# Patient Record
Sex: MALE | Race: WHITE | NOT HISPANIC OR LATINO | Employment: PART TIME | URBAN - NONMETROPOLITAN AREA
[De-identification: names, ages, dates, MRNs, and addresses within clinical notes are randomized per-mention and may not be internally consistent; named-entity substitution may affect disease eponyms.]

---

## 2023-05-15 DIAGNOSIS — I10 ESSENTIAL (PRIMARY) HYPERTENSION: ICD-10-CM

## 2023-05-15 DIAGNOSIS — E11.9 TYPE 2 DIABETES MELLITUS WITHOUT COMPLICATIONS (MULTI): ICD-10-CM

## 2023-05-15 RX ORDER — SPIRONOLACTONE 25 MG/1
TABLET ORAL
Qty: 30 TABLET | Refills: 0 | Status: SHIPPED | OUTPATIENT
Start: 2023-05-15 | End: 2023-06-13

## 2023-05-15 RX ORDER — METFORMIN HYDROCHLORIDE 750 MG/1
TABLET, EXTENDED RELEASE ORAL
Qty: 60 TABLET | Refills: 0 | Status: SHIPPED | OUTPATIENT
Start: 2023-05-15 | End: 2023-06-13

## 2023-05-15 RX ORDER — ATORVASTATIN CALCIUM 80 MG/1
TABLET, FILM COATED ORAL
Qty: 30 TABLET | Refills: 0 | Status: SHIPPED | OUTPATIENT
Start: 2023-05-15 | End: 2023-06-13

## 2023-05-24 DIAGNOSIS — R33.9 RETENTION OF URINE, UNSPECIFIED: ICD-10-CM

## 2023-05-24 RX ORDER — TAMSULOSIN HYDROCHLORIDE 0.4 MG/1
CAPSULE ORAL
Qty: 90 CAPSULE | Refills: 0 | Status: SHIPPED | OUTPATIENT
Start: 2023-05-24 | End: 2023-06-13 | Stop reason: ALTCHOICE

## 2023-05-26 DIAGNOSIS — I10 ESSENTIAL (PRIMARY) HYPERTENSION: ICD-10-CM

## 2023-05-26 RX ORDER — LOSARTAN POTASSIUM 25 MG/1
TABLET ORAL
Qty: 30 TABLET | Refills: 0 | Status: SHIPPED | OUTPATIENT
Start: 2023-05-26 | End: 2023-06-13 | Stop reason: SDUPTHER

## 2023-06-13 ENCOUNTER — OFFICE VISIT (OUTPATIENT)
Dept: PRIMARY CARE | Facility: CLINIC | Age: 78
End: 2023-06-13
Payer: MEDICARE

## 2023-06-13 VITALS
WEIGHT: 203.6 LBS | DIASTOLIC BLOOD PRESSURE: 76 MMHG | HEIGHT: 70 IN | SYSTOLIC BLOOD PRESSURE: 124 MMHG | HEART RATE: 52 BPM | OXYGEN SATURATION: 97 % | BODY MASS INDEX: 29.15 KG/M2

## 2023-06-13 DIAGNOSIS — I10 ESSENTIAL (PRIMARY) HYPERTENSION: ICD-10-CM

## 2023-06-13 DIAGNOSIS — E11.9 TYPE 2 DIABETES MELLITUS WITHOUT COMPLICATIONS (MULTI): ICD-10-CM

## 2023-06-13 DIAGNOSIS — E78.2 MIXED HYPERLIPIDEMIA: ICD-10-CM

## 2023-06-13 DIAGNOSIS — E11.9 CONTROLLED TYPE 2 DIABETES MELLITUS WITHOUT COMPLICATION, WITHOUT LONG-TERM CURRENT USE OF INSULIN (MULTI): Primary | ICD-10-CM

## 2023-06-13 DIAGNOSIS — J30.0 VASOMOTOR RHINITIS: ICD-10-CM

## 2023-06-13 DIAGNOSIS — R93.1 AGATSTON CORONARY ARTERY CALCIUM SCORE GREATER THAN 400: ICD-10-CM

## 2023-06-13 PROBLEM — R33.9 URINARY RETENTION: Status: ACTIVE | Noted: 2023-06-13

## 2023-06-13 PROBLEM — E04.2 NONTOXIC MULTINODULAR GOITER: Status: RESOLVED | Noted: 2023-06-13 | Resolved: 2023-06-13

## 2023-06-13 PROBLEM — M17.10 ARTHRITIS OF KNEE: Status: ACTIVE | Noted: 2023-06-13

## 2023-06-13 PROBLEM — E04.0 GOITER DIFFUSE: Status: RESOLVED | Noted: 2023-06-13 | Resolved: 2023-06-13

## 2023-06-13 PROBLEM — M17.12 PRIMARY OSTEOARTHRITIS OF LEFT KNEE: Status: ACTIVE | Noted: 2023-06-13

## 2023-06-13 PROBLEM — R39.11 URINARY HESITANCY: Status: ACTIVE | Noted: 2023-06-13

## 2023-06-13 PROBLEM — E04.0 GOITER DIFFUSE: Status: ACTIVE | Noted: 2023-06-13

## 2023-06-13 PROBLEM — R33.9 URINARY RETENTION: Status: RESOLVED | Noted: 2023-06-13 | Resolved: 2023-06-13

## 2023-06-13 PROBLEM — Z96.651 STATUS POST RIGHT KNEE REPLACEMENT: Status: RESOLVED | Noted: 2023-06-13 | Resolved: 2023-06-13

## 2023-06-13 LAB
ALBUMIN (MG/L) IN URINE: 444.5 MG/L
ALBUMIN/CREATININE (UG/MG) IN URINE: 468.9 UG/MG CRT (ref 0–30)
ANION GAP IN SER/PLAS: 13 MMOL/L (ref 10–20)
CALCIUM (MG/DL) IN SER/PLAS: 9.2 MG/DL (ref 8.6–10.3)
CARBON DIOXIDE, TOTAL (MMOL/L) IN SER/PLAS: 25 MMOL/L (ref 21–32)
CHLORIDE (MMOL/L) IN SER/PLAS: 100 MMOL/L (ref 98–107)
CHOLESTEROL (MG/DL) IN SER/PLAS: 127 MG/DL (ref 0–199)
CHOLESTEROL IN HDL (MG/DL) IN SER/PLAS: 31.8 MG/DL
CHOLESTEROL/HDL RATIO: 4
CREATININE (MG/DL) IN SER/PLAS: 0.9 MG/DL (ref 0.5–1.3)
CREATININE (MG/DL) IN URINE: 94.8 MG/DL (ref 20–370)
GFR MALE: 87 ML/MIN/1.73M2
GLUCOSE (MG/DL) IN SER/PLAS: 223 MG/DL (ref 74–99)
LDL: 61 MG/DL (ref 0–99)
POC HEMOGLOBIN A1C: 8.3 % (ref 4.2–6.5)
POTASSIUM (MMOL/L) IN SER/PLAS: 4.7 MMOL/L (ref 3.5–5.3)
SODIUM (MMOL/L) IN SER/PLAS: 133 MMOL/L (ref 136–145)
TRIGLYCERIDE (MG/DL) IN SER/PLAS: 171 MG/DL (ref 0–149)
UREA NITROGEN (MG/DL) IN SER/PLAS: 19 MG/DL (ref 6–23)
VLDL: 34 MG/DL (ref 0–40)

## 2023-06-13 PROCEDURE — 3074F SYST BP LT 130 MM HG: CPT | Performed by: FAMILY MEDICINE

## 2023-06-13 PROCEDURE — G0439 PPPS, SUBSEQ VISIT: HCPCS | Performed by: FAMILY MEDICINE

## 2023-06-13 PROCEDURE — 82043 UR ALBUMIN QUANTITATIVE: CPT

## 2023-06-13 PROCEDURE — 1160F RVW MEDS BY RX/DR IN RCRD: CPT | Performed by: FAMILY MEDICINE

## 2023-06-13 PROCEDURE — 1170F FXNL STATUS ASSESSED: CPT | Performed by: FAMILY MEDICINE

## 2023-06-13 PROCEDURE — 83036 HEMOGLOBIN GLYCOSYLATED A1C: CPT | Performed by: FAMILY MEDICINE

## 2023-06-13 PROCEDURE — 3078F DIAST BP <80 MM HG: CPT | Performed by: FAMILY MEDICINE

## 2023-06-13 PROCEDURE — 80048 BASIC METABOLIC PNL TOTAL CA: CPT

## 2023-06-13 PROCEDURE — 99397 PER PM REEVAL EST PAT 65+ YR: CPT | Performed by: FAMILY MEDICINE

## 2023-06-13 PROCEDURE — 1157F ADVNC CARE PLAN IN RCRD: CPT | Performed by: FAMILY MEDICINE

## 2023-06-13 PROCEDURE — 82570 ASSAY OF URINE CREATININE: CPT

## 2023-06-13 PROCEDURE — 1159F MED LIST DOCD IN RCRD: CPT | Performed by: FAMILY MEDICINE

## 2023-06-13 PROCEDURE — 99214 OFFICE O/P EST MOD 30 MIN: CPT | Performed by: FAMILY MEDICINE

## 2023-06-13 PROCEDURE — 1036F TOBACCO NON-USER: CPT | Performed by: FAMILY MEDICINE

## 2023-06-13 PROCEDURE — 80061 LIPID PANEL: CPT

## 2023-06-13 RX ORDER — ATORVASTATIN CALCIUM 80 MG/1
TABLET, FILM COATED ORAL
Qty: 30 TABLET | Refills: 0 | Status: SHIPPED | OUTPATIENT
Start: 2023-06-13 | End: 2023-06-13 | Stop reason: SDUPTHER

## 2023-06-13 RX ORDER — HYDROCHLOROTHIAZIDE 25 MG/1
25 TABLET ORAL DAILY
Qty: 90 TABLET | Refills: 1 | Status: SHIPPED | OUTPATIENT
Start: 2023-06-13 | End: 2023-12-19 | Stop reason: SDUPTHER

## 2023-06-13 RX ORDER — FINASTERIDE 5 MG/1
5 TABLET, FILM COATED ORAL DAILY
COMMUNITY
Start: 2023-01-24 | End: 2023-06-13 | Stop reason: ALTCHOICE

## 2023-06-13 RX ORDER — HYDROCHLOROTHIAZIDE 25 MG/1
TABLET ORAL
COMMUNITY
Start: 2015-08-10 | End: 2023-06-13 | Stop reason: SDUPTHER

## 2023-06-13 RX ORDER — METOPROLOL TARTRATE 100 MG/1
TABLET ORAL
COMMUNITY
Start: 2013-02-26 | End: 2023-06-13 | Stop reason: ALTCHOICE

## 2023-06-13 RX ORDER — VIT C/E/ZN/COPPR/LUTEIN/ZEAXAN 250MG-90MG
CAPSULE ORAL
COMMUNITY
Start: 2020-01-21

## 2023-06-13 RX ORDER — SPIRONOLACTONE 25 MG/1
25 TABLET ORAL DAILY
Qty: 90 TABLET | Refills: 1 | Status: SHIPPED | OUTPATIENT
Start: 2023-06-13 | End: 2023-12-05

## 2023-06-13 RX ORDER — AZELASTINE 1 MG/ML
2 SPRAY, METERED NASAL 2 TIMES DAILY
Qty: 30 ML | Refills: 5 | Status: SHIPPED | OUTPATIENT
Start: 2023-06-13 | End: 2024-06-12

## 2023-06-13 RX ORDER — BLOOD-GLUCOSE METER
EACH MISCELLANEOUS
COMMUNITY
Start: 2022-05-16 | End: 2023-11-02

## 2023-06-13 RX ORDER — LOSARTAN POTASSIUM 25 MG/1
25 TABLET ORAL DAILY
Qty: 90 TABLET | Refills: 1 | Status: SHIPPED | OUTPATIENT
Start: 2023-06-13 | End: 2023-12-19 | Stop reason: SDUPTHER

## 2023-06-13 RX ORDER — ASPIRIN 81 MG/1
1 TABLET ORAL DAILY
COMMUNITY
Start: 2020-03-20

## 2023-06-13 RX ORDER — SPIRONOLACTONE 25 MG/1
TABLET ORAL
Qty: 30 TABLET | Refills: 0 | Status: SHIPPED | OUTPATIENT
Start: 2023-06-13 | End: 2023-06-13 | Stop reason: SDUPTHER

## 2023-06-13 RX ORDER — METFORMIN HYDROCHLORIDE 750 MG/1
TABLET, EXTENDED RELEASE ORAL
Qty: 60 TABLET | Refills: 0 | Status: SHIPPED | OUTPATIENT
Start: 2023-06-13 | End: 2023-06-13 | Stop reason: SDUPTHER

## 2023-06-13 RX ORDER — METFORMIN HYDROCHLORIDE 750 MG/1
1500 TABLET, EXTENDED RELEASE ORAL
Qty: 180 TABLET | Refills: 1 | Status: SHIPPED | OUTPATIENT
Start: 2023-06-13 | End: 2023-12-05

## 2023-06-13 RX ORDER — METOPROLOL SUCCINATE 100 MG/1
100 TABLET, EXTENDED RELEASE ORAL DAILY
Qty: 90 TABLET | Refills: 1 | Status: SHIPPED | OUTPATIENT
Start: 2023-06-13 | End: 2023-12-05

## 2023-06-13 RX ORDER — ATORVASTATIN CALCIUM 80 MG/1
80 TABLET, FILM COATED ORAL EVERY EVENING
Qty: 90 TABLET | Refills: 1 | Status: SHIPPED | OUTPATIENT
Start: 2023-06-13 | End: 2023-12-05

## 2023-06-13 RX ORDER — AZELASTINE 1 MG/ML
SPRAY, METERED NASAL
COMMUNITY
Start: 2013-02-26 | End: 2023-06-13 | Stop reason: SDUPTHER

## 2023-06-13 ASSESSMENT — ENCOUNTER SYMPTOMS
APPETITE CHANGE: 0
PALPITATIONS: 0
COUGH: 0
SINUS PRESSURE: 0
CONSTIPATION: 0
ABDOMINAL DISTENTION: 0
CHEST TIGHTNESS: 0
SHORTNESS OF BREATH: 0
ACTIVITY CHANGE: 0
FATIGUE: 0
DIARRHEA: 0
FEVER: 0

## 2023-06-13 ASSESSMENT — ACTIVITIES OF DAILY LIVING (ADL)
MANAGING_FINANCES: INDEPENDENT
BATHING: INDEPENDENT
GROCERY_SHOPPING: INDEPENDENT
TAKING_MEDICATION: INDEPENDENT
DOING_HOUSEWORK: INDEPENDENT
DRESSING: INDEPENDENT

## 2023-06-13 ASSESSMENT — PATIENT HEALTH QUESTIONNAIRE - PHQ9
SUM OF ALL RESPONSES TO PHQ9 QUESTIONS 1 AND 2: 0
1. LITTLE INTEREST OR PLEASURE IN DOING THINGS: NOT AT ALL
2. FEELING DOWN, DEPRESSED OR HOPELESS: NOT AT ALL

## 2023-06-13 NOTE — PROGRESS NOTES
"Subjective   Reason for Visit: Gary Pineda is an 78 y.o. male here for a Medicare Wellness visit.        Reviewed all medications by prescribing practitioner or clinical pharmacist (such as prescriptions, OTCs, herbal therapies and supplements) and documented in the medical record.    HPI  Patient also in for 6-month checkup he is actually a month late  Overall doing well  No concerns regarding current medications  Does continue to work  Had green light laser Tx prostate  DM II eye exam feb 2023  No numbness feet  Some urgency incontinence since laser turp    Patient Care Team:  Hermelindo Perez DO as PCP - General  Hermelindo Perez DO as PCP - [employer] PCP     Review of Systems   Constitutional:  Negative for activity change, appetite change, fatigue and fever.   HENT:  Positive for hearing loss. Negative for congestion, dental problem and sinus pressure.    Eyes:  Negative for visual disturbance.   Respiratory:  Negative for cough, chest tightness and shortness of breath.    Cardiovascular:  Negative for chest pain, palpitations and leg swelling.   Gastrointestinal:  Negative for abdominal distention, constipation and diarrhea.       Objective   Vitals:  /76 (BP Location: Left arm, Patient Position: Sitting, BP Cuff Size: Adult)   Pulse 52   Ht 1.772 m (5' 9.75\")   Wt 92.4 kg (203 lb 9.6 oz)   SpO2 97%   BMI 29.42 kg/m²       Physical Exam  General: alert, no apparent distress, good hygiene   HEAD:  Normocephalic, atraumatic    EARS:  EAC patent, TMs normal,   EYES:  sclera white, GUILLERMO, conjunctiva noninjected  NOSE: Nasal passages patent   MOUTH: Pharynx clear, tongue uvula midline, grade 3 airway  Neck:  supple, no masses, thyroid  enlarged non nodular, no cervical adenopathy,  Lungs:  no wheezing, no rales , no rhonchi, normal respiratory pattern, breath sounds not diminished  Heart:  regular rate and  rhythm, no murmur, no ectopy, no S3 or S4, no carotid bruits  Abdomen:  soft NT,BS " + ,  no organomegaly, no masses, no bruits  Extremities:  no edema, no cyanosis, no clubbing,  2+ posterior tibialis pulse    Psych:  speech fluent, normal affect, normal thought process  Skin:  no rashes, no concerning skin lesions, normal texture      Assessment/Plan   Problem List Items Addressed This Visit       Benign essential hypertension    Relevant Orders    Basic Metabolic Panel    Controlled type 2 diabetes mellitus without complication, without long-term current use of insulin (CMS/[facility]) - Primary    Relevant Orders    Basic Metabolic Panel    POCT glycosylated hemoglobin (Hb A1C) manually resulted    Albumin , Urine Random    Mixed hyperlipidemia    Relevant Orders    Lipid Panel   Patient encouraged to have Shingrix vaccine along with Prevnar 20.  Medications renewed.  Follow diet closely her hemoglobin A1c elevated.  Blood pressure controlled with current medication.  Lab work performed.  Patient did have diabetic eye exam

## 2023-06-13 NOTE — PROGRESS NOTES
"Subjective   Patient ID: Gary Pineda is a 78 y.o. male who presents for Annual Exam.    HPI     Review of Systems    Objective   /76 (BP Location: Left arm, Patient Position: Sitting, BP Cuff Size: Adult)   Pulse 52   Ht 1.772 m (5' 9.75\")   Wt 92.4 kg (203 lb 9.6 oz)   SpO2 97%   BMI 29.42 kg/m²     Physical Exam    Assessment/Plan          "

## 2023-10-06 PROBLEM — D48.5 NEOPLASM OF UNCERTAIN BEHAVIOR OF SKIN: Status: ACTIVE | Noted: 2022-09-28

## 2023-10-06 PROBLEM — L82.0 INFLAMED SEBORRHEIC KERATOSIS: Status: ACTIVE | Noted: 2022-09-28

## 2023-10-06 PROBLEM — D22.4 MELANOCYTIC NEVI OF SCALP AND NECK: Status: ACTIVE | Noted: 2022-09-28

## 2023-10-06 PROBLEM — D22.62 MELANOCYTIC NEVI OF LEFT UPPER LIMB, INCLUDING SHOULDER: Status: ACTIVE | Noted: 2022-09-28

## 2023-10-06 PROBLEM — Z96.651 PRESENCE OF RIGHT ARTIFICIAL KNEE JOINT: Status: ACTIVE | Noted: 2020-09-04

## 2023-10-06 PROBLEM — L90.5 SCAR CONDITION AND FIBROSIS OF SKIN: Status: ACTIVE | Noted: 2022-09-28

## 2023-10-06 PROBLEM — L81.4 OTHER MELANIN HYPERPIGMENTATION: Status: ACTIVE | Noted: 2022-09-28

## 2023-10-06 PROBLEM — Z85.820 PERSONAL HISTORY OF MALIGNANT MELANOMA OF SKIN: Status: ACTIVE | Noted: 2022-09-28

## 2023-10-06 PROBLEM — L23.9 ALLERGIC CONTACT DERMATITIS, UNSPECIFIED CAUSE: Status: ACTIVE | Noted: 2022-09-28

## 2023-10-06 PROBLEM — Z85.828 PERSONAL HISTORY OF OTHER MALIGNANT NEOPLASM OF SKIN: Status: ACTIVE | Noted: 2022-09-28

## 2023-10-06 PROBLEM — N52.9 MALE ERECTILE DISORDER: Status: ACTIVE | Noted: 2023-10-06

## 2023-10-06 PROBLEM — L82.1 OTHER SEBORRHEIC KERATOSIS: Status: ACTIVE | Noted: 2022-09-28

## 2023-10-06 PROBLEM — N40.1 BENIGN PROSTATIC HYPERPLASIA WITH LOWER URINARY TRACT SYMPTOMS: Status: ACTIVE | Noted: 2020-09-02

## 2023-10-06 PROBLEM — C44.629 SQUAMOUS CELL CARCINOMA OF SKIN OF LEFT UPPER LIMB, INCLUDING SHOULDER: Status: ACTIVE | Noted: 2022-09-28

## 2023-10-06 PROBLEM — L91.8 OTHER HYPERTROPHIC DISORDERS OF THE SKIN: Status: ACTIVE | Noted: 2022-09-28

## 2023-10-06 PROBLEM — R21 RASH AND OTHER NONSPECIFIC SKIN ERUPTION: Status: ACTIVE | Noted: 2022-09-28

## 2023-10-06 PROBLEM — D22.30 MELANOCYTIC NEVI OF UNSPECIFIED PART OF FACE: Status: ACTIVE | Noted: 2022-09-28

## 2023-10-06 PROBLEM — C43.72 MALIGNANT MELANOMA OF LEFT LOWER LIMB, INCLUDING HIP (MULTI): Status: ACTIVE | Noted: 2022-09-28

## 2023-10-06 PROBLEM — D23.9 OTHER BENIGN NEOPLASM OF SKIN, UNSPECIFIED: Status: ACTIVE | Noted: 2022-09-28

## 2023-10-06 PROBLEM — L57.0 ACTINIC KERATOSIS: Status: ACTIVE | Noted: 2022-09-28

## 2023-10-06 PROBLEM — B35.6 TINEA CRURIS: Status: ACTIVE | Noted: 2022-09-28

## 2023-10-06 PROBLEM — L30.9 DERMATITIS: Status: ACTIVE | Noted: 2022-09-28

## 2023-10-06 PROBLEM — D22.70 MELANOCYTIC NEVI OF UNSPECIFIED LOWER LIMB, INCLUDING HIP: Status: ACTIVE | Noted: 2022-09-28

## 2023-10-06 PROBLEM — Z79.84 LONG TERM (CURRENT) USE OF ORAL HYPOGLYCEMIC DRUGS: Status: ACTIVE | Noted: 2020-09-04

## 2023-10-06 PROBLEM — D18.01 HEMANGIOMA OF SKIN AND SUBCUTANEOUS TISSUE: Status: ACTIVE | Noted: 2022-09-28

## 2023-10-06 PROBLEM — L30.9 ECZEMA: Status: ACTIVE | Noted: 2023-10-06

## 2023-10-06 PROBLEM — D22.5 MELANOCYTIC NEVI OF TRUNK: Status: ACTIVE | Noted: 2022-09-28

## 2023-10-06 RX ORDER — LOSARTAN POTASSIUM 25 MG/1
25 TABLET ORAL DAILY
COMMUNITY
Start: 2020-09-04 | End: 2023-12-19 | Stop reason: SDUPTHER

## 2023-10-06 RX ORDER — OXYCODONE AND ACETAMINOPHEN 5; 325 MG/1; MG/1
TABLET ORAL EVERY 4 HOURS
COMMUNITY
Start: 2020-09-04 | End: 2023-12-19 | Stop reason: WASHOUT

## 2023-10-06 RX ORDER — DOCUSATE SODIUM 100 MG/1
100 CAPSULE, LIQUID FILLED ORAL 2 TIMES DAILY
COMMUNITY
Start: 2020-09-04

## 2023-10-06 RX ORDER — TRIAMCINOLONE ACETONIDE 1 MG/G
CREAM TOPICAL
COMMUNITY
Start: 2015-08-10

## 2023-10-06 RX ORDER — TAMSULOSIN HYDROCHLORIDE 0.4 MG/1
0.4 CAPSULE ORAL NIGHTLY
COMMUNITY
Start: 2016-10-12 | End: 2023-12-19 | Stop reason: SDUPTHER

## 2023-10-10 ENCOUNTER — OFFICE VISIT (OUTPATIENT)
Dept: DERMATOLOGY | Facility: CLINIC | Age: 78
End: 2023-10-10
Payer: MEDICARE

## 2023-10-10 DIAGNOSIS — C44.511 BASAL CELL CARCINOMA (BCC) OF SKIN OF RIGHT BREAST: Primary | ICD-10-CM

## 2023-10-10 PROBLEM — D22.30 MELANOCYTIC NEVI OF UNSPECIFIED PART OF FACE: Status: RESOLVED | Noted: 2022-09-28 | Resolved: 2023-10-10

## 2023-10-10 PROBLEM — D22.5 MELANOCYTIC NEVI OF TRUNK: Status: RESOLVED | Noted: 2022-09-28 | Resolved: 2023-10-10

## 2023-10-10 PROBLEM — D48.5 NEOPLASM OF UNCERTAIN BEHAVIOR OF SKIN: Status: RESOLVED | Noted: 2022-09-28 | Resolved: 2023-10-10

## 2023-10-10 PROBLEM — D22.70 MELANOCYTIC NEVI OF UNSPECIFIED LOWER LIMB, INCLUDING HIP: Status: RESOLVED | Noted: 2022-09-28 | Resolved: 2023-10-10

## 2023-10-10 PROBLEM — L90.5 SCAR CONDITION AND FIBROSIS OF SKIN: Status: RESOLVED | Noted: 2022-09-28 | Resolved: 2023-10-10

## 2023-10-10 PROBLEM — D23.9 OTHER BENIGN NEOPLASM OF SKIN, UNSPECIFIED: Status: RESOLVED | Noted: 2022-09-28 | Resolved: 2023-10-10

## 2023-10-10 PROBLEM — D22.62 MELANOCYTIC NEVI OF LEFT UPPER LIMB, INCLUDING SHOULDER: Status: RESOLVED | Noted: 2022-09-28 | Resolved: 2023-10-10

## 2023-10-10 PROBLEM — L91.8 OTHER HYPERTROPHIC DISORDERS OF THE SKIN: Status: RESOLVED | Noted: 2022-09-28 | Resolved: 2023-10-10

## 2023-10-10 PROBLEM — L82.1 OTHER SEBORRHEIC KERATOSIS: Status: RESOLVED | Noted: 2022-09-28 | Resolved: 2023-10-10

## 2023-10-10 PROBLEM — L81.4 OTHER MELANIN HYPERPIGMENTATION: Status: RESOLVED | Noted: 2022-09-28 | Resolved: 2023-10-10

## 2023-10-10 PROBLEM — D18.01 HEMANGIOMA OF SKIN AND SUBCUTANEOUS TISSUE: Status: RESOLVED | Noted: 2022-09-28 | Resolved: 2023-10-10

## 2023-10-10 PROBLEM — L82.0 INFLAMED SEBORRHEIC KERATOSIS: Status: RESOLVED | Noted: 2022-09-28 | Resolved: 2023-10-10

## 2023-10-10 PROBLEM — L30.9 DERMATITIS: Status: RESOLVED | Noted: 2022-09-28 | Resolved: 2023-10-10

## 2023-10-10 PROBLEM — D22.4 MELANOCYTIC NEVI OF SCALP AND NECK: Status: RESOLVED | Noted: 2022-09-28 | Resolved: 2023-10-10

## 2023-10-10 PROBLEM — R21 RASH AND OTHER NONSPECIFIC SKIN ERUPTION: Status: RESOLVED | Noted: 2022-09-28 | Resolved: 2023-10-10

## 2023-10-10 PROCEDURE — 1159F MED LIST DOCD IN RCRD: CPT | Performed by: DERMATOLOGY

## 2023-10-10 PROCEDURE — 17262 DSTRJ MAL LES T/A/L 1.1-2.0: CPT | Performed by: DERMATOLOGY

## 2023-10-10 PROCEDURE — 1160F RVW MEDS BY RX/DR IN RCRD: CPT | Performed by: DERMATOLOGY

## 2023-10-10 PROCEDURE — 1036F TOBACCO NON-USER: CPT | Performed by: DERMATOLOGY

## 2023-10-10 NOTE — PROGRESS NOTES
Operative Note    Date of Surgery:  10/10/2023  Surgeon:  Calli Bauer MD            Assessment/Plan   Basal cell carcinoma (BCC) of skin of right breast  Right Upper Chest    Destr of lesion  Complexity: simple    Destruction method: electrodesiccation and curettage    Timeout:  patient name, date of birth, surgical site, and procedure verified  Procedure prep:  Patient was prepped and draped  Anesthesia: the lesion was anesthetized in a standard fashion    Anesthetic:  1% lidocaine w/ epinephrine 1-100,000 local infiltration  Curettage performed in three different directions: Yes    Electrodesiccation performed over the curetted area: Yes    Curettage cycles:  3  Lesion length (cm):  0.9  Lesion width (cm):  0.5  Margin per side (cm):  0.3  Final wound size (cm):  1.5  Hemostasis achieved with:  electrodesiccation  Outcome: patient tolerated procedure well with no complications    Post-procedure details: sterile dressing applied and wound care instructions given    Dressing type: petrolatum and bandage      Staff Communication: Dermatology Local Anesthesia: 1 % Lidocaine / Epinephrine - Amount: 3 cc        FSE Feb as scheduled

## 2023-10-10 NOTE — PATIENT INSTRUCTIONS
Department of Dermatology    Daily Care of your Biopsy Site/Excision/ ED&C    Leave the initial bandage on for 24 hours.  Keep the area dry.  After 24 hours, remove bandage, clean the area gently with mild soap and water in the shower.  Try to remove any crust that may have formed.  Pat dry.    Apply a thin layer of Vaseline ointment and cover with a Band-Aid.   Continue daily until stitches are removed or until the area has healed.     Discomfort: If you experience discomfort, you may take Tylenol (Acetaminophen) or Extra Strength Tylenol. If you don't have any allergies and are able to take Tylenol, take one to two tablets as directed on the bottle.   Bleeding: If bleeding occurs, do not remove the bandage.  Apply continuous firm pressure with gauze for 15 to 20 minutes with no peeking. If the bleeding persists, repeat the pressure for an additional 20 minutes.  If bleeding continues, you should notify us immediately.  If the office is closed, call [phone] and ask to page the dermatology resident doctor on call.  The resident on call will advise you with instructions or determine if you need to go to your nearest emergency room.  PLEASE do not go the emergency room without attempting to contact us first. Please notify us of any bleeding, as you will most likely need to have a bandage change.    Infection: Some soreness and redness is expected. It is normal to develop a red ring around the area and yellow leakage. Do not be alarmed. If the area becomes very sore, red, and/or hot to the touch, or you have a fever, please notify us.  It is possible the area may be infected.     If you are expecting pathology results: Please note that your results may be available as soon as they are released by our Dermatopathologists. However, please allow us between 7 to 14 days to review and interpret the results. We will contact you to discuss them. Please  call our office on day 14 if you have not heard from us by then. Thank you for your patience and understanding.    *If you have any questions or difficulties, please contact us.   Weekdays call [phone]  Evenings and Weekends call [phone] and ask to page the dermatology resident doctor on call.

## 2023-10-25 ENCOUNTER — TELEPHONE (OUTPATIENT)
Dept: PRIMARY CARE | Facility: CLINIC | Age: 78
End: 2023-10-25
Payer: MEDICARE

## 2023-10-25 NOTE — TELEPHONE ENCOUNTER
Feeling bad for the last couple days, he tested positive for Covid this am, is there something he should be taking?

## 2023-10-25 NOTE — TELEPHONE ENCOUNTER
Per Dr Perez does he have a fever the only Tx is Paxlovid,  per pt he does not have a fever.  He is feeling better, if things change he will let us know.

## 2023-10-30 ENCOUNTER — TELEPHONE (OUTPATIENT)
Dept: PRIMARY CARE | Facility: CLINIC | Age: 78
End: 2023-10-30
Payer: MEDICARE

## 2023-10-30 NOTE — TELEPHONE ENCOUNTER
Pt states he is 6-7 into his covid dx and feels good. Quarantined for 6 days. Wants to know if there is anything he needs to do, ie negative testing?

## 2023-11-02 DIAGNOSIS — E11.9 TYPE 2 DIABETES MELLITUS WITHOUT COMPLICATIONS (MULTI): ICD-10-CM

## 2023-11-02 RX ORDER — BLOOD-GLUCOSE METER
EACH MISCELLANEOUS
Qty: 100 STRIP | Refills: 3 | Status: SHIPPED | OUTPATIENT
Start: 2023-11-02

## 2023-11-02 NOTE — TELEPHONE ENCOUNTER
Inform patient as long as he is no longer coughing or fever and has been greater than 6 days okay to return

## 2023-12-04 DIAGNOSIS — E11.9 CONTROLLED TYPE 2 DIABETES MELLITUS WITHOUT COMPLICATION, WITHOUT LONG-TERM CURRENT USE OF INSULIN (MULTI): ICD-10-CM

## 2023-12-04 DIAGNOSIS — E11.9 TYPE 2 DIABETES MELLITUS WITHOUT COMPLICATIONS (MULTI): ICD-10-CM

## 2023-12-04 DIAGNOSIS — I10 ESSENTIAL (PRIMARY) HYPERTENSION: ICD-10-CM

## 2023-12-05 RX ORDER — ATORVASTATIN CALCIUM 80 MG/1
80 TABLET, FILM COATED ORAL EVERY EVENING
Qty: 30 TABLET | Refills: 0 | Status: SHIPPED | OUTPATIENT
Start: 2023-12-05 | End: 2023-12-19 | Stop reason: SDUPTHER

## 2023-12-05 RX ORDER — SPIRONOLACTONE 25 MG/1
25 TABLET ORAL DAILY
Qty: 30 TABLET | Refills: 0 | Status: SHIPPED | OUTPATIENT
Start: 2023-12-05 | End: 2023-12-19 | Stop reason: SDUPTHER

## 2023-12-05 RX ORDER — METOPROLOL SUCCINATE 100 MG/1
100 TABLET, EXTENDED RELEASE ORAL DAILY
Qty: 30 TABLET | Refills: 0 | Status: SHIPPED | OUTPATIENT
Start: 2023-12-05 | End: 2023-12-19 | Stop reason: SDUPTHER

## 2023-12-05 RX ORDER — METFORMIN HYDROCHLORIDE 750 MG/1
1500 TABLET, EXTENDED RELEASE ORAL
Qty: 60 TABLET | Refills: 0 | Status: SHIPPED | OUTPATIENT
Start: 2023-12-05 | End: 2024-06-02

## 2023-12-19 ENCOUNTER — OFFICE VISIT (OUTPATIENT)
Dept: PRIMARY CARE | Facility: CLINIC | Age: 78
End: 2023-12-19
Payer: MEDICARE

## 2023-12-19 VITALS
HEART RATE: 48 BPM | DIASTOLIC BLOOD PRESSURE: 62 MMHG | BODY MASS INDEX: 29.13 KG/M2 | OXYGEN SATURATION: 97 % | WEIGHT: 201.6 LBS | SYSTOLIC BLOOD PRESSURE: 120 MMHG

## 2023-12-19 DIAGNOSIS — R35.1 NOCTURIA: ICD-10-CM

## 2023-12-19 DIAGNOSIS — C43.72 MALIGNANT MELANOMA OF LEFT LOWER LIMB, INCLUDING HIP (MULTI): ICD-10-CM

## 2023-12-19 DIAGNOSIS — R93.1 AGATSTON CORONARY ARTERY CALCIUM SCORE GREATER THAN 400: ICD-10-CM

## 2023-12-19 DIAGNOSIS — E11.9 CONTROLLED TYPE 2 DIABETES MELLITUS WITHOUT COMPLICATION, WITHOUT LONG-TERM CURRENT USE OF INSULIN (MULTI): Primary | ICD-10-CM

## 2023-12-19 DIAGNOSIS — I10 ESSENTIAL (PRIMARY) HYPERTENSION: ICD-10-CM

## 2023-12-19 LAB
ALBUMIN SERPL BCP-MCNC: 4 G/DL (ref 3.4–5)
ANION GAP SERPL CALC-SCNC: 14 MMOL/L (ref 10–20)
BUN SERPL-MCNC: 18 MG/DL (ref 6–23)
CALCIUM SERPL-MCNC: 9.7 MG/DL (ref 8.6–10.3)
CHLORIDE SERPL-SCNC: 99 MMOL/L (ref 98–107)
CO2 SERPL-SCNC: 27 MMOL/L (ref 21–32)
CREAT SERPL-MCNC: 0.92 MG/DL (ref 0.5–1.3)
GFR SERPL CREATININE-BSD FRML MDRD: 85 ML/MIN/1.73M*2
GLUCOSE SERPL-MCNC: 181 MG/DL (ref 74–99)
PHOSPHATE SERPL-MCNC: 4.3 MG/DL (ref 2.5–4.9)
POC HEMOGLOBIN A1C: 8 % (ref 4.2–6.5)
POTASSIUM SERPL-SCNC: 5.3 MMOL/L (ref 3.5–5.3)
SODIUM SERPL-SCNC: 135 MMOL/L (ref 136–145)

## 2023-12-19 PROCEDURE — 80069 RENAL FUNCTION PANEL: CPT

## 2023-12-19 PROCEDURE — 1036F TOBACCO NON-USER: CPT | Performed by: FAMILY MEDICINE

## 2023-12-19 PROCEDURE — 3074F SYST BP LT 130 MM HG: CPT | Performed by: FAMILY MEDICINE

## 2023-12-19 PROCEDURE — 3078F DIAST BP <80 MM HG: CPT | Performed by: FAMILY MEDICINE

## 2023-12-19 PROCEDURE — 90662 IIV NO PRSV INCREASED AG IM: CPT | Performed by: FAMILY MEDICINE

## 2023-12-19 PROCEDURE — 36415 COLL VENOUS BLD VENIPUNCTURE: CPT

## 2023-12-19 PROCEDURE — 82570 ASSAY OF URINE CREATININE: CPT

## 2023-12-19 PROCEDURE — 82043 UR ALBUMIN QUANTITATIVE: CPT

## 2023-12-19 PROCEDURE — G0008 ADMIN INFLUENZA VIRUS VAC: HCPCS | Performed by: FAMILY MEDICINE

## 2023-12-19 PROCEDURE — 1160F RVW MEDS BY RX/DR IN RCRD: CPT | Performed by: FAMILY MEDICINE

## 2023-12-19 PROCEDURE — 83036 HEMOGLOBIN GLYCOSYLATED A1C: CPT | Performed by: FAMILY MEDICINE

## 2023-12-19 PROCEDURE — 1159F MED LIST DOCD IN RCRD: CPT | Performed by: FAMILY MEDICINE

## 2023-12-19 PROCEDURE — 99214 OFFICE O/P EST MOD 30 MIN: CPT | Performed by: FAMILY MEDICINE

## 2023-12-19 RX ORDER — SPIRONOLACTONE 25 MG/1
25 TABLET ORAL DAILY
Qty: 90 TABLET | Refills: 1 | Status: SHIPPED | OUTPATIENT
Start: 2023-12-19 | End: 2024-06-16

## 2023-12-19 RX ORDER — ATORVASTATIN CALCIUM 80 MG/1
80 TABLET, FILM COATED ORAL EVERY EVENING
Qty: 90 TABLET | Refills: 1 | Status: SHIPPED | OUTPATIENT
Start: 2023-12-19 | End: 2024-06-16

## 2023-12-19 RX ORDER — METOPROLOL SUCCINATE 100 MG/1
100 TABLET, EXTENDED RELEASE ORAL DAILY
Qty: 90 TABLET | Refills: 1 | Status: SHIPPED | OUTPATIENT
Start: 2023-12-19 | End: 2024-06-16

## 2023-12-19 RX ORDER — HYDROCHLOROTHIAZIDE 25 MG/1
25 TABLET ORAL DAILY
Qty: 90 TABLET | Refills: 1 | Status: SHIPPED | OUTPATIENT
Start: 2023-12-19 | End: 2024-06-16

## 2023-12-19 RX ORDER — LOSARTAN POTASSIUM 25 MG/1
25 TABLET ORAL DAILY
Qty: 90 TABLET | Refills: 1 | Status: SHIPPED | OUTPATIENT
Start: 2023-12-19 | End: 2024-04-04

## 2023-12-19 RX ORDER — TAMSULOSIN HYDROCHLORIDE 0.4 MG/1
0.4 CAPSULE ORAL NIGHTLY
Qty: 90 CAPSULE | Refills: 1 | Status: SHIPPED | OUTPATIENT
Start: 2023-12-19 | End: 2023-12-19 | Stop reason: ALTCHOICE

## 2023-12-19 ASSESSMENT — ENCOUNTER SYMPTOMS
SHORTNESS OF BREATH: 0
APPETITE CHANGE: 0
SINUS PRESSURE: 0
CHEST TIGHTNESS: 0
WHEEZING: 0
PALPITATIONS: 0
FATIGUE: 0
ACTIVITY CHANGE: 0
FEVER: 0

## 2023-12-19 NOTE — PROGRESS NOTES
Subjective   Patient ID: Juan Pineda is a 78 y.o. male who presents for Follow-up and Med Refill.    HPI   DM II , HTN  Feels good  Am -170 post breakfast  Had eye exam  Feet good no numbness tingling  Overall feels very well  Continues to work  Denies dyspnea chest pain pressure palpitations  No visual disturbance      Review of Systems   Constitutional:  Negative for activity change, appetite change, fatigue and fever.   HENT:  Negative for congestion, postnasal drip and sinus pressure.    Eyes:  Negative for visual disturbance.   Respiratory:  Negative for chest tightness, shortness of breath and wheezing.    Cardiovascular:  Negative for chest pain, palpitations and leg swelling.   Has followed up with urology hematuria just stopped a month ago.  No longer on tamsulosin does have nocturia x 1     Objective   /62   Pulse (!) 48   Wt 91.4 kg (201 lb 9.6 oz)   SpO2 97%   BMI 29.13 kg/m²     Physical Exam  General: alert, no apparent distress, good hygiene   HEAD:  Normocephalic, atraumatic    EARS:  EAC patent, TMs normal,   EYES:  sclera white, GUILLERMO, conjunctiva noninjected  NOSE: Nasal passages patent   MOUTH: Pharynx clear, tongue uvula midline, grade 3 airway  Neck:  supple, no masses, thyroid non enlarged non nodular, no cervical adenopathy,  Lungs:  no wheezing, no rales , no rhonchi, normal respiratory pattern, breath sounds not diminished  Heart:  regular rate and  rhythm, no murmur, no ectopy, no S3 or S4, no carotid bruits  Abdomen:  soft NT,BS + ,  no organomegaly, no masses, no bruits  Extremities:  trace  edema, no cyanosis, no clubbing,  2+ posterior tibialis pulse    Psych:  speech fluent, normal affect, normal thought process  Skin:  no rashes, no concerning skin lesions, normal texture  Neuro:  normal gait      Assessment/Plan   Problem List Items Addressed This Visit             ICD-10-CM    Agatston coronary artery calcium score greater than 400 R93.1    Relevant Medications     metoprolol succinate XL (Toprol-XL) 100 mg 24 hr tablet    Controlled type 2 diabetes mellitus without complication, without long-term current use of insulin (CMS/[facility]) - Primary E11.9    Relevant Medications    SITagliptin phosphate (Januvia) 100 mg tablet    Other Relevant Orders    POCT glycosylated hemoglobin (Hb A1C) manually resulted (Completed)    Albumin , Urine Random    Renal Function Panel    Essential (primary) hypertension I10    Relevant Medications    atorvastatin (Lipitor) 80 mg tablet    hydroCHLOROthiazide (HYDRODiuril) 25 mg tablet    losartan (Cozaar) 25 mg tablet    metoprolol succinate XL (Toprol-XL) 100 mg 24 hr tablet    spironolactone (Aldactone) 25 mg tablet    Other Relevant Orders    Renal Function Panel    Malignant melanoma of left lower limb, including hip (CMS/[facility]) C43.72     Other Visit Diagnoses         Codes    Nocturia     R35.1        Encouraged to monitor diet closer exercise  Continue current medications.  Follow-up in 6 months.  Medications renewed.  Recheck urine albumin was significantly evaded last check also renal panel.  Hemoglobin A1C was at 8

## 2023-12-20 LAB
CREAT UR-MCNC: 83.8 MG/DL (ref 20–370)
MICROALBUMIN UR-MCNC: 128.2 MG/L
MICROALBUMIN/CREAT UR: 153 UG/MG CREAT

## 2023-12-26 ENCOUNTER — APPOINTMENT (OUTPATIENT)
Dept: PRIMARY CARE | Facility: CLINIC | Age: 78
End: 2023-12-26
Payer: MEDICARE

## 2024-01-16 ENCOUNTER — LAB (OUTPATIENT)
Dept: LAB | Facility: LAB | Age: 79
End: 2024-01-16
Payer: MEDICARE

## 2024-01-16 DIAGNOSIS — R31.0 GROSS HEMATURIA: Primary | ICD-10-CM

## 2024-01-16 LAB
ANION GAP SERPL CALC-SCNC: 15 MMOL/L (ref 10–20)
BUN SERPL-MCNC: 18 MG/DL (ref 6–23)
CALCIUM SERPL-MCNC: 9.5 MG/DL (ref 8.6–10.3)
CHLORIDE SERPL-SCNC: 99 MMOL/L (ref 98–107)
CO2 SERPL-SCNC: 25 MMOL/L (ref 21–32)
CREAT SERPL-MCNC: 0.82 MG/DL (ref 0.5–1.3)
EGFRCR SERPLBLD CKD-EPI 2021: 89 ML/MIN/1.73M*2
GLUCOSE SERPL-MCNC: 178 MG/DL (ref 74–99)
POTASSIUM SERPL-SCNC: 4.9 MMOL/L (ref 3.5–5.3)
SODIUM SERPL-SCNC: 134 MMOL/L (ref 136–145)

## 2024-01-16 PROCEDURE — 80048 BASIC METABOLIC PNL TOTAL CA: CPT

## 2024-01-16 PROCEDURE — 36415 COLL VENOUS BLD VENIPUNCTURE: CPT

## 2024-01-25 ENCOUNTER — HOSPITAL ENCOUNTER (OUTPATIENT)
Dept: RADIOLOGY | Facility: HOSPITAL | Age: 79
Discharge: HOME | End: 2024-01-25
Payer: MEDICARE

## 2024-01-25 DIAGNOSIS — R31.0 GROSS HEMATURIA: ICD-10-CM

## 2024-01-25 PROCEDURE — 2550000001 HC RX 255 CONTRASTS: Performed by: SURGERY

## 2024-01-25 PROCEDURE — 76377 3D RENDER W/INTRP POSTPROCES: CPT | Performed by: RADIOLOGY

## 2024-01-25 PROCEDURE — 74178 CT ABD&PLV WO CNTR FLWD CNTR: CPT | Performed by: RADIOLOGY

## 2024-01-25 PROCEDURE — 76377 3D RENDER W/INTRP POSTPROCES: CPT

## 2024-01-25 RX ADMIN — IOHEXOL 90 ML: 350 INJECTION, SOLUTION INTRAVENOUS at 10:50

## 2024-02-13 ENCOUNTER — OFFICE VISIT (OUTPATIENT)
Dept: DERMATOLOGY | Facility: CLINIC | Age: 79
End: 2024-02-13
Payer: MEDICARE

## 2024-02-13 DIAGNOSIS — L57.0 ACTINIC KERATOSIS: Primary | ICD-10-CM

## 2024-02-13 DIAGNOSIS — Z85.828 PERSONAL HISTORY OF SKIN CANCER: ICD-10-CM

## 2024-02-13 DIAGNOSIS — D22.9 MULTIPLE BENIGN NEVI: ICD-10-CM

## 2024-02-13 DIAGNOSIS — L81.4 LENTIGO: ICD-10-CM

## 2024-02-13 DIAGNOSIS — Z12.83 SCREENING EXAM FOR SKIN CANCER: ICD-10-CM

## 2024-02-13 DIAGNOSIS — Z85.820 PERSONAL HISTORY OF MALIGNANT MELANOMA OF SKIN: ICD-10-CM

## 2024-02-13 DIAGNOSIS — L82.1 SEBORRHEIC KERATOSIS: ICD-10-CM

## 2024-02-13 DIAGNOSIS — L82.0 INFLAMED SEBORRHEIC KERATOSIS: ICD-10-CM

## 2024-02-13 PROCEDURE — 17110 DESTRUCTION B9 LES UP TO 14: CPT | Performed by: DERMATOLOGY

## 2024-02-13 PROCEDURE — 1159F MED LIST DOCD IN RCRD: CPT | Performed by: DERMATOLOGY

## 2024-02-13 PROCEDURE — 17003 DESTRUCT PREMALG LES 2-14: CPT | Performed by: DERMATOLOGY

## 2024-02-13 PROCEDURE — 1160F RVW MEDS BY RX/DR IN RCRD: CPT | Performed by: DERMATOLOGY

## 2024-02-13 PROCEDURE — 17000 DESTRUCT PREMALG LESION: CPT | Performed by: DERMATOLOGY

## 2024-02-13 PROCEDURE — 1036F TOBACCO NON-USER: CPT | Performed by: DERMATOLOGY

## 2024-02-13 PROCEDURE — 99213 OFFICE O/P EST LOW 20 MIN: CPT | Performed by: DERMATOLOGY

## 2024-02-13 PROCEDURE — 1157F ADVNC CARE PLAN IN RCRD: CPT | Performed by: DERMATOLOGY

## 2024-02-13 NOTE — PROGRESS NOTES
Subjective     Juan Pineda is a 79 y.o. male who presents for the following: Skin Check (Pt concerned about spots on Left Arm, Right Scalp, and Nose.).     Review of Systems:  No other skin or systemic complaints other than what is documented elsewhere in the note.    The following portions of the chart were reviewed this encounter and updated as appropriate:  Tobacco  Allergies  Meds  Problems  Med Hx  Surg Hx       Skin Cancer History  No skin cancer on file.    Specialty Problems          Dermatology Problems    Actinic keratosis    Allergic contact dermatitis, unspecified cause    Malignant melanoma of left lower limb, including hip (CMS/HCC)    Personal history of malignant melanoma of skin     Melanoma 1: in situYear Diagnosed:  2017.  August.Location:  left lateral thighTreatment(s):  s/p excision         Personal history of other malignant neoplasm of skin     Lesion 1: Invasive SCCYear Diagnosed:  2022. January.Location:  Left Dorsal Hand.Treatment(s): MohsPathology:     Lesion 2: BCC nodular type. Year Diagnosed 2023. August. Location: Right Upper Chest. 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, including shoulder    Tinea cruris    Eczema     Past Medical History:  Juan Pineda  has a past medical history of Acute maxillary sinusitis, unspecified (06/27/2017), Body mass index (BMI) 28.0-28.9, adult (03/12/2019), Body mass index (BMI) 28.0-28.9, adult (10/09/2020), Body mass index (BMI) 29.0-29.9, adult (03/21/2020), Neoplasm of unspecified behavior of bone, soft tissue, and skin (06/28/2017), Nontoxic goiter, unspecified (10/18/2017), Nontoxic multinodular goiter (06/13/2023), Other conditions influencing health status (06/08/2015), Other conditions influencing health status (03/12/2019), Pain, unspecified (08/09/2016), Personal history of diseases of the skin and subcutaneous tissue (12/04/2014), Personal history of other diseases of male genital  organs, Personal history of other diseases of the musculoskeletal system and connective tissue, Prediabetes (12/09/2015), 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    Past Surgical History:  Juan Pineda  has a past surgical history that includes Knee arthroscopy w/ debridement (09/09/2013); Hernia repair (09/09/2013); Colonoscopy (09/09/2013); Rotator cuff repair (09/09/2013); Cataract extraction (02/18/2015); and Other surgical history (10/09/2020).    Family History:  Patient family history is not on file.    Social History:  Juan Pineda  reports that he has never smoked. He has never used smokeless tobacco. He reports that he does not drink alcohol and does not use drugs.    Allergies:  Lisinopril    Current Medications / CAM's:    Current Outpatient Medications:     acetaminophen (ARTHRITIS PAIN RELIEF, ACETAM, ORAL), Take 2 capsules by mouth once daily., Disp: , Rfl:     aspirin 81 mg EC tablet, Take 1 tablet (81 mg) by mouth once daily., Disp: , Rfl:     atorvastatin (Lipitor) 80 mg tablet, Take 1 tablet (80 mg) by mouth once daily in the evening., Disp: 90 tablet, Rfl: 1    azelastine (Astelin) 137 mcg (0.1 %) nasal spray, Administer 2 sprays into each nostril 2 times a day., Disp: 30 mL, Rfl: 5    cholecalciferol (Vitamin D-3) 25 MCG (1000 UT) capsule, Take by mouth., Disp: , Rfl:     docusate sodium (Colace) 100 mg capsule, Take 1 capsule (100 mg) by mouth 2 times a day., Disp: , Rfl:     hydroCHLOROthiazide (HYDRODiuril) 25 mg tablet, Take 1 tablet (25 mg) by mouth once daily., Disp: 90 tablet, Rfl: 1    losartan (Cozaar) 25 mg tablet, Take 1 tablet (25 mg) by mouth once daily., Disp: 90 tablet, Rfl: 1    metFORMIN XR (Glucophage-XR) 750 mg 24 hr tablet, TAKE 2 TABLETS (1,500 MG) BY MOUTH ONCE DAILY IN THE EVENING. TAKE WITH MEALS. DO NOT CRUSH, CHEW, OR SPLIT., Disp: 60 tablet,  Rfl: 0    metoprolol succinate XL (Toprol-XL) 100 mg 24 hr tablet, Take 1 tablet (100 mg) by mouth once daily. Do not crush or chew., Disp: 90 tablet, Rfl: 1    OneTouch Verio test strips strip, FOR TESTING ONE A DAY DX E 11.9, Disp: 100 strip, Rfl: 3    SITagliptin phosphate (Januvia) 100 mg tablet, Take 1 tablet (100 mg) by mouth once daily., Disp: 90 tablet, Rfl: 1    spironolactone (Aldactone) 25 mg tablet, Take 1 tablet (25 mg) by mouth once daily., Disp: 90 tablet, Rfl: 1    triamcinolone (Kenalog) 0.1 % cream, 0, Disp: , Rfl:      Objective   Well appearing patient in no apparent distress; mood and affect are within normal limits.    A full examination was performed including scalp, head, eyes, ears, nose, lips, neck, chest, axillae, abdomen, back, buttocks, bilateral upper extremities, bilateral lower extremities, hands, feet, fingers, toes, fingernails, and toenails. All findings within normal limits unless otherwise noted below.    Assessment/Plan   1. Actinic keratosis (3)  Left Forearm - Posterior, Right Malar Cheek (2)  Erythematous scaly macule(s)    -Discussed nature of diagnosis and treatment options.   -Patient wishes to proceed with Cryotherapy today  -Possible side effects of liquid nitrogen treatment reviewed including formation of blisters, crusting, tenderness, scar, and discoloration which may be permanent.  -Patient advised to return the office for re-evaluation if the treated lesion(s) do not resolve within 4-6 weeks. Patient verbalizes understanding.    Destr of lesion - Left Forearm - Posterior, Right Malar Cheek (2)  Complexity: simple    Destruction method: cryotherapy    Informed consent: discussed and consent obtained    Lesion destroyed using liquid nitrogen: Yes    Outcome: patient tolerated procedure well with no complications    Post-procedure details: wound care instructions given      2. Inflamed seborrheic keratosis  Right Parietal Scalp  Stuck-on, waxy  macule(s)/papule(s)/plaque(s) with comedo-like openings and milia-like cysts with surrounding erythema and crusting    -Patient requests cryotherapy today for these clinically inflamed lesions  -Possible side effects of liquid nitrogen treatment reviewed including formation of blisters, crusting, tenderness, scar, and discoloration which may be permanent.    Destr of lesion - Right Parietal Scalp  Complexity: simple    Destruction method: cryotherapy    Informed consent: discussed and consent obtained    Lesion destroyed using liquid nitrogen: Yes    Outcome: patient tolerated procedure well with no complications      3. Multiple benign nevi  Brown and tan macules and papules with reassuring findings on dermoscopy    -These lesions have benign, reassuring patterns on dermoscopy  -Recommend continued self observation, and to contact the office if any changes in nevi are noticed    4. Lentigo  Tan macules    -Benign appearing on exam  -Reassurance, recommend observation    5. Seborrheic keratosis  Stuck on, waxy macule(s)/papule(s)/plaque(s) with comedo-like openings and milia like cysts    -Discussed the nature of the diagnosis  -Reassurance, recommend continued observation    6. Personal history of malignant melanoma of skin  Lymph node basins palpated in relevant regions without appreciable adenopathy; regions include the neck and/or supraclavicular and/or axillary and/or inguinal and/or popliteal skin.    Personal History of Malignant Melanoma  -Well healed scar(s) with no evidence of recurrence  -Discussed the need for regular full skin examinations in the office, and monthly self examinations at home  -Discussed the need for annual ophthalmology exams with dilation  -Discussed concerning lesions and when to return sooner if any changes noted in skin lesions. Patient verbalizes understanding.    7. Personal history of skin cancer    Personal History of Non-Melanoma Skin Cancer  -Well healed scar(s) with no  evidence of recurrence  -Discussed the need for annual or semi-annual skin examinations and to return sooner if any new or changing lesions are noticed. Patient verbalizes understanding    8. Screening exam for skin cancer    Full body skin exam  -No lesions concerning for malignancy on the remainder the skin exam today   - The ugly duckling sign was discussed. Monitor for any skin lesions that are different in color, shape, or size than others on body  -Sun protection was discussed. Recommend SPF 30+, hats with brims, sun protective clothing, and avoiding sun exposure between 10 AM and 2 PM whenever possible  -Recommend regular skin exams or sooner if new or changing lesions       Related Procedures  Follow Up In Dermatology - Established Patient         Follow up 6 months Full Skin Exam

## 2024-04-04 DIAGNOSIS — I10 ESSENTIAL (PRIMARY) HYPERTENSION: ICD-10-CM

## 2024-04-04 RX ORDER — LOSARTAN POTASSIUM 25 MG/1
25 TABLET ORAL DAILY
Qty: 90 TABLET | Refills: 1 | Status: SHIPPED | OUTPATIENT
Start: 2024-04-04

## 2024-06-21 DIAGNOSIS — I10 ESSENTIAL (PRIMARY) HYPERTENSION: ICD-10-CM

## 2024-06-21 RX ORDER — ATORVASTATIN CALCIUM 80 MG/1
80 TABLET, FILM COATED ORAL EVERY EVENING
Qty: 90 TABLET | Refills: 1 | Status: SHIPPED | OUTPATIENT
Start: 2024-06-21 | End: 2024-12-18

## 2024-06-24 DIAGNOSIS — E11.9 CONTROLLED TYPE 2 DIABETES MELLITUS WITHOUT COMPLICATION, WITHOUT LONG-TERM CURRENT USE OF INSULIN (MULTI): ICD-10-CM

## 2024-06-24 RX ORDER — SITAGLIPTIN 100 MG/1
100 TABLET, FILM COATED ORAL DAILY
Qty: 90 TABLET | Refills: 1 | Status: SHIPPED | OUTPATIENT
Start: 2024-06-24

## 2024-06-29 DIAGNOSIS — I10 ESSENTIAL (PRIMARY) HYPERTENSION: ICD-10-CM

## 2024-07-01 RX ORDER — METOPROLOL SUCCINATE 100 MG/1
100 TABLET, EXTENDED RELEASE ORAL DAILY
Qty: 90 TABLET | Refills: 0 | Status: SHIPPED | OUTPATIENT
Start: 2024-07-01 | End: 2024-12-28

## 2024-07-09 ENCOUNTER — APPOINTMENT (OUTPATIENT)
Dept: PRIMARY CARE | Facility: CLINIC | Age: 79
End: 2024-07-09
Payer: MEDICARE

## 2024-07-25 DIAGNOSIS — I10 ESSENTIAL (PRIMARY) HYPERTENSION: ICD-10-CM

## 2024-07-25 RX ORDER — SPIRONOLACTONE 25 MG/1
25 TABLET ORAL DAILY
Qty: 90 TABLET | Refills: 1 | Status: SHIPPED | OUTPATIENT
Start: 2024-07-25

## 2024-08-01 ENCOUNTER — APPOINTMENT (OUTPATIENT)
Dept: PRIMARY CARE | Facility: CLINIC | Age: 79
End: 2024-08-01
Payer: MEDICARE

## 2024-08-01 VITALS
OXYGEN SATURATION: 98 % | HEIGHT: 71 IN | HEART RATE: 64 BPM | BODY MASS INDEX: 27.64 KG/M2 | SYSTOLIC BLOOD PRESSURE: 124 MMHG | DIASTOLIC BLOOD PRESSURE: 80 MMHG | WEIGHT: 197.4 LBS

## 2024-08-01 DIAGNOSIS — R35.1 BENIGN PROSTATIC HYPERPLASIA WITH NOCTURIA: ICD-10-CM

## 2024-08-01 DIAGNOSIS — E78.2 MIXED HYPERLIPIDEMIA: ICD-10-CM

## 2024-08-01 DIAGNOSIS — N40.1 BENIGN PROSTATIC HYPERPLASIA WITH NOCTURIA: ICD-10-CM

## 2024-08-01 DIAGNOSIS — E11.9 CONTROLLED TYPE 2 DIABETES MELLITUS WITHOUT COMPLICATION, WITHOUT LONG-TERM CURRENT USE OF INSULIN (MULTI): ICD-10-CM

## 2024-08-01 DIAGNOSIS — Z00.00 ROUTINE GENERAL MEDICAL EXAMINATION AT HEALTH CARE FACILITY: Primary | ICD-10-CM

## 2024-08-01 DIAGNOSIS — Z12.5 SCREENING FOR MALIGNANT NEOPLASM OF PROSTATE: ICD-10-CM

## 2024-08-01 DIAGNOSIS — C43.72 MALIGNANT MELANOMA OF LEFT LOWER LIMB, INCLUDING HIP (MULTI): ICD-10-CM

## 2024-08-01 DIAGNOSIS — E11.9 TYPE 2 DIABETES MELLITUS WITHOUT COMPLICATIONS (MULTI): ICD-10-CM

## 2024-08-01 DIAGNOSIS — I10 ESSENTIAL (PRIMARY) HYPERTENSION: ICD-10-CM

## 2024-08-01 LAB
ALBUMIN SERPL BCP-MCNC: 3.7 G/DL (ref 3.4–5)
ALP SERPL-CCNC: 91 U/L (ref 33–136)
ALT SERPL W P-5'-P-CCNC: 24 U/L (ref 10–52)
ANION GAP SERPL CALC-SCNC: 15 MMOL/L (ref 10–20)
AST SERPL W P-5'-P-CCNC: 17 U/L (ref 9–39)
BILIRUB SERPL-MCNC: 1.5 MG/DL (ref 0–1.2)
BUN SERPL-MCNC: 14 MG/DL (ref 6–23)
CALCIUM SERPL-MCNC: 9.2 MG/DL (ref 8.6–10.3)
CHLORIDE SERPL-SCNC: 99 MMOL/L (ref 98–107)
CHOLEST SERPL-MCNC: 128 MG/DL (ref 0–199)
CHOLESTEROL/HDL RATIO: 3.8
CO2 SERPL-SCNC: 25 MMOL/L (ref 21–32)
CREAT SERPL-MCNC: 0.79 MG/DL (ref 0.5–1.3)
CREAT UR-MCNC: 57.8 MG/DL (ref 20–370)
EGFRCR SERPLBLD CKD-EPI 2021: 90 ML/MIN/1.73M*2
GLUCOSE SERPL-MCNC: 196 MG/DL (ref 74–99)
HDLC SERPL-MCNC: 33.3 MG/DL
LDLC SERPL CALC-MCNC: 70 MG/DL
NON HDL CHOLESTEROL: 95 MG/DL (ref 0–149)
POC APPEARANCE, URINE: CLEAR
POC BILIRUBIN, URINE: NEGATIVE
POC BLOOD, URINE: NEGATIVE
POC COLOR, URINE: YELLOW
POC GLUCOSE, URINE: NEGATIVE MG/DL
POC HEMOGLOBIN A1C: 8 % (ref 4.2–6.5)
POC KETONES, URINE: NEGATIVE MG/DL
POC LEUKOCYTES, URINE: NEGATIVE
POC NITRITE,URINE: NEGATIVE
POC PH, URINE: 5.5 PH
POC PROTEIN, URINE: NEGATIVE MG/DL
POC SPECIFIC GRAVITY, URINE: 1.02
POC UROBILINOGEN, URINE: 0.2 EU/DL
POTASSIUM SERPL-SCNC: 4.9 MMOL/L (ref 3.5–5.3)
PROT SERPL-MCNC: 6.3 G/DL (ref 6.4–8.2)
PROT UR-ACNC: 12 MG/DL (ref 5–25)
PROT/CREAT UR: 0.21 MG/MG CREAT (ref 0–0.17)
SODIUM SERPL-SCNC: 134 MMOL/L (ref 136–145)
TRIGL SERPL-MCNC: 122 MG/DL (ref 0–149)
TSH SERPL-ACNC: 1.37 MIU/L (ref 0.44–3.98)
VLDL: 24 MG/DL (ref 0–40)

## 2024-08-01 PROCEDURE — 84443 ASSAY THYROID STIM HORMONE: CPT

## 2024-08-01 PROCEDURE — 1170F FXNL STATUS ASSESSED: CPT | Performed by: FAMILY MEDICINE

## 2024-08-01 PROCEDURE — 3074F SYST BP LT 130 MM HG: CPT | Performed by: FAMILY MEDICINE

## 2024-08-01 PROCEDURE — 99397 PER PM REEVAL EST PAT 65+ YR: CPT | Performed by: FAMILY MEDICINE

## 2024-08-01 PROCEDURE — 83036 HEMOGLOBIN GLYCOSYLATED A1C: CPT | Performed by: FAMILY MEDICINE

## 2024-08-01 PROCEDURE — 1123F ACP DISCUSS/DSCN MKR DOCD: CPT | Performed by: FAMILY MEDICINE

## 2024-08-01 PROCEDURE — 81003 URINALYSIS AUTO W/O SCOPE: CPT | Performed by: FAMILY MEDICINE

## 2024-08-01 PROCEDURE — 1158F ADVNC CARE PLAN TLK DOCD: CPT | Performed by: FAMILY MEDICINE

## 2024-08-01 PROCEDURE — 80061 LIPID PANEL: CPT

## 2024-08-01 PROCEDURE — 1157F ADVNC CARE PLAN IN RCRD: CPT | Performed by: FAMILY MEDICINE

## 2024-08-01 PROCEDURE — 3079F DIAST BP 80-89 MM HG: CPT | Performed by: FAMILY MEDICINE

## 2024-08-01 PROCEDURE — 1159F MED LIST DOCD IN RCRD: CPT | Performed by: FAMILY MEDICINE

## 2024-08-01 PROCEDURE — G0103 PSA SCREENING: HCPCS

## 2024-08-01 PROCEDURE — 80053 COMPREHEN METABOLIC PANEL: CPT

## 2024-08-01 PROCEDURE — 99214 OFFICE O/P EST MOD 30 MIN: CPT | Performed by: FAMILY MEDICINE

## 2024-08-01 PROCEDURE — G0439 PPPS, SUBSEQ VISIT: HCPCS | Performed by: FAMILY MEDICINE

## 2024-08-01 PROCEDURE — 84156 ASSAY OF PROTEIN URINE: CPT

## 2024-08-01 PROCEDURE — 36415 COLL VENOUS BLD VENIPUNCTURE: CPT

## 2024-08-01 PROCEDURE — 1160F RVW MEDS BY RX/DR IN RCRD: CPT | Performed by: FAMILY MEDICINE

## 2024-08-01 PROCEDURE — 82570 ASSAY OF URINE CREATININE: CPT

## 2024-08-01 RX ORDER — METFORMIN HYDROCHLORIDE 750 MG/1
1500 TABLET, EXTENDED RELEASE ORAL
Qty: 60 TABLET | Refills: 0 | OUTPATIENT
Start: 2024-08-01 | End: 2025-01-28

## 2024-08-01 ASSESSMENT — PATIENT HEALTH QUESTIONNAIRE - PHQ9
SUM OF ALL RESPONSES TO PHQ9 QUESTIONS 1 AND 2: 0
2. FEELING DOWN, DEPRESSED OR HOPELESS: NOT AT ALL
1. LITTLE INTEREST OR PLEASURE IN DOING THINGS: NOT AT ALL
2. FEELING DOWN, DEPRESSED OR HOPELESS: NOT AT ALL
1. LITTLE INTEREST OR PLEASURE IN DOING THINGS: NOT AT ALL
SUM OF ALL RESPONSES TO PHQ9 QUESTIONS 1 AND 2: 0

## 2024-08-01 ASSESSMENT — ACTIVITIES OF DAILY LIVING (ADL)
BATHING: INDEPENDENT
TAKING_MEDICATION: INDEPENDENT
DOING_HOUSEWORK: INDEPENDENT
DRESSING: INDEPENDENT
MANAGING_FINANCES: INDEPENDENT
GROCERY_SHOPPING: INDEPENDENT

## 2024-08-01 NOTE — PROGRESS NOTES
Subjective   Reason for Visit: Gary Pineda is an 79 y.o. male here for a Medicare Wellness visit.     Past Medical, Surgical, and Family History reviewed and updated in chart.    Reviewed all medications by prescribing practitioner or clinical pharmacist (such as prescriptions, OTCs, herbal therapies and supplements) and documented in the medical record.    HPI  No SE meds- legs will feel tired  No CP, palpitations, SOB, numbness, weakness, dizziness, HA, visuin changes    Getting some pain in lower neck and across back of shoulder- just comes and goes  Better- sitting  Worse- doing too much    Ophtho- 5/24  Dentist- due to see  [facility]- 8/24  Colonoscopy-  NEHEMIAS-  FOBT-  PSA- ordered  UA/Micro- 12/23  Lung CT-   Coronary Calcium CT Score-  AAA-  EKG-  Pneumovax- 10/20  Prevnar-  Flu- 12/23  Shingrix-  Td-6/23  Hep C-  Advance Directives-  AWV- 8/1/24  ETOH screen- does not drink  MDD screen- 8/1/24        Patient Care Team:  Kale Cazares DO as PCP - General (Family Medicine)  Hermelindo Perez DO as PCP - [employer]vantage PCP  Faustino Gomes MD as Surgeon (Urology)  Cornelio Brewer MD as Surgeon (Ophthalmology)  Georges Lovett MD as Surgeon (Otolaryngology)  Calli Bauer MD as Consulting Physician (Dermatology)  Gareth Tinajero MD as Consulting Physician (Orthopaedic Surgery)     Review of Systems   Constitutional:  Negative for chills, fatigue and fever.   HENT:  Negative for congestion, ear discharge, ear pain, hearing loss, nosebleeds, sore throat, tinnitus and trouble swallowing.    Eyes:  Negative for pain, redness and visual disturbance.   Respiratory:  Negative for cough, chest tightness, shortness of breath and wheezing.    Cardiovascular:  Negative for chest pain, palpitations and leg swelling.   Gastrointestinal:  Negative for abdominal pain, blood in stool, constipation, diarrhea, nausea and vomiting.   Endocrine: Negative for cold intolerance, heat intolerance, polydipsia, polyphagia and  "polyuria.   Genitourinary:  Negative for dysuria, frequency, hematuria and urgency.   Musculoskeletal:  Positive for arthralgias, neck pain and neck stiffness. Negative for back pain and gait problem.   Skin:  Negative for color change and rash.   Neurological:  Negative for dizziness, tremors, syncope, weakness, numbness and headaches.   Hematological:  Negative for adenopathy. Does not bruise/bleed easily.   Psychiatric/Behavioral:  Negative for dysphoric mood. The patient is not nervous/anxious.        Objective   Vitals:  /80   Pulse 64   Ht 1.803 m (5' 11\")   Wt 89.5 kg (197 lb 6.4 oz)   SpO2 98%   BMI 27.53 kg/m²       Physical Exam  Vitals and nursing note reviewed.   Constitutional:       General: He is not in acute distress.     Appearance: Normal appearance.   HENT:      Head: Normocephalic and atraumatic.      Right Ear: Tympanic membrane, ear canal and external ear normal.      Left Ear: Tympanic membrane, ear canal and external ear normal.      Nose: Nose normal.      Mouth/Throat:      Mouth: Mucous membranes are moist.      Pharynx: Oropharynx is clear.   Eyes:      Extraocular Movements: Extraocular movements intact.      Conjunctiva/sclera: Conjunctivae normal.      Pupils: Pupils are equal, round, and reactive to light.   Neck:      Vascular: No carotid bruit.   Cardiovascular:      Rate and Rhythm: Normal rate and regular rhythm.      Pulses: Normal pulses.           Dorsalis pedis pulses are 2+ on the right side and 2+ on the left side.        Posterior tibial pulses are 2+ on the right side and 2+ on the left side.      Heart sounds: Normal heart sounds. No murmur heard.  Pulmonary:      Effort: Pulmonary effort is normal.      Breath sounds: Normal breath sounds. No wheezing, rhonchi or rales.   Abdominal:      General: Abdomen is flat. Bowel sounds are normal.      Palpations: Abdomen is soft. There is no mass.   Musculoskeletal:         General: Normal range of motion.      " Cervical back: Normal range of motion and neck supple.      Right foot: Normal range of motion. No deformity.      Left foot: Normal range of motion. No deformity.   Feet:      Right foot:      Protective Sensation: 7 sites tested.  7 sites sensed.      Skin integrity: Skin integrity normal.      Toenail Condition: Right toenails are normal.      Left foot:      Protective Sensation: 7 sites tested.  7 sites sensed.      Skin integrity: Skin integrity normal.      Toenail Condition: Left toenails are normal.   Lymphadenopathy:      Cervical: No cervical adenopathy.   Skin:     Capillary Refill: Capillary refill takes less than 2 seconds.   Neurological:      General: No focal deficit present.      Mental Status: He is alert and oriented to person, place, and time.      Cranial Nerves: No cranial nerve deficit.      Motor: No weakness.      Deep Tendon Reflexes: Reflexes normal.   Psychiatric:         Mood and Affect: Mood normal.         Behavior: Behavior normal.         Assessment/Plan   Problem List Items Addressed This Visit             ICD-10-CM    Mixed hyperlipidemia E78.2    Malignant melanoma of left lower limb, including hip (Multi) C43.72    Essential (primary) hypertension I10    Controlled type 2 diabetes mellitus without complication, without long-term current use of insulin (Multi) E11.9    Benign prostatic hyperplasia with lower urinary tract symptoms N40.1     Other Visit Diagnoses         Codes    Routine general medical examination at health care facility    -  Primary Z00.00          Renewed/continued rest of medications  Checked  labs  Updated Health Maintenance in HPI section  HTN, controlled- continue meds, low sodium diet    Overweight- caloric restriction, increase CV exercise    Hyperlipidemia- continue meds, low fat/cholesterol diet    BPH- limit caffeine, avoid fluid before bed    History of Melanoma- follow with dermatology/oncology    DM, type 2- avoid sugars, low carbs, increase CV  exercise, continue meds, check feet daily

## 2024-08-02 LAB — PSA SERPL-MCNC: 2.22 NG/ML

## 2024-08-02 ASSESSMENT — ENCOUNTER SYMPTOMS
NECK PAIN: 1
POLYDIPSIA: 0
TREMORS: 0
WEAKNESS: 0
NERVOUS/ANXIOUS: 0
WHEEZING: 0
DYSURIA: 0
PALPITATIONS: 0
TROUBLE SWALLOWING: 0
NAUSEA: 0
VOMITING: 0
BLOOD IN STOOL: 0
ARTHRALGIAS: 1
CHEST TIGHTNESS: 0
FATIGUE: 0
COLOR CHANGE: 0
BACK PAIN: 0
NUMBNESS: 0
NECK STIFFNESS: 1
EYE REDNESS: 0
COUGH: 0
DYSPHORIC MOOD: 0
SORE THROAT: 0
HEADACHES: 0
HEMATURIA: 0
FEVER: 0
ABDOMINAL PAIN: 0
ADENOPATHY: 0
DIZZINESS: 0
DIARRHEA: 0
CONSTIPATION: 0
BRUISES/BLEEDS EASILY: 0
FREQUENCY: 0
CHILLS: 0
POLYPHAGIA: 0
EYE PAIN: 0
SHORTNESS OF BREATH: 0

## 2024-08-27 ENCOUNTER — APPOINTMENT (OUTPATIENT)
Dept: DERMATOLOGY | Facility: CLINIC | Age: 79
End: 2024-08-27
Payer: MEDICARE

## 2024-08-27 DIAGNOSIS — D48.5 NEOPLASM OF UNCERTAIN BEHAVIOR OF SKIN: ICD-10-CM

## 2024-08-27 DIAGNOSIS — Z12.83 SCREENING EXAM FOR SKIN CANCER: ICD-10-CM

## 2024-08-27 DIAGNOSIS — L82.1 SEBORRHEIC KERATOSIS: ICD-10-CM

## 2024-08-27 DIAGNOSIS — L91.8 SKIN TAG: ICD-10-CM

## 2024-08-27 DIAGNOSIS — D22.9 MULTIPLE BENIGN NEVI: Primary | ICD-10-CM

## 2024-08-27 DIAGNOSIS — Z85.820 PERSONAL HISTORY OF MALIGNANT MELANOMA OF SKIN: ICD-10-CM

## 2024-08-27 DIAGNOSIS — Z85.828 PERSONAL HISTORY OF SKIN CANCER: ICD-10-CM

## 2024-08-27 DIAGNOSIS — L81.4 LENTIGO: ICD-10-CM

## 2024-08-27 DIAGNOSIS — L57.0 ACTINIC KERATOSIS: ICD-10-CM

## 2024-08-27 PROCEDURE — 1160F RVW MEDS BY RX/DR IN RCRD: CPT | Performed by: DERMATOLOGY

## 2024-08-27 PROCEDURE — 17003 DESTRUCT PREMALG LES 2-14: CPT | Performed by: DERMATOLOGY

## 2024-08-27 PROCEDURE — 1159F MED LIST DOCD IN RCRD: CPT | Performed by: DERMATOLOGY

## 2024-08-27 PROCEDURE — 99213 OFFICE O/P EST LOW 20 MIN: CPT | Performed by: DERMATOLOGY

## 2024-08-27 PROCEDURE — 1123F ACP DISCUSS/DSCN MKR DOCD: CPT | Performed by: DERMATOLOGY

## 2024-08-27 PROCEDURE — 1157F ADVNC CARE PLAN IN RCRD: CPT | Performed by: DERMATOLOGY

## 2024-08-27 PROCEDURE — 1036F TOBACCO NON-USER: CPT | Performed by: DERMATOLOGY

## 2024-08-27 PROCEDURE — 17000 DESTRUCT PREMALG LESION: CPT | Performed by: DERMATOLOGY

## 2024-08-27 PROCEDURE — 11102 TANGNTL BX SKIN SINGLE LES: CPT | Performed by: DERMATOLOGY

## 2024-08-27 ASSESSMENT — DERMATOLOGY PATIENT ASSESSMENT
DO YOU HAVE ANY NEW OR CHANGING LESIONS: NO
HAVE YOU HAD OR DO YOU HAVE A STAPH INFECTION: NO
HAVE YOU HAD OR DO YOU HAVE VASCULAR DISEASE: NO
DO YOU USE SUNSCREEN: OCCASIONALLY
ARE YOU AN ORGAN TRANSPLANT RECIPIENT: NO
DO YOU USE A TANNING BED: NO

## 2024-08-27 ASSESSMENT — DERMATOLOGY QUALITY OF LIFE (QOL) ASSESSMENT
RATE HOW BOTHERED YOU ARE BY SYMPTOMS OF YOUR SKIN PROBLEM (EG, ITCHING, STINGING BURNING, HURTING OR SKIN IRRITATION): 0 - NEVER BOTHERED
DATE THE QUALITY-OF-LIFE ASSESSMENT WAS COMPLETED: 67079
RATE HOW EMOTIONALLY BOTHERED YOU ARE BY YOUR SKIN PROBLEM (FOR EXAMPLE, WORRY, EMBARRASSMENT, FRUSTRATION): 0 - NEVER BOTHERED
ARE THERE EXCLUSIONS OR EXCEPTIONS FOR THE QUALITY OF LIFE ASSESSMENT: NO
RATE HOW BOTHERED YOU ARE BY EFFECTS OF YOUR SKIN PROBLEMS ON YOUR ACTIVITIES (EG, GOING OUT, ACCOMPLISHING WHAT YOU WANT, WORK ACTIVITIES OR YOUR RELATIONSHIPS WITH OTHERS): 0 - NEVER BOTHERED

## 2024-08-27 ASSESSMENT — ITCH NUMERIC RATING SCALE: HOW SEVERE IS YOUR ITCHING?: 0

## 2024-08-27 ASSESSMENT — PATIENT GLOBAL ASSESSMENT (PGA): PATIENT GLOBAL ASSESSMENT: PATIENT GLOBAL ASSESSMENT:  1 - CLEAR

## 2024-08-27 NOTE — PROGRESS NOTES
Subjective     Juan Pineda is a 79 y.o. male who presents for the following: Skin Check.     Last derm visit 2/13/24 for Full Skin Exam, history of melanoma in situ 2017 and 2 nonmelanoma skin cancers - 3 actinic keratoses treated with liquid nitrogen     Review of Systems:  No other skin or systemic complaints other than what is documented elsewhere in the note.    The following portions of the chart were reviewed this encounter and updated as appropriate:  Tobacco  Allergies  Meds  Problems  Med Hx  Surg Hx         Skin Cancer History  No skin cancer on file.      Specialty Problems          Dermatology Problems    Actinic keratosis    Allergic contact dermatitis, unspecified cause    Malignant melanoma of left lower limb, including hip (Multi)    Personal history of malignant melanoma of skin     Melanoma 1: in situYear Diagnosed:  2017.  August.Location:  left lateral thighTreatment(s):  s/p excision         Personal history of other malignant neoplasm of skin     Lesion 1: Invasive SCCYear Diagnosed:  2022.  January.Location:  Left Dorsal Hand.Treatment(s): MohsPathology:     Lesion 2: BCC nodular type. Year Diagnosed 2023. August. Location: Right Upper Chest. 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    A full examination was performed including scalp, head, eyes, ears, nose, lips, neck, chest, axillae, abdomen, back, buttocks, bilateral upper extremities, bilateral lower extremities, hands, feet, fingers, toes, fingernails, and toenails. All findings within normal limits unless otherwise noted below.    Assessment/Plan   1. Multiple benign nevi  Brown and tan macules and papules with reassuring findings on dermoscopy    -These lesions have benign, reassuring patterns on dermoscopy  -Recommend continued  self observation, and to contact the office if any changes in nevi are noticed    2. Screening exam for skin cancer    Full body skin exam  -No lesions concerning for malignancy on the remainder the skin exam today   - The ugly duckling sign was discussed. Monitor for any skin lesions that are different in color, shape, or size than others on body  -Sun protection was discussed. Recommend SPF 30+, hats with brims, sun protective clothing, and avoiding sun exposure between 10 AM and 2 PM whenever possible  -Recommend regular skin exams or sooner if new or changing lesions       Related Procedures  Follow Up In Dermatology - Established Patient  Follow Up In Dermatology - Established Patient    3. Lentigo  Tan macules    -Benign appearing on exam  -Reassurance, recommend observation    4. Seborrheic keratosis  Stuck on, waxy macule(s)/papule(s)/plaque(s) with comedo-like openings and milia like cysts    -Discussed the nature of the diagnosis  -Reassurance, recommend continued observation    5. Personal history of malignant melanoma of skin  Lymph node basins palpated in relevant regions without appreciable adenopathy; regions include the neck and/or supraclavicular and/or axillary and/or inguinal and/or popliteal skin.    Personal History of Malignant Melanoma  -Well healed scar(s) with no evidence of recurrence  -Discussed the need for regular full skin examinations in the office, and monthly self examinations at home  -Discussed the need for annual ophthalmology exams with dilation  -Discussed concerning lesions and when to return sooner if any changes noted in skin lesions. Patient verbalizes understanding.    6. Personal history of skin cancer    Personal History of Non-Melanoma Skin Cancer  -Well healed scar(s) with no evidence of recurrence  -Discussed the need for annual or semi-annual skin examinations and to return sooner if any new or changing lesions are noticed. Patient verbalizes understanding    7.  Neoplasm of uncertain behavior of skin  Right Preauricular Area  0.3 cm pink brown papule with irregular pigment          Lesion biopsy  Type of biopsy: tangential    Informed consent: discussed and consent obtained    Timeout: patient name, date of birth, surgical site, and procedure verified    Procedure prep:  Patient was prepped and draped  Anesthesia: the lesion was anesthetized in a standard fashion    Anesthetic:  1% lidocaine w/ epinephrine 1-100,000 local infiltration  Instrument used: DermaBlade    Hemostasis achieved with: aluminum chloride    Outcome: patient tolerated procedure well    Post-procedure details: sterile dressing applied and wound care instructions given    Dressing type: petrolatum and bandage      Staff Communication: Dermatology Local Anesthesia: Lidocaine 0.5% with Epinephrine 1;200,000 - Amount: 3 ml    Specimen 1 - Dermatopathology- DERM LAB  Differential Diagnosis: basal cell carcinoma vs intradermal nevus  Check Margins Yes/No?:    Comments:    Dermpath Lab: Routine Histopathology (formalin-fixed tissue)    8. Actinic keratosis (6)  Left Buccal Cheek (2), Left Nasal Sidewall, Mid Parietal Scalp (2), Right Mid Helix  Erythematous scaly macule(s)    Actinic keratoses treated at last visit resolved, new ones today    -Discussed nature of diagnosis and treatment options.   -Patient wishes to proceed with Cryotherapy today  -Possible side effects of liquid nitrogen treatment reviewed including formation of blisters, crusting, tenderness, scar, and discoloration which may be permanent.  -Patient advised to return the office for re-evaluation if the treated lesion(s) do not resolve within 4-6 weeks. Patient verbalizes understanding.    Destr of lesion - Left Buccal Cheek (2), Left Nasal Sidewall, Mid Parietal Scalp (2), Right Mid Helix  Complexity: simple    Destruction method: cryotherapy    Informed consent: discussed and consent obtained    Lesion destroyed using liquid nitrogen: Yes     Outcome: patient tolerated procedure well with no complications    Post-procedure details: wound care instructions given      9. Skin tag  Right Supraorbital Region  Flesh colored pedunculated papule(s); there is no clinically evident irritation or inflammation    -Discussed nature of the condition  -Reassurance    -Removed today with liquid nitrogen as a courtesy        Follow up 1 year Full Skin Exam or sooner pending path

## 2024-08-29 LAB
LABORATORY COMMENT REPORT: NORMAL
PATH REPORT.FINAL DX SPEC: NORMAL
PATH REPORT.GROSS SPEC: NORMAL
PATH REPORT.MICROSCOPIC SPEC OTHER STN: NORMAL
PATH REPORT.RELEVANT HX SPEC: NORMAL
PATH REPORT.TOTAL CANCER: NORMAL

## 2024-09-03 DIAGNOSIS — C44.91 BASAL CELL CARCINOMA (BCC), UNSPECIFIED SITE: Primary | ICD-10-CM

## 2024-09-29 DIAGNOSIS — I10 ESSENTIAL (PRIMARY) HYPERTENSION: ICD-10-CM

## 2024-09-30 RX ORDER — METOPROLOL SUCCINATE 100 MG/1
100 TABLET, EXTENDED RELEASE ORAL DAILY
Qty: 90 TABLET | Refills: 1 | Status: SHIPPED | OUTPATIENT
Start: 2024-09-30 | End: 2025-03-29

## 2024-10-01 ENCOUNTER — APPOINTMENT (OUTPATIENT)
Dept: DERMATOLOGY | Facility: CLINIC | Age: 79
End: 2024-10-01
Payer: MEDICARE

## 2024-10-01 VITALS — SYSTOLIC BLOOD PRESSURE: 124 MMHG | HEART RATE: 47 BPM | DIASTOLIC BLOOD PRESSURE: 68 MMHG

## 2024-10-01 DIAGNOSIS — C44.310 BASAL CELL CARCINOMA (BCC) OF SKIN OF FACE, UNSPECIFIED PART OF FACE: ICD-10-CM

## 2024-10-01 PROCEDURE — 13131 CMPLX RPR F/C/C/M/N/AX/G/H/F: CPT | Performed by: DERMATOLOGY

## 2024-10-01 PROCEDURE — 17311 MOHS 1 STAGE H/N/HF/G: CPT | Performed by: DERMATOLOGY

## 2024-10-01 PROCEDURE — 99214 OFFICE O/P EST MOD 30 MIN: CPT | Performed by: DERMATOLOGY

## 2024-10-01 NOTE — PROGRESS NOTES
Mohs Surgery Operative Note    Date of Surgery:  10/1/2024  Surgeon:  Didi Browning MD  Office Location:  [address] [facility]  [address] [facility] [address]  [address]  [facility] [address]  Dept: [phone]  Dept Fax: [phone]  Referring Provider: Calli Bauer MD  [address]   [facility] [address]  [place] [address]      Assessment/Plan   Pre-procedure:   Obtained informed consent: written from patient  The surgical site was identified and confirmed with the patient.     Intra-operative:   Audible time out called at : 9:55 AM 10/01/24  by: Felicity Ma RN   Verified patient name, birthdate, site, specimen bottle label & requisition.    The planned procedure(s) was again reviewed with the patient. The risks of bleeding, infection, nerve damage and scarring were reviewed. Written authorization was obtained. The patient identity, surgical site, and planned procedure(s) were verified. The provider acted as both surgeon and pathologist.     Basal cell carcinoma (BCC) of skin of face, unspecified part of face  Right Preauricular Area  Mohs surgery  Consent obtained: written    Universal Protocol:  Procedure explained and questions answered to patient or proxy's satisfaction: Yes    Test results available and properly labeled: Yes    Pathology report reviewed: Yes    External notes reviewed: Yes    Photo or diagram used for site identification: Yes    Site/side marked: Yes    Slide independently reviewed by Mohs surgeon: Yes    Immediately prior to procedure a time out was called: Yes    Patient identity confirmed: verbally with patient  Preparation: Patient was prepped and draped in usual sterile fashion      Anticoagulation:  Is the patient taking prescription anticoagulant and/or aspirin prescribed/recommended by a physician? Yes    Was the anticoagulation regimen changed prior to Mohs? No      Anesthesia:  Anesthesia method: local infiltration  Local anesthetic: lidocaine 2%  WITH epi    Procedure Details:  Case ID Number: [id number]  Biopsy accession number: [id number]  Date of biopsy: 8/27/2024  Pre-Op diagnosis: basal cell carcinoma  BCC subtype: nodular  Surgical site (from skin exam): Right Preauricular Area  Pre-operative length (cm): 0.9  Pre-operative width (cm): 0.5  Indications for Mohs surgery: anatomic location where tissue conservation is critical  Previously treated? No      Micrographic Surgery Details:  Post-operative length (cm): 1.1  Post-operative width (cm): 0.6  Number of Mohs stages: 1    Stage 1     Comments: The patient was brought into the operating room and placed in the procedure chair in the appropriate position.  The area positive by previous biopsy was identified and confirmed with the patient. The area of clinically obvious tumor was debulked using a curette and/or scalpel as needed. An incision was made following the Mohs approach through the skin. The specimen was taken to the lab, divided into 2 piece(s) and appropriately chromacoded and processed.  Tumor features identified on Mohs section: no tumor identified  Depth of defect: subcutaneous fat    Patient tolerance of procedure: tolerated well, no immediate complications    Reconstruction:  Was the defect reconstructed? Yes    Was reconstruction performed by the same Mohs surgeon? Yes    Setting of reconstruction: outpatient office  When was reconstruction performed? same day  Type of reconstruction: linear  Linear reconstruction: complex  Length of linear repair (cm): 2  Subcutaneous Layers (Deep Stitches)   Suture size:  5-0  Suture type:  Vicryl  Stitches:  Buried vertical mattress  Fine/surface layer approximation (top stitches)   Epidermal/Superficial suture size:  5-0  Epidermal/Superficial suture type:  Fast-absorbing gut  Stitches: simple running    Hemostasis achieved with: electrodesiccation  Outcome: patient tolerated procedure well with no complications    Post-procedure details: sterile  dressing applied and wound care instructions given    Dressing type: Telfa pad, pressure dressing and Hypafix      Complex Linear Repair - Wide Undermining:  Given the location and size of the defect, it was determined that a complex layered linear closure was required to restore normal anatomy and function. The repair was considered complex because extensive undermining was required and performed. The amount of undermining performed was greater than the maximum width of the defect as measured perpendicular to the closure line along at least one entire edge of the defect. Standing cutaneous cones were removed using Burow's triangles. The wound edges were brought into close approximation with buried vertical mattress sutures. The remainder of the wound was then closed with epidermal top sutures.    The final repair measured 2 cm              Wound care was discussed, and the patient was given written post-operative wound care instructions.      The patient will follow up with Didi Browning MD as needed for any post operative problems or concerns, and will follow up with their primary dermatologist as scheduled.

## 2024-10-01 NOTE — PROGRESS NOTES
"Office Visit Note  Date: 10/1/2024  Surgeon:  Didi Browning MD  Office Location: DO [address] [facility]  [address]  [address]  [facility] [address]  Dept: [phone]  Dept Fax: [phone]  Referring Provider: Calli Bauer MD  [address]   [facility] [address]  [place] [address]    Subjective   Gary Pineda \"Juan\" is a 79 y.o. male who presents for the following: MOHS Surgery (Right Preauricular Area- BCC)    According to the patient, the lesion has been present for approximately 1 month at the time of diagnosis.  The lesion is not causing symptoms.  The lesion has not been treated previously.    The patient does not have a pacemaker / defibrillator.  The patient does have a heart valve / joint replacement.    The patient is on blood thinners.  The patient does not have a history of hepatitis B or C.  The patient does not have a history of HIV.  The patient does not have a history of immunosuppression (e.g. organ transplantation, malignancy, medications)    Review of Systems:  No other skin or systemic complaints other than what is documented elsewhere in the note.    MEDICAL HISTORY: clinically relevant history including significant past medical history, medications and allergies was reviewed and documented in Epic.    Objective   Well appearing patient in no apparent distress; mood and affect are within normal limits.  Vital signs: See record.  Noted on the Right Preauricular Area  Is a 0.9 x 0.5 cm scar    The patient confirmed the identified site.    Discussion:  The nature of the diagnosis was explained. The lesion is a skin cancer.  It has a risk of local growth and distant spread. The condition is associated with sun exposure.  Warning signs of non-melanoma skin cancer discussed. Patient was instructed to perform monthly self skin examination.  We recommended that the patient have regular full skin exams given an increased risk of subsequent skin cancers. " The patient was instructed to use sun protective behaviors including use of broad spectrum sunscreens and sun protective clothing to reduce risk of skin cancers.      Risks, benefits, side effects of Mohs surgery were discussed with patient and the patient voiced understanding.  It was explained that even though the cure rate of Mohs is very high it is not 100%. Risks of surgery including but not limited to bleeding, infection, numbness, nerve damage, and scar were reviewed.  Discussion included wound care requirements, activity restrictions, likely scar outcome and time to heal.     After Mohs surgery, the defect may need to be repaired surgically and the scar may be longer than the original lesion.  Reconstruction options, risks, and benefits were reviewed including second intention healing, linear repair (4-1 ratio was explained), local flaps, skin grafts, cartilage grafts and interpolation flaps (the need for multiple surgeries was explained). Possible outcomes were reviewed including likely scar appearance, failure of flap survival, infection, bleeding and the need for revision surgery.     The pathology was reviewed, the photograph was reviewed, and the referring physician's note was reviewed.    Patient elected for Mohs surgery.

## 2024-12-09 ENCOUNTER — TELEPHONE (OUTPATIENT)
Dept: PRIMARY CARE | Facility: CLINIC | Age: 79
End: 2024-12-09
Payer: MEDICARE

## 2024-12-09 DIAGNOSIS — B35.6 TINEA CRURIS: Primary | ICD-10-CM

## 2024-12-09 RX ORDER — NYSTATIN 100000 U/G
CREAM TOPICAL 2 TIMES DAILY
Qty: 30 G | Refills: 0 | Status: SHIPPED | OUTPATIENT
Start: 2024-12-09 | End: 2024-12-16

## 2024-12-09 NOTE — TELEPHONE ENCOUNTER
He has a rash in his private area he thinks its from the jardiance, can you [lease call him in something for it

## 2024-12-12 DIAGNOSIS — E11.9 CONTROLLED TYPE 2 DIABETES MELLITUS WITHOUT COMPLICATION, WITHOUT LONG-TERM CURRENT USE OF INSULIN (MULTI): ICD-10-CM

## 2024-12-12 RX ORDER — SITAGLIPTIN 100 MG/1
100 TABLET, FILM COATED ORAL DAILY
Qty: 90 TABLET | Refills: 1 | Status: SHIPPED | OUTPATIENT
Start: 2024-12-12

## 2024-12-27 DIAGNOSIS — L20.84 INTRINSIC ECZEMA: Primary | ICD-10-CM

## 2024-12-27 RX ORDER — TRIAMCINOLONE ACETONIDE 1 MG/G
CREAM TOPICAL 2 TIMES DAILY
Qty: 80 G | Refills: 0 | Status: SHIPPED | OUTPATIENT
Start: 2024-12-27

## 2024-12-31 DIAGNOSIS — I10 ESSENTIAL (PRIMARY) HYPERTENSION: ICD-10-CM

## 2024-12-31 RX ORDER — LOSARTAN POTASSIUM 25 MG/1
25 TABLET ORAL DAILY
Qty: 90 TABLET | Refills: 1 | Status: SHIPPED | OUTPATIENT
Start: 2024-12-31

## 2025-01-03 DIAGNOSIS — I10 ESSENTIAL (PRIMARY) HYPERTENSION: ICD-10-CM

## 2025-01-03 RX ORDER — ATORVASTATIN CALCIUM 80 MG/1
80 TABLET, FILM COATED ORAL EVERY EVENING
Qty: 90 TABLET | Refills: 1 | Status: SHIPPED | OUTPATIENT
Start: 2025-01-03 | End: 2025-07-02

## 2025-01-30 DIAGNOSIS — E11.9 TYPE 2 DIABETES MELLITUS WITHOUT COMPLICATIONS (MULTI): ICD-10-CM

## 2025-01-30 RX ORDER — BLOOD-GLUCOSE METER
EACH MISCELLANEOUS
Qty: 100 STRIP | Refills: 3 | Status: SHIPPED | OUTPATIENT
Start: 2025-01-30

## 2025-02-04 ENCOUNTER — APPOINTMENT (OUTPATIENT)
Dept: PRIMARY CARE | Facility: CLINIC | Age: 80
End: 2025-02-04
Payer: MEDICARE

## 2025-02-04 VITALS
DIASTOLIC BLOOD PRESSURE: 68 MMHG | HEART RATE: 81 BPM | OXYGEN SATURATION: 97 % | BODY MASS INDEX: 27.3 KG/M2 | HEIGHT: 71 IN | SYSTOLIC BLOOD PRESSURE: 132 MMHG | WEIGHT: 195 LBS

## 2025-02-04 DIAGNOSIS — R35.1 BENIGN PROSTATIC HYPERPLASIA WITH NOCTURIA: ICD-10-CM

## 2025-02-04 DIAGNOSIS — Z00.00 ROUTINE GENERAL MEDICAL EXAMINATION AT HEALTH CARE FACILITY: Primary | ICD-10-CM

## 2025-02-04 DIAGNOSIS — I10 ESSENTIAL (PRIMARY) HYPERTENSION: ICD-10-CM

## 2025-02-04 DIAGNOSIS — E11.9 CONTROLLED TYPE 2 DIABETES MELLITUS WITHOUT COMPLICATION, WITHOUT LONG-TERM CURRENT USE OF INSULIN (MULTI): ICD-10-CM

## 2025-02-04 DIAGNOSIS — N40.1 BENIGN PROSTATIC HYPERPLASIA WITH NOCTURIA: ICD-10-CM

## 2025-02-04 DIAGNOSIS — C43.72 MALIGNANT MELANOMA OF LEFT LOWER LIMB, INCLUDING HIP (MULTI): ICD-10-CM

## 2025-02-04 DIAGNOSIS — E78.2 MIXED HYPERLIPIDEMIA: ICD-10-CM

## 2025-02-04 LAB — POC HEMOGLOBIN A1C: 7.3 % (ref 4.2–6.5)

## 2025-02-04 PROCEDURE — 1160F RVW MEDS BY RX/DR IN RCRD: CPT | Performed by: FAMILY MEDICINE

## 2025-02-04 PROCEDURE — 99397 PER PM REEVAL EST PAT 65+ YR: CPT | Performed by: FAMILY MEDICINE

## 2025-02-04 PROCEDURE — 1157F ADVNC CARE PLAN IN RCRD: CPT | Performed by: FAMILY MEDICINE

## 2025-02-04 PROCEDURE — 3078F DIAST BP <80 MM HG: CPT | Performed by: FAMILY MEDICINE

## 2025-02-04 PROCEDURE — 1170F FXNL STATUS ASSESSED: CPT | Performed by: FAMILY MEDICINE

## 2025-02-04 PROCEDURE — 83036 HEMOGLOBIN GLYCOSYLATED A1C: CPT | Performed by: FAMILY MEDICINE

## 2025-02-04 PROCEDURE — G0439 PPPS, SUBSEQ VISIT: HCPCS | Performed by: FAMILY MEDICINE

## 2025-02-04 PROCEDURE — 99214 OFFICE O/P EST MOD 30 MIN: CPT | Performed by: FAMILY MEDICINE

## 2025-02-04 PROCEDURE — 1036F TOBACCO NON-USER: CPT | Performed by: FAMILY MEDICINE

## 2025-02-04 PROCEDURE — 1123F ACP DISCUSS/DSCN MKR DOCD: CPT | Performed by: FAMILY MEDICINE

## 2025-02-04 PROCEDURE — 3075F SYST BP GE 130 - 139MM HG: CPT | Performed by: FAMILY MEDICINE

## 2025-02-04 PROCEDURE — 1159F MED LIST DOCD IN RCRD: CPT | Performed by: FAMILY MEDICINE

## 2025-02-04 ASSESSMENT — ENCOUNTER SYMPTOMS
WEAKNESS: 0
DIARRHEA: 0
NECK STIFFNESS: 1
DIZZINESS: 0
DEPRESSION: 0
NERVOUS/ANXIOUS: 0
SHORTNESS OF BREATH: 0
FREQUENCY: 0
WHEEZING: 0
BACK PAIN: 0
HEADACHES: 0
FEVER: 0
ARTHRALGIAS: 1
FATIGUE: 0
BRUISES/BLEEDS EASILY: 0
COUGH: 0
TROUBLE SWALLOWING: 0
VOMITING: 0
CHEST TIGHTNESS: 0
LOSS OF SENSATION IN FEET: 0
TREMORS: 0
NUMBNESS: 0
COLOR CHANGE: 0
HEMATURIA: 0
ADENOPATHY: 0
POLYPHAGIA: 0
NECK PAIN: 1
DYSURIA: 0
POLYDIPSIA: 0
CONSTIPATION: 0
NAUSEA: 0
CHILLS: 0
EYE REDNESS: 0
SORE THROAT: 0
BLOOD IN STOOL: 0
ABDOMINAL PAIN: 0
DYSPHORIC MOOD: 0
EYE PAIN: 0
PALPITATIONS: 0
OCCASIONAL FEELINGS OF UNSTEADINESS: 0

## 2025-02-04 ASSESSMENT — ACTIVITIES OF DAILY LIVING (ADL)
TAKING_MEDICATION: INDEPENDENT
BATHING: INDEPENDENT
MANAGING_FINANCES: INDEPENDENT
GROCERY_SHOPPING: INDEPENDENT
DOING_HOUSEWORK: INDEPENDENT
DRESSING: INDEPENDENT

## 2025-02-04 ASSESSMENT — PATIENT HEALTH QUESTIONNAIRE - PHQ9
1. LITTLE INTEREST OR PLEASURE IN DOING THINGS: NOT AT ALL
SUM OF ALL RESPONSES TO PHQ9 QUESTIONS 1 & 2: 0
2. FEELING DOWN, DEPRESSED OR HOPELESS: NOT AT ALL

## 2025-02-04 NOTE — PROGRESS NOTES
Subjective   Reason for Visit: Gary Pineda is an 80 y.o. male here for a Medicare Wellness visit.     Past Medical, Surgical, and Family History reviewed and updated in chart.    Reviewed all medications by prescribing practitioner or clinical pharmacist (such as prescriptions, OTCs, herbal therapies and supplements) and documented in the medical record.    HPI  No SE meds  Legs will feel tired occasionally  No CP, palpitations, SOB, numbness, weakness, dizziness, HA, vision changes     Has not had neck and shoulder pain in awhile     Ophtho- 5/24  Dentist- due to see  [place]- 8/24  Colonoscopy-  NEHEMIAS-  FOBT-  PSA- 8/24  UA/Micro- 12/23  Lung CT-   Coronary Calcium CT Score-  AAA-  EKG-  Pneumovax- 10/20  Prevnar-  Flu- 12/24  Shingrix-  Td-6/23  Hep C-  Advance Directives-  AWV- 2/4/25  ETOH screen- does not drink  MDD screen- 2/4/25  DIANE-     Patient Care Team:  Kale Cazares DO as PCP - General (Family Medicine)  Hermelindo Perez DO as PCP - [employer] Advantage PCP  Faustino Gomes MD as Surgeon (Urology)  Cornelio Brewer MD as Surgeon (Ophthalmology)  Georges Lovett MD as Surgeon (Otolaryngology)  Calli Bauer MD as Consulting Physician (Dermatology)  Gareth Tinajero MD as Consulting Physician (Orthopaedic Surgery)     Review of Systems   Constitutional:  Negative for chills, fatigue and fever.   HENT:  Negative for congestion, ear discharge, ear pain, hearing loss, nosebleeds, sore throat, tinnitus and trouble swallowing.    Eyes:  Negative for pain, redness and visual disturbance.   Respiratory:  Negative for cough, chest tightness, shortness of breath and wheezing.    Cardiovascular:  Negative for chest pain, palpitations and leg swelling.   Gastrointestinal:  Negative for abdominal pain, blood in stool, constipation, diarrhea, nausea and vomiting.   Endocrine: Negative for cold intolerance, heat intolerance, polydipsia, polyphagia and polyuria.   Genitourinary:  Negative for dysuria, frequency,  "hematuria and urgency.   Musculoskeletal:  Positive for arthralgias, neck pain and neck stiffness. Negative for back pain and gait problem.   Skin:  Negative for color change and rash.   Neurological:  Negative for dizziness, tremors, syncope, weakness, numbness and headaches.   Hematological:  Negative for adenopathy. Does not bruise/bleed easily.   Psychiatric/Behavioral:  Negative for dysphoric mood. The patient is not nervous/anxious.        Objective   Vitals:  /68   Pulse 81   Ht 1.803 m (5' 11\")   Wt 88.5 kg (195 lb)   SpO2 97%   BMI 27.20 kg/m²       Physical Exam  Vitals and nursing note reviewed.   Constitutional:       General: He is not in acute distress.     Appearance: Normal appearance.   HENT:      Head: Normocephalic and atraumatic.      Right Ear: Tympanic membrane, ear canal and external ear normal.      Left Ear: Tympanic membrane, ear canal and external ear normal.      Nose: Nose normal.      Mouth/Throat:      Mouth: Mucous membranes are moist.      Pharynx: Oropharynx is clear.   Eyes:      Extraocular Movements: Extraocular movements intact.      Conjunctiva/sclera: Conjunctivae normal.      Pupils: Pupils are equal, round, and reactive to light.   Neck:      Vascular: No carotid bruit.   Cardiovascular:      Rate and Rhythm: Normal rate and regular rhythm.      Pulses: Normal pulses.      Heart sounds: Normal heart sounds. No murmur heard.  Pulmonary:      Effort: Pulmonary effort is normal.      Breath sounds: Normal breath sounds. No wheezing, rhonchi or rales.   Abdominal:      General: Abdomen is flat. Bowel sounds are normal.      Palpations: Abdomen is soft. There is no mass.   Musculoskeletal:         General: Normal range of motion.      Cervical back: Normal range of motion and neck supple.   Lymphadenopathy:      Cervical: No cervical adenopathy.   Skin:     Capillary Refill: Capillary refill takes less than 2 seconds.   Neurological:      General: No focal deficit " present.      Mental Status: He is alert and oriented to person, place, and time.      Cranial Nerves: No cranial nerve deficit.      Motor: No weakness.      Deep Tendon Reflexes: Reflexes normal.   Psychiatric:         Mood and Affect: Mood normal.         Behavior: Behavior normal.         Assessment & Plan  Routine general medical examination at health care facility    Orders:    1 Year Follow Up In Primary Care - Wellness Exam; Future    Controlled type 2 diabetes mellitus without complication, without long-term current use of insulin (Multi)    Orders:    POCT glycosylated hemoglobin (Hb A1C) manually resulted    Malignant melanoma of left lower limb, including hip (Multi)         Essential (primary) hypertension         Mixed hyperlipidemia         Benign prostatic hyperplasia with nocturia              Renewed/continued rest of medications  Checked  labs  Updated Health Maintenance in HPI section  HTN, controlled- continue meds, low sodium diet     Overweight- caloric restriction, increase CV exercise     Hyperlipidemia- continue meds, low fat/cholesterol diet     BPH- limit caffeine, avoid fluid before bed     History of Melanoma- follow with dermatology/oncology     DM, type 2- avoid sugars, low carbs, increase CV exercise, continue meds, check feet daily

## 2025-02-05 LAB
ALBUMIN SERPL-MCNC: 3.9 G/DL (ref 3.6–5.1)
ALP SERPL-CCNC: 89 U/L (ref 35–144)
ALT SERPL-CCNC: 18 U/L (ref 9–46)
ANION GAP SERPL CALCULATED.4IONS-SCNC: 8 MMOL/L (CALC) (ref 7–17)
AST SERPL-CCNC: 15 U/L (ref 10–35)
BILIRUB SERPL-MCNC: 1.5 MG/DL (ref 0.2–1.2)
BUN SERPL-MCNC: 17 MG/DL (ref 7–25)
CALCIUM SERPL-MCNC: 9.9 MG/DL (ref 8.6–10.3)
CHLORIDE SERPL-SCNC: 101 MMOL/L (ref 98–110)
CHOLEST SERPL-MCNC: 118 MG/DL
CHOLEST/HDLC SERPL: 3.6 (CALC)
CO2 SERPL-SCNC: 26 MMOL/L (ref 20–32)
CREAT SERPL-MCNC: 0.89 MG/DL (ref 0.7–1.22)
EGFRCR SERPLBLD CKD-EPI 2021: 86 ML/MIN/1.73M2
GLUCOSE SERPL-MCNC: 155 MG/DL (ref 65–99)
HDLC SERPL-MCNC: 33 MG/DL
LDLC SERPL CALC-MCNC: 62 MG/DL (CALC)
NONHDLC SERPL-MCNC: 85 MG/DL (CALC)
POTASSIUM SERPL-SCNC: 5.2 MMOL/L (ref 3.5–5.3)
PROT SERPL-MCNC: 6.9 G/DL (ref 6.1–8.1)
SODIUM SERPL-SCNC: 135 MMOL/L (ref 135–146)
TRIGL SERPL-MCNC: 142 MG/DL

## 2025-02-11 DIAGNOSIS — E11.9 CONTROLLED TYPE 2 DIABETES MELLITUS WITHOUT COMPLICATION, WITHOUT LONG-TERM CURRENT USE OF INSULIN (MULTI): Primary | ICD-10-CM

## 2025-02-11 RX ORDER — EMPAGLIFLOZIN AND LINAGLIPTIN 25; 5 MG/1; MG/1
1 TABLET, FILM COATED ORAL DAILY
Qty: 90 TABLET | Refills: 3 | Status: SHIPPED | OUTPATIENT
Start: 2025-02-11 | End: 2026-02-11

## 2025-02-17 ENCOUNTER — TELEPHONE (OUTPATIENT)
Dept: PRIMARY CARE | Facility: CLINIC | Age: 80
End: 2025-02-17
Payer: MEDICARE

## 2025-03-04 DIAGNOSIS — I10 ESSENTIAL (PRIMARY) HYPERTENSION: ICD-10-CM

## 2025-03-04 RX ORDER — SPIRONOLACTONE 25 MG/1
25 TABLET ORAL DAILY
Qty: 90 TABLET | Refills: 1 | Status: SHIPPED | OUTPATIENT
Start: 2025-03-04

## 2025-04-15 ENCOUNTER — HOSPITAL ENCOUNTER (OUTPATIENT)
Dept: RADIOLOGY | Facility: CLINIC | Age: 80
Discharge: HOME | End: 2025-04-15
Payer: MEDICARE

## 2025-04-15 ENCOUNTER — OFFICE VISIT (OUTPATIENT)
Dept: ORTHOPEDIC SURGERY | Facility: CLINIC | Age: 80
End: 2025-04-15
Payer: MEDICARE

## 2025-04-15 VITALS — BODY MASS INDEX: 27.2 KG/M2 | WEIGHT: 195 LBS

## 2025-04-15 DIAGNOSIS — Z01.818 PREOP EXAMINATION: ICD-10-CM

## 2025-04-15 DIAGNOSIS — M17.12 ARTHRITIS OF LEFT KNEE: ICD-10-CM

## 2025-04-15 DIAGNOSIS — M25.562 LEFT KNEE PAIN, UNSPECIFIED CHRONICITY: Primary | ICD-10-CM

## 2025-04-15 DIAGNOSIS — M25.562 LEFT KNEE PAIN, UNSPECIFIED CHRONICITY: ICD-10-CM

## 2025-04-15 PROCEDURE — 99204 OFFICE O/P NEW MOD 45 MIN: CPT | Performed by: ORTHOPAEDIC SURGERY

## 2025-04-15 PROCEDURE — 73562 X-RAY EXAM OF KNEE 3: CPT | Mod: LEFT SIDE | Performed by: RADIOLOGY

## 2025-04-15 PROCEDURE — 1036F TOBACCO NON-USER: CPT | Performed by: ORTHOPAEDIC SURGERY

## 2025-04-15 PROCEDURE — 99214 OFFICE O/P EST MOD 30 MIN: CPT | Mod: 57 | Performed by: ORTHOPAEDIC SURGERY

## 2025-04-15 PROCEDURE — 1159F MED LIST DOCD IN RCRD: CPT | Performed by: ORTHOPAEDIC SURGERY

## 2025-04-15 PROCEDURE — 1160F RVW MEDS BY RX/DR IN RCRD: CPT | Performed by: ORTHOPAEDIC SURGERY

## 2025-04-15 PROCEDURE — 1123F ACP DISCUSS/DSCN MKR DOCD: CPT | Performed by: ORTHOPAEDIC SURGERY

## 2025-04-15 PROCEDURE — 1157F ADVNC CARE PLAN IN RCRD: CPT | Performed by: ORTHOPAEDIC SURGERY

## 2025-04-15 PROCEDURE — 73562 X-RAY EXAM OF KNEE 3: CPT | Mod: LT

## 2025-04-15 RX ORDER — MELOXICAM 7.5 MG/1
7.5 TABLET ORAL ONCE
OUTPATIENT
Start: 2025-04-15 | End: 2025-04-15

## 2025-04-15 RX ORDER — SCOPOLAMINE 1 MG/3D
1 PATCH, EXTENDED RELEASE TRANSDERMAL
OUTPATIENT
Start: 2025-04-15 | End: 2025-04-18

## 2025-04-15 RX ORDER — ACETAMINOPHEN 325 MG/1
975 TABLET ORAL ONCE
OUTPATIENT
Start: 2025-04-15 | End: 2025-04-15

## 2025-04-15 RX ORDER — PREGABALIN 25 MG/1
75 CAPSULE ORAL ONCE
OUTPATIENT
Start: 2025-04-15 | End: 2025-04-15

## 2025-04-15 NOTE — PROGRESS NOTES
Chief Complaint   Chief Complaint   Patient presents with    Left Knee - Pain         HPI:      Gary Pineda is a pleasant 80 y.o. year-old male who is seen today for progressive pain and disability from left knee arthritis she underwent a right total knee arthroplasty about 4 years ago years ago has done well now has pain at rest pain with activity unable to walk more than 1 block without resting because of his left knee discomfort wants to consider knee replacement surgery    Review of Systems all other body systems have been reviewed and are negative for complaint.    There were no vitals filed for this visit.    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    Other conditions influencing health status 06/08/2015    History of cough    Other conditions influencing health status 03/12/2019    Diabetes mellitus type II, uncontrolled    Pain, unspecified 08/09/2016    Acute pain    Personal history of diseases of the skin and subcutaneous tissue 12/04/2014    History of seborrheic keratosis    Personal history of other diseases of male genital organs     History of benign prostatic hypertrophy    Personal history of other diseases of the musculoskeletal system and connective tissue     History of arthritis    Prediabetes 12/09/2015    Pre-diabetes    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    Unspecified cataract     Cataract of  both eyes    Unspecified injury of unspecified lower leg, initial encounter     Knee injury    Urinary retention 2023     Patient Active Problem List   Diagnosis    Agatston coronary artery calcium score greater than 400    Essential (primary) hypertension    Controlled type 2 diabetes mellitus without complication, without long-term current use of insulin    Mixed hyperlipidemia    Primary osteoarthritis of left knee    Vasomotor rhinitis    Presence of right artificial knee joint    Benign prostatic hyperplasia with lower urinary tract symptoms    Long term (current) use of oral hypoglycemic drugs    Allergic contact dermatitis, unspecified cause    Eczema    Male erectile disorder    Malignant melanoma of left lower limb, including hip    Actinic keratosis    Personal history of malignant melanoma of skin    Personal history of other malignant neoplasm of skin    Squamous cell carcinoma of skin of left upper limb, including shoulder    Tinea cruris       Medication Documentation Review Audit       Reviewed by Jose Burnham MA (Medical Assistant) on 04/15/25 at 1306      Medication Order Taking? Sig Documenting Provider Last Dose Status   acetaminophen (ARTHRITIS PAIN RELIEF, ACETAM, ORAL) [id number] No Take 2 capsules by mouth once daily. Historical Provider, MD Taking Active   aspirin 81 mg EC tablet [id number]  Take 1 tablet (81 mg) by mouth once daily. Historical Provider, MD  Active   atorvastatin (Lipitor) 80 mg tablet [id number]  TAKE 1 TABLET (80 MG) BY MOUTH ONCE DAILY IN THE EVENING. Kale Cazares DO  Active   azelastine (Astelin) 137 mcg (0.1 %) nasal spray [id number] No Administer 2 sprays into each nostril 2 times a day. Hermelindo Perez DO Taking  25 2359   blood sugar diagnostic (OneTouch Verio test strips) strip [id number]  FOR TESTING ONE A DAY DX E 11.9 Kale Cazares DO  Active   cholecalciferol (Vitamin D-3) 25 MCG (1000 UT) capsule [id number] No Take by mouth. Historical  MD Vanessa Taking Active   empagliflozin (Jardiance) 25 mg [id number]  Take 1 tablet (25 mg) by mouth once daily. Kale Cazares DO  Active   empagliflozin-linagliptin (Glyxambi) 25-5 mg [id number]  Take 1 tablet by mouth once daily. Kale Cazares DO  Active   Januvia 100 mg tablet [id number]  TAKE 1 TABLET BY MOUTH EVERY DAY Kale Cazares DO  Active   losartan (Cozaar) 25 mg tablet [id number]  TAKE 1 TABLET BY MOUTH EVERY DAY Kale Cazares DO  Active   metoprolol succinate XL (Toprol-XL) 100 mg 24 hr tablet [id number]  TAKE 1 TABLET (100 MG) BY MOUTH ONCE DAILY. DO NOT CRUSH OR CHEW. Kale Cazares DO   25 2359   spironolactone (Aldactone) 25 mg tablet [id number]  TAKE 1 TABLET BY MOUTH EVERY DAY Kale Cazares DO  Active   triamcinolone (Kenalog) 0.1 % cream [id number]  Apply topically 2 times a day. Kale Cazares DO  Active                    Allergies   Allergen Reactions    Lisinopril Cough       Social History     Socioeconomic History    Marital status:      Spouse name: Not on file    Number of children: Not on file    Years of education: Not on file    Highest education level: Not on file   Occupational History    Not on file   Tobacco Use    Smoking status: Never    Smokeless tobacco: Never   Substance and Sexual Activity    Alcohol use: Never    Drug use: Never    Sexual activity: Not on file   Other Topics Concern    Not on file   Social History Narrative    Not on file     Social Drivers of Health     Financial Resource Strain: Not on file   Food Insecurity: Not on file   Transportation Needs: Not on file   Physical Activity: Not on file   Stress: Not on file   Social Connections: Not on file   Intimate Partner Violence: Not on file   Housing Stability: Not on file       Past Surgical History:   Procedure Laterality Date    CATARACT EXTRACTION  2015    Cataract Surgery    COLONOSCOPY  2013    Complete Colonoscopy    HERNIA REPAIR  2013     Inguinal Hernia Repair    KNEE ARTHROSCOPY W/ DEBRIDEMENT  09/09/2013    Knee Arthroscopy (Therapeutic)    OTHER SURGICAL HISTORY  10/09/2020    Knee replacement    ROTATOR CUFF REPAIR  09/09/2013    Rotator Cuff Repair       Body mass index is 27.2 kg/m².    HgA1c:   Lab Results   Component Value Date    HGBA1C 7.3 (A) 02/04/2025    [id number] 154 08/20/2020       Physical Exam:  Constitutional: Well-developed well-nourished   Eyes: Sclerae anicteric, pupils equal and round  HENT: Normocephalic atraumatic  Cardiovascular: Pulses full, regular rate and rhythm  Respiratory: Breathing not labored, no wheezing  Integumentary: Skin intact, no lesions or rashes  Neurological: Sensation intact, no gross strength deficits, reflexes equal  Psychiatric: Alert oriented and appropriate  Hematologic/lymphatic: No lymphadenopathy  Left knee: Varus deformity max tender medially small effusion generalized joint line tenderness full flexion no gross instability he has a palpable dorsalis pedis pulse on the left side          Imaging:  X-rays show KL stage IV arthritis of the left knee and vascular calcifications        Impression/Plan:  End-stage knee arthritis this patient has failed conservative therapy for knee arthritis.  This has included activity modification, attempts at weight loss, antiinflammatory medication, injectables, and physical therapy.  Pain is severely affecting activities of daily living and present at rest as well.    I have recommended proceeding with total knee replacement.  We discussed the potential risks including but not limited to persistent pain, DVT, infection, stiffness, periprosthetic fracture, and loosening.  Additionally we have discussed potential prolonged rehabilitation of several months.  We have also discussed preoperative medical screening and joint class.  The plan is for the procedure to be done as an outpatient.  The patient has been instructed that they need to make appropriate  preparations for assistance at home in the immediate postoperative period

## 2025-04-16 ENCOUNTER — HOSPITAL ENCOUNTER (OUTPATIENT)
Facility: HOSPITAL | Age: 80
Setting detail: OUTPATIENT SURGERY
End: 2025-04-16
Attending: ORTHOPAEDIC SURGERY | Admitting: ORTHOPAEDIC SURGERY
Payer: MEDICARE

## 2025-04-16 ENCOUNTER — TELEPHONE (OUTPATIENT)
Dept: ORTHOPEDIC SURGERY | Facility: CLINIC | Age: 80
End: 2025-04-16
Payer: MEDICARE

## 2025-04-16 DIAGNOSIS — M17.12 ARTHRITIS OF LEFT KNEE: Primary | ICD-10-CM

## 2025-04-24 ENCOUNTER — EDUCATION (OUTPATIENT)
Dept: ORTHOPEDIC SURGERY | Facility: CLINIC | Age: 80
End: 2025-04-24
Payer: MEDICARE

## 2025-04-24 NOTE — GROUP NOTE
In addition to the group class activities, Juan Pineda had the following lab work completed:  No orders of the defined types were placed in this encounter.    Gary Pineda  attended joint class on 4/24 and Did not bring a care partner to the class. The preop survey was completed and the patient provided attestation that they understand the content reviewed and that they do have a care partner identified to assist with recovery. Patient was provided with a folder of materials and instructed to call orthopedic navigator with questions.   A new History and Physical was not completed.    This class lasted approximately 2 hours and had 7 participants.   Thank you for attending our Joint Replacement class today in preparation for your upcoming surgery.  Topics discussed include:    MyChart Enrollment  Communication with Care Team  My Chart is the best form of communication to reach all of your caregivers  You can send messages to specific care givers, or a care team  Continued Education  You will be enrolled in a Total Joint Replacement care plan to receive additional education before and after surgery  You can review a short recording of the class content  Access to Medical Records  You can access test results, office notes, appointments, etc.  You can connect to other healthcare systems who use [employer] ([facility], [facility], [facility], etc.)  [id number]  Program Information  Using Meds to Beds is our standard process for joint replacements at [facility] to minimize issues with homegoing medications. Please let us know ahead of time if there is a reason why Meds to Beds cannot be used    Background/Understanding of Joint Replacement Surgery  Potential for same day discharge  Any questions or concerns about your specific surgery/plan are to be directed to the surgeon's office    How to Prepare for Surgery  Use of Nicotine Products/Smoking  Stop several weeks before surgery  Such products slow down the healing process and increase  risk of post-op infection and complications  Clearance for Surgery  Medical Clearance by Specialists  Dental Clearance  Cracked/Broken/Loose teeth left untreated may postpone surgery  The importance of post-op antibiotics for dental visits per surgeon protocol  Preadmission Testing  **Potential for postponed surgery if appropriate clearance is not obtained  Medication Instruction  Follow instructions provided by the doctor who prescribes your medication (typically, but not limited to cardiologist)  Preadmission testing will provide additional instructions during your appointment on what to stop and what to take as you get closer to surgery  For clarification of these instructions, please call preadmission testing directly - [phone]  Tips for Preparing the home for discharge from the hospital  Care Partner  Requirement for surgery, the patient must have a plan to have help at home  Potential for postponed surgery if plan for home support cannot be established  How the care partner can help after surgery  CHG Body Wash/Mouth Wash  Follow the instructions given at preadmission testing  Body wash is to be used on the body and hair for 5 washes  Mouthwash is to be used the night before and morning of surgery  **This is a system-wide protocol developed by infectious disease professionals, we will not alter our recommendations for those with sensitive skin or those who have special hair needs.  Please follow the instructions as they are written as this will provide the best infection prevention measures for surgery.  Should you have an allergy to one of the products, please discuss with your preadmission team**    What to Expect in the Hospital/At Home  Morning of Surgery NPO Guidelines  Nothing to eat after midnight  Water can be consumed up to 2 hours prior to arrival  Surgical and Post-Surgical Care Team  Surgical Team  Anesthesia Team  Nursing  Physical Therapy  Care Coordinating  Pharmacy  Hospital Arrival  Instructions  Arrive at the time provided to you  Consider traffic patterns (rush-hour) based on arrival time  Have arrangements made for a ride home  If discharging same day, care partner should remain at the hospital  Recovering after Surgery  Recovery Room - Visitors are not brought back  Transition to hospital room - 2nd Floor, Visitors will be directed to your room  The presence of and strategies for controlling surgical pain and swelling  The importance of early mobility  Side effects after surgery  What to expect if staying overnight    Discharge Planning  The intended plan for discharge will be for patients to discharge home  All patients require a care partner (family, friend, neighbor, etc.) to stay with the patient for the first few nights after surgery  The inability to secure help at home will postpone surgery  Home Care Services set up per surgeon order  Physical Therapy  Occupational Therapy  **If desired, private duty care can be arranged by the patient ahead of time**  Outpatient Physical Therapy per surgeon order    Recovering at Home  Wound Care  Follow wound care instructions found in your discharge paperwork  Bandage is water-resistant and you may shower with the bandage  Do not scrub directly over the bandage  Do not submerge in water until cleared (bathtub, hot tub, pool, etc.)    Post-Op Risk Prevention  Infection Prevention  Promptly seek treatment for any infections post-operatively  Routine dental visits must be postponed for 3 months after surgery  Your surgeon may require antibiotics prior to future dental visits  Any concerns for infection not related directly to the knee or the hip should be managed by your primary care provider  Blood Clots  Be sure to complete the course of blood thinning medication as prescribed by your surgeon  Movement every 1-2 hours during the day is encouraged to prevent blood clots  Monitor for signs of blood clots  Wear compression stockings as prescribed by  your surgeon  Constipation  Constipation is common following surgery  Drink plenty of fluids  Take stool softener/laxative as prescribed by your surgeon  Move around frequently  Eat foods high in fiber  Fall Prevention  Prepare home ahead of time to clear space to move with walker  Remove throw rugs and electrical cords from walkways  Install railings near any stairways with more than 2 steps  Use night lights for increased visibility at night  Continue to use your assistive device until cleared by surgeon or physical therapy  Dislocation Prevention - Not all procedures will have dislocation precautions  Follow dislocation precautions provided by your surgeon  It is OK to resume sexual activity about 6 weeks following surgery  Be sure to follow any dislocation precautions assigned    Durable Medical Equipment  Cold Therapy  Breg Cold Therapy Machines  Ice/Gel Packs  Assistive Devices  Folding Walker with Wheels (in the front only)  No Rollators  Crutches if approved by Physical Therapy and Surgeon after surgery  Hip Kits  Raised Toilet Seats  Additional Compression Stockings    Joint Preservation  Healthy Activities when Cleared  Walking  Swimming  Bike Riding  Activities to Avoid  Refrain from repetitive motions which have a high impact on the joint  Gradual Progression  Progress activity slowly, listen to your body  Common Findings - NORMAL after surgery  Clicking/Grinding  Numbness near incision    Physical Therapy  Prehabilitation exercises  START TODAY ON BOTH LEGS  Surgery Specific Precautions  Follow surgery specific precautions found in your discharge paperwork    Follow-Up Visit  All patients will see their surgeon for a follow up visit after surgery  The visit may range from 2-6 weeks after surgery and is surgeon specific      Please don't hesitate to reach out if you have any additional questions or concerns.    Polo Alvarado BSN, RN  Brigette Gorman BSN, RN-BC  Orthopedic Nurses  [facility]    [phone]

## 2025-04-30 DIAGNOSIS — I10 ESSENTIAL (PRIMARY) HYPERTENSION: ICD-10-CM

## 2025-04-30 RX ORDER — METOPROLOL SUCCINATE 100 MG/1
100 TABLET, EXTENDED RELEASE ORAL DAILY
Qty: 90 TABLET | Refills: 1 | Status: SHIPPED | OUTPATIENT
Start: 2025-04-30 | End: 2025-10-27

## 2025-05-01 ENCOUNTER — TELEPHONE (OUTPATIENT)
Dept: PREADMISSION TESTING | Facility: HOSPITAL | Age: 80
End: 2025-05-01
Payer: MEDICARE

## 2025-05-03 ENCOUNTER — HOSPITAL ENCOUNTER (EMERGENCY)
Facility: HOSPITAL | Age: 80
Discharge: HOME | DRG: 696 | End: 2025-05-03
Attending: EMERGENCY MEDICINE
Payer: MEDICARE

## 2025-05-03 ENCOUNTER — APPOINTMENT (OUTPATIENT)
Dept: RADIOLOGY | Facility: HOSPITAL | Age: 80
DRG: 696 | End: 2025-05-03
Payer: MEDICARE

## 2025-05-03 VITALS
HEIGHT: 71 IN | DIASTOLIC BLOOD PRESSURE: 73 MMHG | OXYGEN SATURATION: 96 % | RESPIRATION RATE: 18 BRPM | HEART RATE: 51 BPM | TEMPERATURE: 97.7 F | WEIGHT: 200 LBS | SYSTOLIC BLOOD PRESSURE: 119 MMHG | BODY MASS INDEX: 28 KG/M2

## 2025-05-03 DIAGNOSIS — I10 DIASTOLIC HYPERTENSION: ICD-10-CM

## 2025-05-03 DIAGNOSIS — K52.9 JEJUNITIS: ICD-10-CM

## 2025-05-03 DIAGNOSIS — R31.9 PAINLESS HEMATURIA: Primary | ICD-10-CM

## 2025-05-03 LAB
ALBUMIN SERPL BCP-MCNC: 3.7 G/DL (ref 3.4–5)
ALP SERPL-CCNC: 98 U/L (ref 33–136)
ALT SERPL W P-5'-P-CCNC: 19 U/L (ref 10–52)
ANION GAP SERPL CALCULATED.3IONS-SCNC: 13 MMOL/L (ref 10–20)
APPEARANCE UR: ABNORMAL
AST SERPL W P-5'-P-CCNC: 14 U/L (ref 9–39)
BASOPHILS # BLD AUTO: 0.06 X10*3/UL (ref 0–0.1)
BASOPHILS NFR BLD AUTO: 0.7 %
BILIRUB SERPL-MCNC: 1.2 MG/DL (ref 0–1.2)
BILIRUB UR STRIP.AUTO-MCNC: ABNORMAL MG/DL
BUN SERPL-MCNC: 20 MG/DL (ref 6–23)
CALCIUM SERPL-MCNC: 9.1 MG/DL (ref 8.6–10.3)
CHLORIDE SERPL-SCNC: 103 MMOL/L (ref 98–107)
CO2 SERPL-SCNC: 23 MMOL/L (ref 21–32)
COLOR UR: ABNORMAL
CREAT SERPL-MCNC: 0.94 MG/DL (ref 0.5–1.3)
EGFRCR SERPLBLD CKD-EPI 2021: 81 ML/MIN/1.73M*2
EOSINOPHIL # BLD AUTO: 0.06 X10*3/UL (ref 0–0.4)
EOSINOPHIL NFR BLD AUTO: 0.7 %
ERYTHROCYTE [DISTWIDTH] IN BLOOD BY AUTOMATED COUNT: 13.8 % (ref 11.5–14.5)
GLUCOSE SERPL-MCNC: 232 MG/DL (ref 74–99)
GLUCOSE UR STRIP.AUTO-MCNC: ABNORMAL MG/DL
HCT VFR BLD AUTO: 44.4 % (ref 41–52)
HGB BLD-MCNC: 15 G/DL (ref 13.5–17.5)
HOLD SPECIMEN: NORMAL
IMM GRANULOCYTES # BLD AUTO: 0.04 X10*3/UL (ref 0–0.5)
IMM GRANULOCYTES NFR BLD AUTO: 0.5 % (ref 0–0.9)
KETONES UR STRIP.AUTO-MCNC: ABNORMAL MG/DL
LEUKOCYTE ESTERASE UR QL STRIP.AUTO: ABNORMAL
LYMPHOCYTES # BLD AUTO: 1.85 X10*3/UL (ref 0.8–3)
LYMPHOCYTES NFR BLD AUTO: 21.1 %
MCH RBC QN AUTO: 31.5 PG (ref 26–34)
MCHC RBC AUTO-ENTMCNC: 33.8 G/DL (ref 32–36)
MCV RBC AUTO: 93 FL (ref 80–100)
MONOCYTES # BLD AUTO: 0.56 X10*3/UL (ref 0.05–0.8)
MONOCYTES NFR BLD AUTO: 6.4 %
NEUTROPHILS # BLD AUTO: 6.19 X10*3/UL (ref 1.6–5.5)
NEUTROPHILS NFR BLD AUTO: 70.6 %
NITRITE UR QL STRIP.AUTO: ABNORMAL
NRBC BLD-RTO: 0 /100 WBCS (ref 0–0)
PH UR STRIP.AUTO: ABNORMAL [PH]
PLATELET # BLD AUTO: 329 X10*3/UL (ref 150–450)
POTASSIUM SERPL-SCNC: 4 MMOL/L (ref 3.5–5.3)
PROT SERPL-MCNC: 7 G/DL (ref 6.4–8.2)
PROT UR STRIP.AUTO-MCNC: ABNORMAL MG/DL
RBC # BLD AUTO: 4.76 X10*6/UL (ref 4.5–5.9)
RBC # UR STRIP.AUTO: ABNORMAL MG/DL
SODIUM SERPL-SCNC: 135 MMOL/L (ref 136–145)
SP GR UR STRIP.AUTO: ABNORMAL
UROBILINOGEN UR STRIP.AUTO-MCNC: ABNORMAL MG/DL
WBC # BLD AUTO: 8.8 X10*3/UL (ref 4.4–11.3)

## 2025-05-03 PROCEDURE — 99284 EMERGENCY DEPT VISIT MOD MDM: CPT | Mod: 25 | Performed by: EMERGENCY MEDICINE

## 2025-05-03 PROCEDURE — 51798 US URINE CAPACITY MEASURE: CPT

## 2025-05-03 PROCEDURE — 74176 CT ABD & PELVIS W/O CONTRAST: CPT | Performed by: RADIOLOGY

## 2025-05-03 PROCEDURE — 84075 ASSAY ALKALINE PHOSPHATASE: CPT | Performed by: EMERGENCY MEDICINE

## 2025-05-03 PROCEDURE — 85025 COMPLETE CBC W/AUTO DIFF WBC: CPT | Performed by: EMERGENCY MEDICINE

## 2025-05-03 PROCEDURE — 2500000004 HC RX 250 GENERAL PHARMACY W/ HCPCS (ALT 636 FOR OP/ED): Mod: JZ | Performed by: EMERGENCY MEDICINE

## 2025-05-03 PROCEDURE — 74176 CT ABD & PELVIS W/O CONTRAST: CPT

## 2025-05-03 PROCEDURE — 36415 COLL VENOUS BLD VENIPUNCTURE: CPT | Performed by: EMERGENCY MEDICINE

## 2025-05-03 PROCEDURE — 81003 URINALYSIS AUTO W/O SCOPE: CPT | Performed by: EMERGENCY MEDICINE

## 2025-05-03 PROCEDURE — 96360 HYDRATION IV INFUSION INIT: CPT

## 2025-05-03 RX ORDER — AMOXICILLIN AND CLAVULANATE POTASSIUM 500; 125 MG/1; MG/1
1 TABLET, FILM COATED ORAL 2 TIMES DAILY
Qty: 20 TABLET | Refills: 0 | Status: SHIPPED | OUTPATIENT
Start: 2025-05-03 | End: 2025-05-13

## 2025-05-03 RX ADMIN — SODIUM CHLORIDE 500 ML: 900 INJECTION, SOLUTION INTRAVENOUS at 08:10

## 2025-05-03 ASSESSMENT — LIFESTYLE VARIABLES
TOTAL SCORE: 0
EVER HAD A DRINK FIRST THING IN THE MORNING TO STEADY YOUR NERVES TO GET RID OF A HANGOVER: NO
HAVE PEOPLE ANNOYED YOU BY CRITICIZING YOUR DRINKING: NO
EVER FELT BAD OR GUILTY ABOUT YOUR DRINKING: NO
HAVE YOU EVER FELT YOU SHOULD CUT DOWN ON YOUR DRINKING: NO

## 2025-05-03 ASSESSMENT — COLUMBIA-SUICIDE SEVERITY RATING SCALE - C-SSRS
6. HAVE YOU EVER DONE ANYTHING, STARTED TO DO ANYTHING, OR PREPARED TO DO ANYTHING TO END YOUR LIFE?: NO
2. HAVE YOU ACTUALLY HAD ANY THOUGHTS OF KILLING YOURSELF?: NO
1. IN THE PAST MONTH, HAVE YOU WISHED YOU WERE DEAD OR WISHED YOU COULD GO TO SLEEP AND NOT WAKE UP?: NO

## 2025-05-03 ASSESSMENT — PAIN - FUNCTIONAL ASSESSMENT: PAIN_FUNCTIONAL_ASSESSMENT: 0-10

## 2025-05-03 ASSESSMENT — PAIN SCALES - GENERAL: PAINLEVEL_OUTOF10: 0 - NO PAIN

## 2025-05-03 NOTE — DISCHARGE INSTRUCTIONS
Follow-up with your primary care physician within 1 to 2 days for further management of your current symptoms and repeat check of your blood pressure.    Follow-up with urology within 1 week for further management of your hematuria    Follow-up with gastroenterology within 1 week    Return to the emergency department sooner with worsening of symptoms or onset of new symptoms

## 2025-05-03 NOTE — ED PROVIDER NOTES
Emergency Department Provider Note       History of Present Illness     History provided by: Patient  Limitations to History: None  External Records Reviewed with Brief Summary: None    HPI:      Physical Exam   Triage vitals:  T 36.5 °C (97.7 °F)  HR 72  BP (!) 149/92  RR 16  O2 99 % None (Room air)          Medical Decision Making & ED Course   Medical Decision Making:    The patient is an 80-year-old male presenting to the emergency department for evaluation of hematuria.  The patient states that he started having blood in his urine last night.  He states he has not had any symptoms like this in the past.  He does take daily baby aspirin but no other blood thinners.  No recent injury or trauma.  He denies any headache or visual changes.  No chest pain or shortness of breath.  No abdominal pain.  No nausea or vomiting.  No diarrhea or constipation.  No other urinary complaints.  No urinary retention.  He states he does have a history of prostatic hypertrophy and has had a laser surgery for this 4 to 5 years ago.  No history of ureteral stones.  All pertinent positives and negatives are recorded above.  All other systems reviewed and otherwise negative.  Vital signs with diastolic hypertension but otherwise within normal limits.  Physical exam with a well-nourished well-developed male in no acute distress.  HEENT exam with dry mucous membranes but otherwise unremarkable.  He has no evidence of airway compromise or respiratory distress.  Abdominal exam is benign.  He does not have any gross motor, neurologic or vascular deficits on exam.  Pulses are equal bilaterally.      IV fluids ordered.      Diagnostic labs with a mild electrolyte imbalance and gross hematuria but otherwise unremarkable      CT abdomen pelvis wo IV contrast   Final Result   Probably unexpected, acute incidental finding: Not only jejunal   diverticulosis (I have circled some of the noninflamed diverticula   with small circles), but there  is also very short segment (probably   just a single jejunal diverticulum involved) acute uncomplicated   jejunal diverticulitis, which I have annotated with larger circles.   All of these images appear in the key image series        No perforation, abscess or free fluid        Acute/subacute blood clot in the urinary bladder lumen of uncertain   exact etiology        No other interval change from CT urogram 25 January 2024        Markedly enlarged prostate indenting the bladder base is unchanged        Still no stone anywhere in the urinary tract        No acute hemorrhage/hematoma in the left collecting system        MACRO:   None        Signed by: Slade Estrada 5/3/2025 8:39 AM   Dictation workstation:   [id number]           The patient does not have any gross motor, neurologic or vascular episodes on exam.  He has a benign abdominal exam and is not complaining of any pain.  He is well-perfused on exam and has no evidence of sepsis.  He does have gross hematuria but no evidence of urinary retention.  No evidence of ureteral stone or other acute process on the CT abdomen pelvis other than an incidental finding of jejunitis.      The patient was released in good condition.  He was given a prescription for Augmentin to treat for the jejunitis.  He was instructed to follow-up with his primary care physician within 1 to 2 days for further management of his current symptoms and repeat check of his blood pressure.  He was also given referral to urology to gastroenterology.  he will return to the emergency department sooner with worsening of symptoms or onset of new symptoms      Impression/diagnosis  Painless hematuria  Diastolic hypertension  Jejunitis      I reviewed the results of the diagnostic labs and diagnostic imaging.  Formal radiology reading was completed by the radiologist    ED Course:  Diagnoses as of 05/03/25 0854   Painless hematuria   Diastolic hypertension   Jejunitis       Disposition   As a result of  the work-up, the patient was discharged home.  he was informed of his diagnosis and instructed to come back with any concerns or worsening of condition.  he and was agreeable to the plan as discussed above.  he was given the opportunity to ask questions.  All of the patient's questions were answered.    Procedures   Procedures        Ericka Kern MD  Emergency Medicine                                                            Ericka Kern MD  05/03/25 0852       Ericka Kern MD  05/03/25 0854

## 2025-05-03 NOTE — ED TRIAGE NOTES
"Ambulated in triage with c/o \"Blood in urine since yesterday with no pain\" per pt. Seen Dr Gomes in the past for prostate issues.   "

## 2025-05-05 ENCOUNTER — HOSPITAL ENCOUNTER (INPATIENT)
Facility: HOSPITAL | Age: 80
LOS: 2 days | Discharge: HOME | DRG: 696 | End: 2025-05-07
Attending: EMERGENCY MEDICINE | Admitting: STUDENT IN AN ORGANIZED HEALTH CARE EDUCATION/TRAINING PROGRAM
Payer: MEDICARE

## 2025-05-05 ENCOUNTER — APPOINTMENT (OUTPATIENT)
Dept: CARDIOLOGY | Facility: HOSPITAL | Age: 80
DRG: 696 | End: 2025-05-05
Payer: MEDICARE

## 2025-05-05 ENCOUNTER — APPOINTMENT (OUTPATIENT)
Dept: RADIOLOGY | Facility: HOSPITAL | Age: 80
DRG: 696 | End: 2025-05-05
Payer: MEDICARE

## 2025-05-05 DIAGNOSIS — R31.0 GROSS HEMATURIA: ICD-10-CM

## 2025-05-05 DIAGNOSIS — D64.9 ANEMIA, UNSPECIFIED TYPE: ICD-10-CM

## 2025-05-05 DIAGNOSIS — R31.0 HEMATURIA, GROSS: Primary | ICD-10-CM

## 2025-05-05 PROBLEM — K57.12 DIVERTICULITIS OF JEJUNUM: Status: ACTIVE | Noted: 2025-05-05

## 2025-05-05 PROBLEM — E11.65 TYPE 2 DIABETES MELLITUS WITH HYPERGLYCEMIA, WITHOUT LONG-TERM CURRENT USE OF INSULIN: Status: ACTIVE | Noted: 2025-05-05

## 2025-05-05 PROBLEM — D62 ACUTE BLOOD LOSS ANEMIA: Status: ACTIVE | Noted: 2025-05-05

## 2025-05-05 PROBLEM — R42 DIZZINESS: Status: ACTIVE | Noted: 2025-05-05

## 2025-05-05 LAB
ALBUMIN SERPL BCP-MCNC: 3.5 G/DL (ref 3.4–5)
ALP SERPL-CCNC: 79 U/L (ref 33–136)
ALT SERPL W P-5'-P-CCNC: 16 U/L (ref 10–52)
ANION GAP SERPL CALCULATED.3IONS-SCNC: 13 MMOL/L (ref 10–20)
AST SERPL W P-5'-P-CCNC: 13 U/L (ref 9–39)
BASOPHILS # BLD AUTO: 0.05 X10*3/UL (ref 0–0.1)
BASOPHILS NFR BLD AUTO: 0.4 %
BILIRUB SERPL-MCNC: 1.2 MG/DL (ref 0–1.2)
BNP SERPL-MCNC: 87 PG/ML (ref 0–99)
BUN SERPL-MCNC: 27 MG/DL (ref 6–23)
CALCIUM SERPL-MCNC: 8.8 MG/DL (ref 8.6–10.3)
CARDIAC TROPONIN I PNL SERPL HS: 8 NG/L (ref 0–20)
CARDIAC TROPONIN I PNL SERPL HS: 8 NG/L (ref 0–20)
CHLORIDE SERPL-SCNC: 104 MMOL/L (ref 98–107)
CO2 SERPL-SCNC: 23 MMOL/L (ref 21–32)
CREAT SERPL-MCNC: 1.09 MG/DL (ref 0.5–1.3)
EGFRCR SERPLBLD CKD-EPI 2021: 68 ML/MIN/1.73M*2
EOSINOPHIL # BLD AUTO: 0.07 X10*3/UL (ref 0–0.4)
EOSINOPHIL NFR BLD AUTO: 0.6 %
ERYTHROCYTE [DISTWIDTH] IN BLOOD BY AUTOMATED COUNT: 13.9 % (ref 11.5–14.5)
GLUCOSE BLD MANUAL STRIP-MCNC: 111 MG/DL (ref 74–99)
GLUCOSE BLD MANUAL STRIP-MCNC: 134 MG/DL (ref 74–99)
GLUCOSE BLD MANUAL STRIP-MCNC: 142 MG/DL (ref 74–99)
GLUCOSE BLD MANUAL STRIP-MCNC: 167 MG/DL (ref 74–99)
GLUCOSE SERPL-MCNC: 236 MG/DL (ref 74–99)
HCT VFR BLD AUTO: 35.2 % (ref 41–52)
HGB BLD-MCNC: 11.6 G/DL (ref 13.5–17.5)
IMM GRANULOCYTES # BLD AUTO: 0.06 X10*3/UL (ref 0–0.5)
IMM GRANULOCYTES NFR BLD AUTO: 0.5 % (ref 0–0.9)
LYMPHOCYTES # BLD AUTO: 1.32 X10*3/UL (ref 0.8–3)
LYMPHOCYTES NFR BLD AUTO: 11 %
MAGNESIUM SERPL-MCNC: 1.92 MG/DL (ref 1.6–2.4)
MCH RBC QN AUTO: 31.3 PG (ref 26–34)
MCHC RBC AUTO-ENTMCNC: 33 G/DL (ref 32–36)
MCV RBC AUTO: 95 FL (ref 80–100)
MONOCYTES # BLD AUTO: 0.52 X10*3/UL (ref 0.05–0.8)
MONOCYTES NFR BLD AUTO: 4.3 %
NEUTROPHILS # BLD AUTO: 10.01 X10*3/UL (ref 1.6–5.5)
NEUTROPHILS NFR BLD AUTO: 83.2 %
NRBC BLD-RTO: 0 /100 WBCS (ref 0–0)
PLATELET # BLD AUTO: 368 X10*3/UL (ref 150–450)
POTASSIUM SERPL-SCNC: 4.3 MMOL/L (ref 3.5–5.3)
PROT SERPL-MCNC: 6.1 G/DL (ref 6.4–8.2)
RBC # BLD AUTO: 3.71 X10*6/UL (ref 4.5–5.9)
SODIUM SERPL-SCNC: 136 MMOL/L (ref 136–145)
WBC # BLD AUTO: 12 X10*3/UL (ref 4.4–11.3)

## 2025-05-05 PROCEDURE — 1210000001 HC SEMI-PRIVATE ROOM DAILY

## 2025-05-05 PROCEDURE — 84484 ASSAY OF TROPONIN QUANT: CPT | Performed by: EMERGENCY MEDICINE

## 2025-05-05 PROCEDURE — 82947 ASSAY GLUCOSE BLOOD QUANT: CPT

## 2025-05-05 PROCEDURE — 36415 COLL VENOUS BLD VENIPUNCTURE: CPT | Performed by: EMERGENCY MEDICINE

## 2025-05-05 PROCEDURE — 71045 X-RAY EXAM CHEST 1 VIEW: CPT

## 2025-05-05 PROCEDURE — 71045 X-RAY EXAM CHEST 1 VIEW: CPT | Performed by: RADIOLOGY

## 2025-05-05 PROCEDURE — 80053 COMPREHEN METABOLIC PANEL: CPT | Performed by: EMERGENCY MEDICINE

## 2025-05-05 PROCEDURE — 83880 ASSAY OF NATRIURETIC PEPTIDE: CPT | Performed by: EMERGENCY MEDICINE

## 2025-05-05 PROCEDURE — 93005 ELECTROCARDIOGRAM TRACING: CPT

## 2025-05-05 PROCEDURE — 85025 COMPLETE CBC W/AUTO DIFF WBC: CPT | Performed by: EMERGENCY MEDICINE

## 2025-05-05 PROCEDURE — 99223 1ST HOSP IP/OBS HIGH 75: CPT | Performed by: STUDENT IN AN ORGANIZED HEALTH CARE EDUCATION/TRAINING PROGRAM

## 2025-05-05 PROCEDURE — 99285 EMERGENCY DEPT VISIT HI MDM: CPT | Performed by: EMERGENCY MEDICINE

## 2025-05-05 PROCEDURE — 83735 ASSAY OF MAGNESIUM: CPT | Performed by: EMERGENCY MEDICINE

## 2025-05-05 PROCEDURE — 2500000004 HC RX 250 GENERAL PHARMACY W/ HCPCS (ALT 636 FOR OP/ED): Mod: JZ | Performed by: STUDENT IN AN ORGANIZED HEALTH CARE EDUCATION/TRAINING PROGRAM

## 2025-05-05 RX ORDER — ONDANSETRON 4 MG/1
4 TABLET, ORALLY DISINTEGRATING ORAL EVERY 8 HOURS PRN
Status: DISCONTINUED | OUTPATIENT
Start: 2025-05-05 | End: 2025-05-07 | Stop reason: HOSPADM

## 2025-05-05 RX ORDER — DEXTROSE 50 % IN WATER (D50W) INTRAVENOUS SYRINGE
12.5
Status: DISCONTINUED | OUTPATIENT
Start: 2025-05-05 | End: 2025-05-07 | Stop reason: HOSPADM

## 2025-05-05 RX ORDER — PANTOPRAZOLE SODIUM 40 MG/10ML
40 INJECTION, POWDER, LYOPHILIZED, FOR SOLUTION INTRAVENOUS
Status: DISCONTINUED | OUTPATIENT
Start: 2025-05-06 | End: 2025-05-07 | Stop reason: HOSPADM

## 2025-05-05 RX ORDER — SODIUM CHLORIDE 9 MG/ML
75 INJECTION, SOLUTION INTRAVENOUS CONTINUOUS
Status: ACTIVE | OUTPATIENT
Start: 2025-05-05 | End: 2025-05-06

## 2025-05-05 RX ORDER — ACETAMINOPHEN 325 MG/1
650 TABLET ORAL EVERY 4 HOURS PRN
Status: DISCONTINUED | OUTPATIENT
Start: 2025-05-05 | End: 2025-05-07 | Stop reason: HOSPADM

## 2025-05-05 RX ORDER — INSULIN LISPRO 100 [IU]/ML
0-10 INJECTION, SOLUTION INTRAVENOUS; SUBCUTANEOUS
Status: DISCONTINUED | OUTPATIENT
Start: 2025-05-05 | End: 2025-05-07 | Stop reason: HOSPADM

## 2025-05-05 RX ORDER — ACETAMINOPHEN 160 MG/5ML
650 SOLUTION ORAL EVERY 4 HOURS PRN
Status: DISCONTINUED | OUTPATIENT
Start: 2025-05-05 | End: 2025-05-07 | Stop reason: HOSPADM

## 2025-05-05 RX ORDER — ACETAMINOPHEN 650 MG/1
650 SUPPOSITORY RECTAL EVERY 4 HOURS PRN
Status: DISCONTINUED | OUTPATIENT
Start: 2025-05-05 | End: 2025-05-07 | Stop reason: HOSPADM

## 2025-05-05 RX ORDER — LOSARTAN POTASSIUM 25 MG/1
25 TABLET ORAL DAILY
Status: DISCONTINUED | OUTPATIENT
Start: 2025-05-06 | End: 2025-05-07 | Stop reason: HOSPADM

## 2025-05-05 RX ORDER — ATORVASTATIN CALCIUM 80 MG/1
80 TABLET, FILM COATED ORAL EVERY EVENING
Status: DISCONTINUED | OUTPATIENT
Start: 2025-05-06 | End: 2025-05-07 | Stop reason: HOSPADM

## 2025-05-05 RX ORDER — POLYETHYLENE GLYCOL 3350 17 G/17G
17 POWDER, FOR SOLUTION ORAL DAILY PRN
Status: DISCONTINUED | OUTPATIENT
Start: 2025-05-05 | End: 2025-05-07 | Stop reason: HOSPADM

## 2025-05-05 RX ORDER — METOPROLOL SUCCINATE 100 MG/1
100 TABLET, EXTENDED RELEASE ORAL DAILY
Status: DISCONTINUED | OUTPATIENT
Start: 2025-05-06 | End: 2025-05-07 | Stop reason: HOSPADM

## 2025-05-05 RX ORDER — ONDANSETRON HYDROCHLORIDE 2 MG/ML
4 INJECTION, SOLUTION INTRAVENOUS EVERY 8 HOURS PRN
Status: DISCONTINUED | OUTPATIENT
Start: 2025-05-05 | End: 2025-05-07 | Stop reason: HOSPADM

## 2025-05-05 RX ORDER — DEXTROSE 50 % IN WATER (D50W) INTRAVENOUS SYRINGE
25
Status: DISCONTINUED | OUTPATIENT
Start: 2025-05-05 | End: 2025-05-07 | Stop reason: HOSPADM

## 2025-05-05 RX ORDER — PANTOPRAZOLE SODIUM 40 MG/1
40 TABLET, DELAYED RELEASE ORAL
Status: DISCONTINUED | OUTPATIENT
Start: 2025-05-06 | End: 2025-05-07 | Stop reason: HOSPADM

## 2025-05-05 RX ORDER — SPIRONOLACTONE 25 MG/1
25 TABLET ORAL DAILY
Status: DISCONTINUED | OUTPATIENT
Start: 2025-05-06 | End: 2025-05-07 | Stop reason: HOSPADM

## 2025-05-05 RX ADMIN — SODIUM CHLORIDE 75 ML/HR: 9 INJECTION, SOLUTION INTRAVENOUS at 15:11

## 2025-05-05 SDOH — SOCIAL STABILITY: SOCIAL NETWORK
DO YOU BELONG TO ANY CLUBS OR ORGANIZATIONS SUCH AS CHURCH GROUPS, UNIONS, FRATERNAL OR ATHLETIC GROUPS, OR SCHOOL GROUPS?: NO

## 2025-05-05 SDOH — SOCIAL STABILITY: SOCIAL INSECURITY: ARE THERE ANY APPARENT SIGNS OF INJURIES/BEHAVIORS THAT COULD BE RELATED TO ABUSE/NEGLECT?: NO

## 2025-05-05 SDOH — SOCIAL STABILITY: SOCIAL INSECURITY: HAVE YOU HAD ANY THOUGHTS OF HARMING ANYONE ELSE?: NO

## 2025-05-05 SDOH — ECONOMIC STABILITY: TRANSPORTATION INSECURITY: IN THE PAST 12 MONTHS, HAS LACK OF TRANSPORTATION KEPT YOU FROM MEDICAL APPOINTMENTS OR FROM GETTING MEDICATIONS?: NO

## 2025-05-05 SDOH — HEALTH STABILITY: PHYSICAL HEALTH
HOW OFTEN DO YOU NEED TO HAVE SOMEONE HELP YOU WHEN YOU READ INSTRUCTIONS, PAMPHLETS, OR OTHER WRITTEN MATERIAL FROM YOUR DOCTOR OR PHARMACY?: RARELY

## 2025-05-05 SDOH — SOCIAL STABILITY: SOCIAL INSECURITY: ARE YOU OR HAVE YOU BEEN THREATENED OR ABUSED PHYSICALLY, EMOTIONALLY, OR SEXUALLY BY ANYONE?: NO

## 2025-05-05 SDOH — HEALTH STABILITY: MENTAL HEALTH: HOW OFTEN DO YOU HAVE A DRINK CONTAINING ALCOHOL?: NEVER

## 2025-05-05 SDOH — SOCIAL STABILITY: SOCIAL INSECURITY: WITHIN THE LAST YEAR, HAVE YOU BEEN HUMILIATED OR EMOTIONALLY ABUSED IN OTHER WAYS BY YOUR PARTNER OR EX-PARTNER?: NO

## 2025-05-05 SDOH — HEALTH STABILITY: PHYSICAL HEALTH: ON AVERAGE, HOW MANY MINUTES DO YOU ENGAGE IN EXERCISE AT THIS LEVEL?: 40 MIN

## 2025-05-05 SDOH — SOCIAL STABILITY: SOCIAL NETWORK: HOW OFTEN DO YOU ATTEND CHURCH OR RELIGIOUS SERVICES?: 1 TO 4 TIMES PER YEAR

## 2025-05-05 SDOH — SOCIAL STABILITY: SOCIAL NETWORK
IN A TYPICAL WEEK, HOW MANY TIMES DO YOU TALK ON THE PHONE WITH FAMILY, FRIENDS, OR NEIGHBORS?: MORE THAN THREE TIMES A WEEK

## 2025-05-05 SDOH — ECONOMIC STABILITY: HOUSING INSECURITY: IN THE LAST 12 MONTHS, WAS THERE A TIME WHEN YOU WERE NOT ABLE TO PAY THE MORTGAGE OR RENT ON TIME?: NO

## 2025-05-05 SDOH — HEALTH STABILITY: MENTAL HEALTH: HOW MANY DRINKS CONTAINING ALCOHOL DO YOU HAVE ON A TYPICAL DAY WHEN YOU ARE DRINKING?: PATIENT DOES NOT DRINK

## 2025-05-05 SDOH — SOCIAL STABILITY: SOCIAL NETWORK: HOW OFTEN DO YOU GET TOGETHER WITH FRIENDS OR RELATIVES?: THREE TIMES A WEEK

## 2025-05-05 SDOH — SOCIAL STABILITY: SOCIAL INSECURITY: HAVE YOU HAD THOUGHTS OF HARMING ANYONE ELSE?: NO

## 2025-05-05 SDOH — ECONOMIC STABILITY: FOOD INSECURITY: WITHIN THE PAST 12 MONTHS, YOU WORRIED THAT YOUR FOOD WOULD RUN OUT BEFORE YOU GOT THE MONEY TO BUY MORE.: NEVER TRUE

## 2025-05-05 SDOH — SOCIAL STABILITY: SOCIAL INSECURITY
WITHIN THE LAST YEAR, HAVE YOU BEEN RAPED OR FORCED TO HAVE ANY KIND OF SEXUAL ACTIVITY BY YOUR PARTNER OR EX-PARTNER?: NO

## 2025-05-05 SDOH — HEALTH STABILITY: MENTAL HEALTH
DO YOU FEEL STRESS - TENSE, RESTLESS, NERVOUS, OR ANXIOUS, OR UNABLE TO SLEEP AT NIGHT BECAUSE YOUR MIND IS TROUBLED ALL THE TIME - THESE DAYS?: NOT AT ALL

## 2025-05-05 SDOH — SOCIAL STABILITY: SOCIAL INSECURITY: WITHIN THE LAST YEAR, HAVE YOU BEEN AFRAID OF YOUR PARTNER OR EX-PARTNER?: NO

## 2025-05-05 SDOH — ECONOMIC STABILITY: FOOD INSECURITY: WITHIN THE PAST 12 MONTHS, THE FOOD YOU BOUGHT JUST DIDN'T LAST AND YOU DIDN'T HAVE MONEY TO GET MORE.: NEVER TRUE

## 2025-05-05 SDOH — SOCIAL STABILITY: SOCIAL INSECURITY: ARE YOU MARRIED, WIDOWED, DIVORCED, SEPARATED, NEVER MARRIED, OR LIVING WITH A PARTNER?: MARRIED

## 2025-05-05 SDOH — ECONOMIC STABILITY: FOOD INSECURITY: HOW HARD IS IT FOR YOU TO PAY FOR THE VERY BASICS LIKE FOOD, HOUSING, MEDICAL CARE, AND HEATING?: NOT HARD AT ALL

## 2025-05-05 SDOH — SOCIAL STABILITY: SOCIAL INSECURITY: DO YOU FEEL UNSAFE GOING BACK TO THE PLACE WHERE YOU ARE LIVING?: NO

## 2025-05-05 SDOH — HEALTH STABILITY: MENTAL HEALTH: HOW OFTEN DO YOU HAVE SIX OR MORE DRINKS ON ONE OCCASION?: NEVER

## 2025-05-05 SDOH — ECONOMIC STABILITY: HOUSING INSECURITY: AT ANY TIME IN THE PAST 12 MONTHS, WERE YOU HOMELESS OR LIVING IN A SHELTER (INCLUDING NOW)?: NO

## 2025-05-05 SDOH — SOCIAL STABILITY: SOCIAL INSECURITY: HAS ANYONE EVER THREATENED TO HURT YOUR FAMILY OR YOUR PETS?: NO

## 2025-05-05 SDOH — ECONOMIC STABILITY: INCOME INSECURITY: IN THE PAST 12 MONTHS HAS THE ELECTRIC, GAS, OIL, OR WATER COMPANY THREATENED TO SHUT OFF SERVICES IN YOUR HOME?: NO

## 2025-05-05 SDOH — SOCIAL STABILITY: SOCIAL INSECURITY: ABUSE: ADULT

## 2025-05-05 SDOH — SOCIAL STABILITY: SOCIAL INSECURITY
WITHIN THE LAST YEAR, HAVE YOU BEEN KICKED, HIT, SLAPPED, OR OTHERWISE PHYSICALLY HURT BY YOUR PARTNER OR EX-PARTNER?: NO

## 2025-05-05 SDOH — ECONOMIC STABILITY: HOUSING INSECURITY: IN THE PAST 12 MONTHS, HOW MANY TIMES HAVE YOU MOVED WHERE YOU WERE LIVING?: 1

## 2025-05-05 SDOH — SOCIAL STABILITY: SOCIAL NETWORK: HOW OFTEN DO YOU ATTEND MEETINGS OF THE CLUBS OR ORGANIZATIONS YOU BELONG TO?: 1 TO 4 TIMES PER YEAR

## 2025-05-05 SDOH — HEALTH STABILITY: PHYSICAL HEALTH: ON AVERAGE, HOW MANY DAYS PER WEEK DO YOU ENGAGE IN MODERATE TO STRENUOUS EXERCISE (LIKE A BRISK WALK)?: 2 DAYS

## 2025-05-05 SDOH — SOCIAL STABILITY: SOCIAL INSECURITY: DOES ANYONE TRY TO KEEP YOU FROM HAVING/CONTACTING OTHER FRIENDS OR DOING THINGS OUTSIDE YOUR HOME?: NO

## 2025-05-05 SDOH — SOCIAL STABILITY: SOCIAL INSECURITY: DO YOU FEEL ANYONE HAS EXPLOITED OR TAKEN ADVANTAGE OF YOU FINANCIALLY OR OF YOUR PERSONAL PROPERTY?: NO

## 2025-05-05 SDOH — SOCIAL STABILITY: SOCIAL INSECURITY: WERE YOU ABLE TO COMPLETE ALL THE BEHAVIORAL HEALTH SCREENINGS?: YES

## 2025-05-05 ASSESSMENT — ACTIVITIES OF DAILY LIVING (ADL)
ADEQUATE_TO_COMPLETE_ADL: YES
BATHING: INDEPENDENT
WALKS IN HOME: INDEPENDENT
GROOMING: INDEPENDENT
JUDGMENT_ADEQUATE_SAFELY_COMPLETE_DAILY_ACTIVITIES: YES
DRESSING YOURSELF: INDEPENDENT
LACK_OF_TRANSPORTATION: NO
BATHING: INDEPENDENT
WALKS IN HOME: INDEPENDENT
PATIENT'S MEMORY ADEQUATE TO SAFELY COMPLETE DAILY ACTIVITIES?: YES
HEARING - LEFT EAR: FUNCTIONAL
HEARING - LEFT EAR: FUNCTIONAL
DRESSING YOURSELF: INDEPENDENT
TOILETING: INDEPENDENT
FEEDING YOURSELF: INDEPENDENT
TOILETING: INDEPENDENT
ADEQUATE_TO_COMPLETE_ADL: YES
JUDGMENT_ADEQUATE_SAFELY_COMPLETE_DAILY_ACTIVITIES: YES
HEARING - RIGHT EAR: FUNCTIONAL
FEEDING YOURSELF: INDEPENDENT
PATIENT'S MEMORY ADEQUATE TO SAFELY COMPLETE DAILY ACTIVITIES?: YES
GROOMING: INDEPENDENT
HEARING - RIGHT EAR: FUNCTIONAL

## 2025-05-05 ASSESSMENT — PAIN SCALES - GENERAL: PAINLEVEL_OUTOF10: 5 - MODERATE PAIN

## 2025-05-05 ASSESSMENT — COLUMBIA-SUICIDE SEVERITY RATING SCALE - C-SSRS
1. IN THE PAST MONTH, HAVE YOU WISHED YOU WERE DEAD OR WISHED YOU COULD GO TO SLEEP AND NOT WAKE UP?: NO
6. HAVE YOU EVER DONE ANYTHING, STARTED TO DO ANYTHING, OR PREPARED TO DO ANYTHING TO END YOUR LIFE?: NO
2. HAVE YOU ACTUALLY HAD ANY THOUGHTS OF KILLING YOURSELF?: NO

## 2025-05-05 ASSESSMENT — ENCOUNTER SYMPTOMS
COUGH: 0
TROUBLE SWALLOWING: 0
FREQUENCY: 1
APPETITE CHANGE: 0
ABDOMINAL PAIN: 0
SHORTNESS OF BREATH: 0
CONSTIPATION: 0
DIARRHEA: 0
VOMITING: 0
DYSURIA: 1
NAUSEA: 0
DIZZINESS: 1
CONFUSION: 0
FEVER: 0
HEMATURIA: 1

## 2025-05-05 ASSESSMENT — COGNITIVE AND FUNCTIONAL STATUS - GENERAL
MOBILITY SCORE: 24
PATIENT BASELINE BEDBOUND: NO
DAILY ACTIVITIY SCORE: 24

## 2025-05-05 ASSESSMENT — LIFESTYLE VARIABLES
HAVE PEOPLE ANNOYED YOU BY CRITICIZING YOUR DRINKING: NO
TOTAL SCORE: 0
HOW OFTEN DO YOU HAVE 6 OR MORE DRINKS ON ONE OCCASION: NEVER
AUDIT-C TOTAL SCORE: 0
SKIP TO QUESTIONS 9-10: 1
PRESCIPTION_ABUSE_PAST_12_MONTHS: NO
HOW MANY STANDARD DRINKS CONTAINING ALCOHOL DO YOU HAVE ON A TYPICAL DAY: PATIENT DOES NOT DRINK
EVER FELT BAD OR GUILTY ABOUT YOUR DRINKING: NO
SUBSTANCE_ABUSE_PAST_12_MONTHS: NO
EVER HAD A DRINK FIRST THING IN THE MORNING TO STEADY YOUR NERVES TO GET RID OF A HANGOVER: NO
SKIP TO QUESTIONS 9-10: 1
AUDIT-C TOTAL SCORE: 0
AUDIT-C TOTAL SCORE: 0
HOW OFTEN DO YOU HAVE A DRINK CONTAINING ALCOHOL: NEVER
HAVE YOU EVER FELT YOU SHOULD CUT DOWN ON YOUR DRINKING: NO

## 2025-05-05 ASSESSMENT — PATIENT HEALTH QUESTIONNAIRE - PHQ9
1. LITTLE INTEREST OR PLEASURE IN DOING THINGS: NOT AT ALL
2. FEELING DOWN, DEPRESSED OR HOPELESS: NOT AT ALL
SUM OF ALL RESPONSES TO PHQ9 QUESTIONS 1 & 2: 0

## 2025-05-05 ASSESSMENT — PAIN - FUNCTIONAL ASSESSMENT: PAIN_FUNCTIONAL_ASSESSMENT: 0-10

## 2025-05-05 ASSESSMENT — PAIN DESCRIPTION - PAIN TYPE: TYPE: ACUTE PAIN

## 2025-05-05 ASSESSMENT — PAIN DESCRIPTION - LOCATION: LOCATION: OTHER (COMMENT)

## 2025-05-05 NOTE — PROGRESS NOTES
"Gary Pineda \"Donte" is a 80 y.o. male on day 0 of admission presenting with Hematuria, gross.    Patient has no orders for TCC, PT, OT consults.  Advised patient that RNCC is available should any discharge needs arise.     Patient from home with spouse. He is independent with ADLs, iADLs, daily tasks, and drives. PCP is Kale Worthignton DO.  Plan is dc home with no needs, spouse will transport.     Rebecca Rivera RN    "

## 2025-05-05 NOTE — ED TRIAGE NOTES
"Drove self to Er with c/o \"Still bleeding and having burning /frequency with my urine\" per pt. Pt seen Saturday for same complaint \"Seems to be getting worse. I have been feeling dizzy now too\" per pt.   "

## 2025-05-05 NOTE — ED PROVIDER NOTES
"EMERGENCY DEPARTMENT ENCOUNTER      Pt Name: Gary Pineda \"Juan\"  MRN: [id number]  Birthdate 1945  Date of evaluation: 5/5/2025  ED Provider: Audrey Fonseca DO     CHIEF COMPLAINT       Chief Complaint   Patient presents with    Dizziness    Blood in Urine       HISTORY OF PRESENT ILLNESS    Gary Pineda \"Donte" is a 80 y.o. who presents to the emergency department with complaints of dizziness described as a lightheadedness.  He was seen approximately 2 days ago for hematuria.  He is on a baby aspirin but no actual blood thinners.  He had a workup performed at that time that showed acute/subacute clot in the urinary bladder.  He states when he took a shower this morning he had a acute episode of lightheadedness.  He denies any spinning or vertiginous sensation.  He states that he had a hot flash at that time as well.  When he presented to the emergency department he was noted to be slightly hypotensive and felt clammy.  He is still complaining of the ongoing hematuria.  He denies any trauma to the area.  He is feeling improved since arrival to the ER.    REVIEW OF SYSTEMS     Focused ROS performed and negative other than as listed in HPI    PAST MEDICAL HISTORY   Medical History[1]    SURGICAL HISTORY     Surgical History[2]    CURRENT MEDICATIONS       Current Discharge Medication List        CONTINUE these medications which have NOT CHANGED    Details   amoxicillin-clavulanate (Augmentin) 500-125 mg tablet Take 1 tablet by mouth 2 times a day for 10 days.  Qty: 20 tablet, Refills: 0    Associated Diagnoses: Jejunitis      aspirin 81 mg EC tablet Take 1 tablet (81 mg) by mouth once daily.      atorvastatin (Lipitor) 80 mg tablet TAKE 1 TABLET (80 MG) BY MOUTH ONCE DAILY IN THE EVENING.  Qty: 90 tablet, Refills: 1    Associated Diagnoses: Essential (primary) hypertension      blood sugar diagnostic (OneTouch Verio test strips) strip FOR TESTING ONE A DAY DX E 11.9  Qty: 100 strip, Refills: 3    Comments: DX Code " Needed  .  Associated Diagnoses: Type 2 diabetes mellitus without complications      cholecalciferol (Vitamin D-3) 25 MCG (1000 UT) capsule Take 1 capsule (25 mcg) by mouth once daily.      empagliflozin (Jardiance) 25 mg Take 1 tablet (25 mg) by mouth once daily.  Qty: 90 tablet, Refills: 3    Associated Diagnoses: Controlled type 2 diabetes mellitus without complication, without long-term current use of insulin      Januvia 100 mg tablet TAKE 1 TABLET BY MOUTH EVERY DAY  Qty: 90 tablet, Refills: 1    Associated Diagnoses: Controlled type 2 diabetes mellitus without complication, without long-term current use of insulin      losartan (Cozaar) 25 mg tablet TAKE 1 TABLET BY MOUTH EVERY DAY  Qty: 90 tablet, Refills: 1    Associated Diagnoses: Essential (primary) hypertension      metoprolol succinate XL (Toprol-XL) 100 mg 24 hr tablet TAKE 1 TABLET (100 MG) BY MOUTH ONCE DAILY. DO NOT CRUSH OR CHEW.  Qty: 90 tablet, Refills: 1    Associated Diagnoses: Essential (primary) hypertension      NON FORMULARY Take 2-3 each by mouth once daily. Dr. Perez arthritis relief - boswellia, glucosamine, natural ianti-inflammatories, analgesic hers, hyaluronic acid, turmeric, jeff, MSM, shark cartilage      spironolactone (Aldactone) 25 mg tablet TAKE 1 TABLET BY MOUTH EVERY DAY  Qty: 90 tablet, Refills: 1    Comments: PATIENT REQUESTED  Associated Diagnoses: Essential (primary) hypertension      azelastine (Astelin) 137 mcg (0.1 %) nasal spray Administer 2 sprays into each nostril 2 times a day.  Qty: 30 mL, Refills: 5    Associated Diagnoses: Vasomotor rhinitis      triamcinolone (Kenalog) 0.1 % cream Apply topically 2 times a day.  Qty: 80 g, Refills: 0    Associated Diagnoses: Intrinsic eczema             ALLERGIES     Lisinopril    FAMILY HISTORY     Family History[3]     SOCIAL HISTORY     Social History[4]    PHYSICAL EXAM       ED Triage Vitals [05/05/25 0836]   Temperature Heart Rate Respirations BP   36.5 °C (97.7 °F) 71  16 88/54      Pulse Ox Temp Source Heart Rate Source Patient Position   100 % Temporal Radial Sitting      BP Location FiO2 (%)     Right arm --        General: Appears well, no acute distress, alert  Head: Head atraumatic; normocephalic  Eyes: normal inspection; no icterus  ENT: mucosa moist without lesion  Neck: Normal inspection, no meningeal signs  Resp: Normal breath sounds, no wheeze or crackles; No respiratory distress  Chest Wall: no tenderness or deformity  Heart: Heart rate and rhythm regular; No Murmurs  Abdomen: Soft, Non-tender; No distention, guarding, rigidity, or rebound; palpable fullness in the lower abdomen  MSK: Normal appearance; Moves all extremities; No Pedal edema  Neuro: Alert; no focal deficits, moves all extremities  Psych: Mood and Affect normal  Skin: Color appropriate; warm; Dry    DIAGNOSTIC RESULTS   Lab and radiology results are independently interpreted unless noted below.  RADIOLOGY (Per Emergency Physician):     Interpretation per the Radiologist below, if available at the time of this note:  XR chest 1 view   Final Result   Mild cardiomegaly, which is unchanged.                  MACRO:   None        Signed by: Roz Roa 5/5/2025 9:33 AM   Dictation workstation:   [id number]          LABS:  Abnormal Labs Reviewed   CBC WITH AUTO DIFFERENTIAL - Abnormal; Notable for the following components:       Result Value    WBC 12.0 (*)     RBC 3.71 (*)     Hemoglobin 11.6 (*)     Hematocrit 35.2 (*)     Neutrophils Absolute 10.01 (*)     All other components within normal limits   COMPREHENSIVE METABOLIC PANEL - Abnormal; Notable for the following components:    Glucose 236 (*)     Urea Nitrogen 27 (*)     Total Protein 6.1 (*)     All other components within normal limits   POCT GLUCOSE - Abnormal; Notable for the following components:    POCT Glucose 134 (*)     All other components within normal limits   POCT GLUCOSE - Abnormal; Notable for the following components:    POCT Glucose  111 (*)     All other components within normal limits   POCT GLUCOSE - Abnormal; Notable for the following components:    POCT Glucose 142 (*)     All other components within normal limits   POCT GLUCOSE - Abnormal; Notable for the following components:    POCT Glucose 167 (*)     All other components within normal limits       All other labs were within normal range or not returned as of this dictation.    EKG:  Personally interpreted by Audrey Fonseca DO  0851: Sinus rhythm with first-degree AV block ventricular rate 67  normal axis no acute ischemic changes    EMERGENCY DEPARTMENT COURSE/MDM   Patient presents emergency department with complaint of lightheadedness that has self resolved.  He was found to be hypotensive upon arrival.  Currently he is slightly hypertensive.  Workup is initiated.  Bladder scan shows greater than 300 in the bladder postvoid approximately 10 to 15 minutes prior.  Will perform a straight cath as I suspect that this is a large clot.  Workup is initiated.  He is agreeable this plan of care.    ED Course as of 05/06/25 0620   Mon May 05, 2025   1006 Spoke with urology, Dr. Fermin.  The patient has had a almost 4 point drop in hemoglobin in 2 days.  Though he is urinating they do recommend a three-way Welsh catheter with manual and continuous irrigation with admission for further monitoring. [EF]   1158 Consulted as the hospitalist accepts patient for admission.  Patient is updated and agreeable.  Patient is admitted in stable condition. [EF]      ED Course User Index  [EF] Audrey Fonseca DO         Diagnoses as of 05/06/25 0620   Gross hematuria   Anemia, unspecified type   Hematuria, gross       Meds Administered:  Medications   sodium chloride 0.9% infusion (75 mL/hr intravenous Rate/Dose Verify 5/6/25 0045)   pantoprazole (ProtoNix) EC tablet 40 mg (has no administration in time range)     Or   pantoprazole (Protonix) injection 40 mg (has no administration in time range)    acetaminophen (Tylenol) tablet 650 mg (650 mg oral Given 5/6/25 0144)     Or   acetaminophen (Tylenol) oral liquid 650 mg ( oral See Alternative 5/6/25 0144)     Or   acetaminophen (Tylenol) suppository 650 mg ( rectal See Alternative 5/6/25 0144)   ondansetron ODT (Zofran-ODT) disintegrating tablet 4 mg (has no administration in time range)     Or   ondansetron (Zofran) injection 4 mg (has no administration in time range)   polyethylene glycol (Glycolax, Miralax) packet 17 g (has no administration in time range)   insulin lispro injection 0-10 Units ( subcutaneous Not Given 5/5/25 1608)   glucagon (Glucagen) injection 1 mg (has no administration in time range)   dextrose 50 % injection 25 g (has no administration in time range)   glucagon (Glucagen) injection 1 mg (has no administration in time range)   dextrose 50 % injection 12.5 g (has no administration in time range)   atorvastatin (Lipitor) tablet 80 mg (has no administration in time range)   losartan (Cozaar) tablet 25 mg (has no administration in time range)   metoprolol succinate XL (Toprol-XL) 24 hr tablet 100 mg ( oral Dose Auto Held 5/10/25 0900)   spironolactone (Aldactone) tablet 25 mg ( oral Dose Auto Held 5/10/25 0900)       PROCEDURES   Unless otherwise noted below, none  Procedures      FINAL IMPRESSION      1. Hematuria, gross    2. Gross hematuria    3. Anemia, unspecified type          DISPOSITION    Admit 05/05/2025 11:58:41 AM  As a result of their workup, the patient will require admission to the hospital.  The patient was informed of his diagnosis.  The patient was given the opportunity to ask questions and I answered them. The patient agreed to be admitted to the hospital.    Critical Care time: Not Indicated    (Comment: Please note this report has been produced using speech recognition software and may contain errors related to that system including errors in grammar, punctuation, and spelling, as well as words and phrases that may be  inappropriate.  If there are any questions or concerns please feel free to contact the dictating provider for clarification.)    Audrey Fonseca DO, MPH (electronically signed)  Emergency Medicine Physician    History provided by: Patient  Limitations to History: None  External Records Reviewed with Brief Summary: ED note and labs from 5/3/25  Social Determinants of Health which Significantly Impact Care: None identified   EKG Independent Interpretation: EKG interpreted by myself. Please see ED Course for full interpretation.  Independent Result Review and Interpretation: Relevant laboratory and radiographic results were reviewed and independently interpreted by myself.  As necessary, they are commented on in the ED Course.  Chronic conditions affecting the patient's care: As documented above in MDM  The patient was discussed with the following consultants/services: Dr. Gomes, urology and Admission Coordinator who accepted the patient for admission  Care Considerations: As documented above in MDM                 [1]   Past Medical History:  Diagnosis Date    Acute maxillary sinusitis, unspecified 06/27/2017    Acute non-recurrent maxillary sinusitis    Body mass index (BMI) 28.0-28.9, adult 03/12/2019    BMI 28.0-28.9,adult    Body mass index (BMI) 28.0-28.9, adult 10/09/2020    Body mass index (BMI) of 28.0 to 28.9 in adult    Body mass index (BMI) 29.0-29.9, adult 03/21/2020    Body mass index (BMI) of 29.0 to 29.9 in adult    Neoplasm of unspecified behavior of bone, soft tissue, and skin 06/28/2017    Neoplasm of skin of ear    Nontoxic goiter, 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    Other conditions influencing health status 06/08/2015    History of cough    Other conditions influencing health status 03/12/2019    Diabetes mellitus type II, uncontrolled    Pain, unspecified 08/09/2016    Acute pain    Personal history of diseases of the skin and  subcutaneous tissue 12/04/2014    History of seborrheic keratosis    Personal history of other diseases of male genital organs     History of benign prostatic hypertrophy    Personal history of other diseases of the musculoskeletal system and connective tissue     History of arthritis    Prediabetes 12/09/2015    Pre-diabetes    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    Unspecified cataract     Cataract of both eyes    Unspecified injury of unspecified lower leg, initial encounter     Knee injury    Urinary retention 06/13/2023   [2]   Past Surgical History:  Procedure Laterality Date    CATARACT EXTRACTION  02/18/2015    Cataract Surgery    COLONOSCOPY  09/09/2013    Complete Colonoscopy    HERNIA REPAIR  09/09/2013    Inguinal Hernia Repair    KNEE ARTHROSCOPY W/ DEBRIDEMENT  09/09/2013    Knee Arthroscopy (Therapeutic)    OTHER SURGICAL HISTORY  10/09/2020    Knee replacement    ROTATOR CUFF REPAIR  09/09/2013    Rotator Cuff Repair   [3] No family history on file.  [4]   Social History  Socioeconomic History    Marital status:    Tobacco Use    Smoking status: Never    Smokeless tobacco: Never   Substance and Sexual Activity    Alcohol use: Never    Drug use: Never     Social Drivers of Health     Financial Resource Strain: Low Risk  (5/5/2025)    Overall Financial Resource Strain (CARDIA)     Difficulty of Paying Living Expenses: Not hard at all   Food Insecurity: No Food Insecurity (5/5/2025)    Hunger Vital Sign     Worried About Running Out of Food in the Last Year: Never true     Ran Out of Food in the Last Year: Never true   Transportation Needs: No Transportation Needs (5/5/2025)    PRAPARE - Transportation     Lack of Transportation (Medical): No     Lack of Transportation (Non-Medical): No   Physical Activity: Insufficiently Active (5/5/2025)    Exercise Vital Sign     Days of Exercise per Week: 2 days     Minutes of Exercise per Session: 40 min   Stress: No Stress Concern Present  (5/5/2025)    [place] [employer] - Occupational Stress Questionnaire     Feeling of Stress : Not at all   Social Connections: Socially Integrated (5/5/2025)    Social Connection and Isolation Panel [NHANES]     Frequency of Communication with Friends and Family: More than three times a week     Frequency of Social Gatherings with Friends and Family: Three times a week     Attends Confucianist Services: 1 to 4 times per year     Active Member of Clubs or Organizations: No     Attends Club or Organization Meetings: 1 to 4 times per year     Marital Status:    Intimate Partner Violence: Not At Risk (5/5/2025)    Humiliation, Afraid, Rape, and Kick questionnaire     Fear of Current or Ex-Partner: No     Emotionally Abused: No     Physically Abused: No     Sexually Abused: No   Housing Stability: Low Risk  (5/5/2025)    Housing Stability Vital Sign     Unable to Pay for Housing in the Last Year: No     Number of Times Moved in the Last Year: 1     Homeless in the Last Year: No        Audrey Fonseca DO  05/06/25 0621

## 2025-05-05 NOTE — H&P
History Of Present Illness  Juan Pineda is a 80 y.o. male hx of HTN, T2DM, BPH presenting with gross hematuria. Patient states he got laser surgery done for BPH with Dr. Gomes about 2 years ago. He had gross hematuria intermittently for about 1 year after the procedure but it resolved spontaneously around Jan, 2024. States he hasn't had any urinary issues since then until he developed sudden onset gross hematuria on Thursday, 5/1. Denies trauma to the pelvis/genitals. Hematuria continued to worsen over the next couple days with passage of blood clots as well. Presented to [facility] ED on 5/3/25 for evaluation of the hematuria. CT A/P at the time showed acute/subacute blood clot in the urinary bladder as well as incidental findings of acute jejunal diverticulitis. H&H was stable. UA was unable to be performed due to gross hematuria. Patient was discharged home a course of augmentin for diverticulitis and instructed to follow up with urology as OP. The hematuria continued to worsen over the weekend with increased urinary frequency and dysuria. Patient states he was having the urge to urinate every 15-20 minutes. He also reports some dizziness when getting up this morning which is new for him. Denies fever, chills, SOB, CP, abdominal pain, nausea, vomiting, constipation, diarrhea.     In the ED, vitals with initial BP 88/54 which then improved to 106/85 without any intervention. Labs notable for Na 136, K 4.3, Cr 1.09, WBC 12.0, Hgb 11.6, . The ED provider spoke to Dr. Gomes with urology who recommended diego placement to [facility] and hospital admission.     Code status discussed with patient - FULL CODE.      Past Medical History  He has a past medical history of Acute maxillary sinusitis, unspecified (06/27/2017), Body mass index (BMI) 28.0-28.9, adult (03/12/2019), Body mass index (BMI) 28.0-28.9, adult (10/09/2020), Body mass index (BMI) 29.0-29.9, adult (03/21/2020), Neoplasm of unspecified behavior of bone,  soft tissue, and skin (06/28/2017), Nontoxic goiter, unspecified (10/18/2017), Nontoxic multinodular goiter (06/13/2023), Other conditions influencing health status (06/08/2015), Other conditions influencing health status (03/12/2019), Pain, unspecified (08/09/2016), Personal history of diseases of the skin and subcutaneous tissue (12/04/2014), Personal history of other diseases of male genital organs, Personal history of other diseases of the musculoskeletal system and connective tissue, Prediabetes (12/09/2015), 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    Surgical History  He has a past surgical history that includes Knee arthroscopy w/ debridement (09/09/2013); Hernia repair (09/09/2013); Colonoscopy (09/09/2013); Rotator cuff repair (09/09/2013); Cataract extraction (02/18/2015); and Other surgical history (10/09/2020).     Social History  He reports that he has never smoked. He has never used smokeless tobacco. He reports that he does not drink alcohol and does not use drugs.    Family History  Family History[1]     Allergies  Lisinopril    Review of Systems   Constitutional:  Negative for appetite change and fever.   HENT:  Negative for congestion and trouble swallowing.    Respiratory:  Negative for cough and shortness of breath.    Cardiovascular:  Negative for chest pain.   Gastrointestinal:  Negative for abdominal pain, constipation, diarrhea, nausea and vomiting.   Genitourinary:  Positive for dysuria, frequency and hematuria.   Neurological:  Positive for dizziness.   Psychiatric/Behavioral:  Negative for confusion.         Physical Exam  Constitutional:       General: He is not in acute distress.     Appearance: He is not toxic-appearing.   HENT:      Head: Normocephalic.      Mouth/Throat:      Mouth: Mucous membranes are dry.      Pharynx: Oropharynx is clear.   Eyes:       General: No scleral icterus.  Cardiovascular:      Rate and Rhythm: Normal rate.   Pulmonary:      Effort: No respiratory distress.      Breath sounds: No wheezing.   Abdominal:      General: There is no distension.      Palpations: Abdomen is soft.   Musculoskeletal:      Right lower leg: No edema.      Left lower leg: No edema.   Neurological:      Mental Status: He is alert and oriented to person, place, and time.   Psychiatric:         Behavior: Behavior normal.          Last Recorded Vitals  /85 (BP Location: Right arm, Patient Position: Lying)   Pulse 55   Temp 37 °C (98.6 °F) (Oral)   Resp 18   Wt 90.7 kg (200 lb)   SpO2 100%     Relevant Results             Assessment/Plan   Assessment & Plan  Hematuria, gross  Unclear etiology  CT A/P showing acute/subacute blood clot within the bladder of uncertain etiology   Urology consulted  Continue diego to [facility] per urology  Hold home aspirin  UA ordered   Acute blood loss anemia  Due to above  Hgb 15.0 on 5/3/25 -> Hgb 11.6 on 5/5/25  Transfuse Hgb <7  Hold home aspirin  SCDs  Primary hypertension  BP normotensive  Continue home losartan with hold parameters  Hold home aldactone and metoprolol for now, can consider resuming if BP is persistently elevated   Diverticulitis of jejunum  Incidental finding  Asymptomatic  Hold off on antibiotics for now  Type 2 diabetes mellitus with hyperglycemia, without long-term current use of insulin  Hold home januvia and jardiance for now  Start SSI  Hypoglycemia protocol   Dizziness  Suspect likely orthostatic in the setting of acute blood loss and likely dehydration  Hold home metoprolol, aldactone, and jardiance - suspect diuretic effect from aldactone and jardiance is likely exacerbating the dehydration  Fall precautions     Plan:  Admit to RNF  Consult urology  Maintain diego   Hgb 15.0 -> 11.6 over the last 48hrs, will hold aspirin and give gentle IVF for volume expansion  Check BMP, CBC, and INR in the AM  Transfuse  Hgb <7  DVT ppx: SCDs  GI ppx: PPI  Anticipate likely discharge home with no needs when cleared by urology   FULL CODE           Poornima Mendoza MD         [1] No family history on file.

## 2025-05-05 NOTE — ASSESSMENT & PLAN NOTE
BP normotensive  Continue home losartan with hold parameters  Hold home aldactone and metoprolol for now, can consider resuming if BP is persistently elevated

## 2025-05-05 NOTE — ASSESSMENT & PLAN NOTE
Unclear etiology  CT A/P showing acute/subacute blood clot within the bladder of uncertain etiology   Urology consulted  Continue diego to [facility] per urology  Hold home aspirin  UA ordered

## 2025-05-05 NOTE — NURSING NOTE
Spoke with Dr Mendoza about collecting urine studies while continuous irrigation is running. Dr. MORALES says to hold on the testing for now...

## 2025-05-05 NOTE — ASSESSMENT & PLAN NOTE
Suspect likely orthostatic in the setting of acute blood loss and likely dehydration  Hold home metoprolol, aldactone, and jardiance - suspect diuretic effect from aldactone and jardiance is likely exacerbating the dehydration  Fall precautions

## 2025-05-05 NOTE — CARE PLAN
Problem: Pain - Adult  Goal: Verbalizes/displays adequate comfort level or baseline comfort level  Outcome: Progressing     Problem: Safety - Adult  Goal: Free from fall injury  Outcome: Progressing     Problem: Discharge Planning  Goal: Discharge to home or other facility with appropriate resources  Outcome: Progressing     Problem: Chronic Conditions and Co-morbidities  Goal: Patient's chronic conditions and co-morbidity symptoms are monitored and maintained or improved  Outcome: Progressing     Problem: Nutrition  Goal: Nutrient intake appropriate for maintaining nutritional needs  Outcome: Progressing   The patient's goals for the shift include Decrease Hematuria, Safety    The clinical goals for the shift include Decrease Hematuria

## 2025-05-05 NOTE — PROGRESS NOTES
"Pharmacy Medication History Review    Gary Pineda \"Donte" is a 80 y.o. male admitted for Hematuria, gross. Pharmacy reviewed the patient's [id number] medications and allergies for accuracy.    Medications ADDED:  NONFORMULARY - DR. COE ARTHRITIS  Medications CHANGED:  Glyxambi - flag for removal - not taking  Azelastin 137mcg nasal - not taking  Triamcinolone 0.1% cream - not taking  Medications REMOVED:   Acetaminophen arthritis      The list below reflects the updated PTA list. Comments regarding how patient may be taking medications differently can be found in the Admit Orders Activity  Prior to Admission Medications   Prescriptions Last Dose Informant   Hughuvia 100 mg tablet 5/5/2025 Self   Sig: TAKE 1 TABLET BY MOUTH EVERY DAY   NON FORMULARY 5/4/2025 Self   Sig: Take 2-3 each by mouth once daily. Dr. Coe arthritis relief - boswellia, glucosamine, natural ianti-inflammatories, analgesic hers, hyaluronic acid, turmeric, jeff, MSM, shark cartilage   amoxicillin-clavulanate (Augmentin) 500-125 mg tablet 5/5/2025 Self   Sig: Take 1 tablet by mouth 2 times a day for 10 days.   aspirin 81 mg EC tablet 5/2/2025 Self   Sig: Take 1 tablet (81 mg) by mouth once daily.   atorvastatin (Lipitor) 80 mg tablet 5/5/2025 Self   Sig: TAKE 1 TABLET (80 MG) BY MOUTH ONCE DAILY IN THE EVENING.   blood sugar diagnostic (OneTouch Verio test strips) strip Unknown Self   Sig: FOR TESTING ONE A DAY DX E 11.9   cholecalciferol (Vitamin D-3) 25 MCG (1000 UT) capsule 5/5/2025 Self   Sig: Take 1 capsule (25 mcg) by mouth once daily.   empagliflozin (Jardiance) 25 mg 5/5/2025 Self   Sig: Take 1 tablet (25 mg) by mouth once daily.   losartan (Cozaar) 25 mg tablet 5/5/2025 Self   Sig: TAKE 1 TABLET BY MOUTH EVERY DAY   metoprolol succinate XL (Toprol-XL) 100 mg 24 hr tablet 5/5/2025 Self   Sig: TAKE 1 TABLET (100 MG) BY MOUTH ONCE DAILY. DO NOT CRUSH OR CHEW.   spironolactone (Aldactone) 25 mg tablet 5/5/2025 Self   Sig: TAKE 1 " TABLET BY MOUTH EVERY DAY      Facility-Administered Medications: None        The list below reflects the updated allergy list. Please review each documented allergy for additional clarification and justification.  Allergies  Reviewed by Latanya Madrid CPhT on 5/5/2025        Severity Reactions Comments    Lisinopril Not Specified Cough             Pharmacy has been updated to [facility].    Sources used to complete the med history include dispense history, PTA medication list, patient interview. Patient is a good historian.    Below are additional concerns with the patient's PTA list.  None     Latanya Madrid CPhT-Adv  Please reach out via [employer] Secure Chat for questions

## 2025-05-05 NOTE — PROGRESS NOTES
Spiritual Care Visit  Spiritual Care Request    Reason for Visit:  Routine Visit: Introduction  Crisis Visit: ED     Request Received From:       Focus of Care:  Visited With: Patient not available         Refer to :          Spiritual Care Assessment    Spiritual Assessment:                      Care Provided:       Sense of Community and or Episcopal Affiliation:  Hoahaoism   Values/Beliefs  Spiritual Requests During Hospitalization: Juan was not in his room when I tried to see him today.     Addressed Needs/Concerns and/or Jesusita Through:          Outcome:        Advance Directives:         Spiritual Care Annotation    Annotation:  Juan was not in his room today.  Yoandy Márquez

## 2025-05-05 NOTE — NURSING NOTE
Pleasant 80 year old male arrived to floor at this time. Pt was able to ambulate into room with assistance due to the Indwelling 3-way catheter. Pt's urine very bloody with no clots seen. Continuous irrigation is pretty much wide open. Pt not complaining of any pain. Assessment completed as charted. Family at the bedside. Patient given welcome packet and instructed on how to order dinner. Pt has no questions or further needs a this time.

## 2025-05-06 LAB
ANION GAP SERPL CALCULATED.3IONS-SCNC: 10 MMOL/L (ref 10–20)
ATRIAL RATE: 67 BPM
BASOPHILS # BLD AUTO: 0.06 X10*3/UL (ref 0–0.1)
BASOPHILS NFR BLD AUTO: 0.5 %
BUN SERPL-MCNC: 22 MG/DL (ref 6–23)
CALCIUM SERPL-MCNC: 8.2 MG/DL (ref 8.6–10.3)
CHLORIDE SERPL-SCNC: 109 MMOL/L (ref 98–107)
CO2 SERPL-SCNC: 24 MMOL/L (ref 21–32)
CREAT SERPL-MCNC: 0.86 MG/DL (ref 0.5–1.3)
EGFRCR SERPLBLD CKD-EPI 2021: 87 ML/MIN/1.73M*2
EOSINOPHIL # BLD AUTO: 0.09 X10*3/UL (ref 0–0.4)
EOSINOPHIL NFR BLD AUTO: 0.7 %
ERYTHROCYTE [DISTWIDTH] IN BLOOD BY AUTOMATED COUNT: 13.9 % (ref 11.5–14.5)
GLUCOSE BLD MANUAL STRIP-MCNC: 107 MG/DL (ref 74–99)
GLUCOSE BLD MANUAL STRIP-MCNC: 139 MG/DL (ref 74–99)
GLUCOSE BLD MANUAL STRIP-MCNC: 154 MG/DL (ref 74–99)
GLUCOSE BLD MANUAL STRIP-MCNC: 189 MG/DL (ref 74–99)
GLUCOSE SERPL-MCNC: 121 MG/DL (ref 74–99)
HCT VFR BLD AUTO: 31.8 % (ref 41–52)
HGB BLD-MCNC: 10.2 G/DL (ref 13.5–17.5)
IMM GRANULOCYTES # BLD AUTO: 0.04 X10*3/UL (ref 0–0.5)
IMM GRANULOCYTES NFR BLD AUTO: 0.3 % (ref 0–0.9)
INR PPP: 1 (ref 0.9–1.2)
LYMPHOCYTES # BLD AUTO: 2.27 X10*3/UL (ref 0.8–3)
LYMPHOCYTES NFR BLD AUTO: 18.2 %
MCH RBC QN AUTO: 30.9 PG (ref 26–34)
MCHC RBC AUTO-ENTMCNC: 32.1 G/DL (ref 32–36)
MCV RBC AUTO: 96 FL (ref 80–100)
MONOCYTES # BLD AUTO: 0.71 X10*3/UL (ref 0.05–0.8)
MONOCYTES NFR BLD AUTO: 5.7 %
NEUTROPHILS # BLD AUTO: 9.29 X10*3/UL (ref 1.6–5.5)
NEUTROPHILS NFR BLD AUTO: 74.6 %
NRBC BLD-RTO: 0 /100 WBCS (ref 0–0)
P AXIS: 74 DEGREES
P OFFSET: 163 MS
P ONSET: 114 MS
PLATELET # BLD AUTO: 298 X10*3/UL (ref 150–450)
POTASSIUM SERPL-SCNC: 4.1 MMOL/L (ref 3.5–5.3)
PR INTERVAL: 218 MS
PROTHROMBIN TIME: 11.2 SECONDS (ref 9.3–12.7)
Q ONSET: 223 MS
QRS COUNT: 11 BEATS
QRS DURATION: 80 MS
QT INTERVAL: 390 MS
QTC CALCULATION(BAZETT): 412 MS
QTC FREDERICIA: 404 MS
R AXIS: 46 DEGREES
RBC # BLD AUTO: 3.3 X10*6/UL (ref 4.5–5.9)
SODIUM SERPL-SCNC: 139 MMOL/L (ref 136–145)
T AXIS: 42 DEGREES
T OFFSET: 418 MS
VENTRICULAR RATE: 67 BPM
WBC # BLD AUTO: 12.5 X10*3/UL (ref 4.4–11.3)

## 2025-05-06 PROCEDURE — 85610 PROTHROMBIN TIME: CPT | Performed by: STUDENT IN AN ORGANIZED HEALTH CARE EDUCATION/TRAINING PROGRAM

## 2025-05-06 PROCEDURE — 2500000005 HC RX 250 GENERAL PHARMACY W/O HCPCS: Mod: JZ | Performed by: SURGERY

## 2025-05-06 PROCEDURE — 2500000001 HC RX 250 WO HCPCS SELF ADMINISTERED DRUGS (ALT 637 FOR MEDICARE OP): Performed by: STUDENT IN AN ORGANIZED HEALTH CARE EDUCATION/TRAINING PROGRAM

## 2025-05-06 PROCEDURE — 99232 SBSQ HOSP IP/OBS MODERATE 35: CPT | Performed by: STUDENT IN AN ORGANIZED HEALTH CARE EDUCATION/TRAINING PROGRAM

## 2025-05-06 PROCEDURE — 99222 1ST HOSP IP/OBS MODERATE 55: CPT | Performed by: SURGERY

## 2025-05-06 PROCEDURE — 1210000001 HC SEMI-PRIVATE ROOM DAILY

## 2025-05-06 PROCEDURE — 2500000002 HC RX 250 W HCPCS SELF ADMINISTERED DRUGS (ALT 637 FOR MEDICARE OP, ALT 636 FOR OP/ED): Performed by: STUDENT IN AN ORGANIZED HEALTH CARE EDUCATION/TRAINING PROGRAM

## 2025-05-06 PROCEDURE — 85025 COMPLETE CBC W/AUTO DIFF WBC: CPT | Performed by: STUDENT IN AN ORGANIZED HEALTH CARE EDUCATION/TRAINING PROGRAM

## 2025-05-06 PROCEDURE — 82374 ASSAY BLOOD CARBON DIOXIDE: CPT | Performed by: STUDENT IN AN ORGANIZED HEALTH CARE EDUCATION/TRAINING PROGRAM

## 2025-05-06 PROCEDURE — 36415 COLL VENOUS BLD VENIPUNCTURE: CPT | Performed by: STUDENT IN AN ORGANIZED HEALTH CARE EDUCATION/TRAINING PROGRAM

## 2025-05-06 PROCEDURE — 82947 ASSAY GLUCOSE BLOOD QUANT: CPT

## 2025-05-06 RX ORDER — SODIUM CHLORIDE 0.9 G/100ML
3000 INJECTION, SOLUTION IRRIGATION CONTINUOUS
Status: DISCONTINUED | OUTPATIENT
Start: 2025-05-06 | End: 2025-05-07 | Stop reason: HOSPADM

## 2025-05-06 RX ADMIN — PANTOPRAZOLE SODIUM 40 MG: 40 TABLET, DELAYED RELEASE ORAL at 06:33

## 2025-05-06 RX ADMIN — ATORVASTATIN CALCIUM 80 MG: 80 TABLET, FILM COATED ORAL at 20:44

## 2025-05-06 RX ADMIN — INSULIN LISPRO 2 UNITS: 100 INJECTION, SOLUTION INTRAVENOUS; SUBCUTANEOUS at 16:34

## 2025-05-06 RX ADMIN — ACETAMINOPHEN 650 MG: 325 TABLET ORAL at 01:44

## 2025-05-06 RX ADMIN — SODIUM CHLORIDE 3000 ML: 900 IRRIGANT IRRIGATION at 08:17

## 2025-05-06 ASSESSMENT — COGNITIVE AND FUNCTIONAL STATUS - GENERAL
PERSONAL GROOMING: A LITTLE
PERSONAL GROOMING: A LITTLE
DAILY ACTIVITIY SCORE: 19
CLIMB 3 TO 5 STEPS WITH RAILING: A LITTLE
TOILETING: A LITTLE
TOILETING: A LITTLE
DAILY ACTIVITIY SCORE: 19
WALKING IN HOSPITAL ROOM: A LITTLE
MOBILITY SCORE: 22
DRESSING REGULAR LOWER BODY CLOTHING: A LITTLE
DRESSING REGULAR UPPER BODY CLOTHING: A LITTLE
DRESSING REGULAR LOWER BODY CLOTHING: A LITTLE
CLIMB 3 TO 5 STEPS WITH RAILING: A LITTLE
DRESSING REGULAR UPPER BODY CLOTHING: A LITTLE
WALKING IN HOSPITAL ROOM: A LITTLE
MOBILITY SCORE: 22
HELP NEEDED FOR BATHING: A LITTLE
HELP NEEDED FOR BATHING: A LITTLE

## 2025-05-06 ASSESSMENT — PAIN SCALES - PAIN ASSESSMENT IN ADVANCED DEMENTIA (PAINAD)
CONSOLABILITY: NO NEED TO CONSOLE
BODYLANGUAGE: RELAXED
BREATHING: NORMAL
FACIALEXPRESSION: SMILING OR INEXPRESSIVE
TOTALSCORE: 0

## 2025-05-06 ASSESSMENT — ENCOUNTER SYMPTOMS
CARDIOVASCULAR NEGATIVE: 1
FEVER: 0
NAUSEA: 0
HEMATURIA: 1
FLANK PAIN: 0
RESPIRATORY NEGATIVE: 1
ABDOMINAL PAIN: 0
VOMITING: 0

## 2025-05-06 ASSESSMENT — PAIN - FUNCTIONAL ASSESSMENT
PAIN_FUNCTIONAL_ASSESSMENT: 0-10

## 2025-05-06 ASSESSMENT — PAIN DESCRIPTION - DESCRIPTORS: DESCRIPTORS: ACHING

## 2025-05-06 ASSESSMENT — PAIN SCALES - WONG BAKER: WONGBAKER_NUMERICALRESPONSE: NO HURT

## 2025-05-06 ASSESSMENT — PAIN SCALES - GENERAL
PAINLEVEL_OUTOF10: 0 - NO PAIN
PAINLEVEL_OUTOF10: 0 - NO PAIN
PAINLEVEL_OUTOF10: 4
PAINLEVEL_OUTOF10: 0 - NO PAIN
PAINLEVEL_OUTOF10: 0 - NO PAIN

## 2025-05-06 ASSESSMENT — PAIN DESCRIPTION - LOCATION: LOCATION: ABDOMEN

## 2025-05-06 NOTE — CARE PLAN
The patient's goals for the shift include Decrease Hematuria, Safety    The clinical goals for the shift include Decreased hematuria

## 2025-05-06 NOTE — PROGRESS NOTES
Patient not medically clear. Patient followed by urology and is having bladder irrigation. At the time of discharge, patient will return home with no skilled services. Will follow.      05/06/25 1250   Discharge Planning   Home or Post Acute Services None   Expected Discharge Disposition Home   Does the patient need discharge transport arranged? No

## 2025-05-06 NOTE — NURSING NOTE
Urology saw pt, did another manual irrigation,  lots of clots came out, pt tolerated, denies pain at this time.

## 2025-05-06 NOTE — PROGRESS NOTES
Spiritual Care Visit  Spiritual Care Request    Reason for Visit:  Routine Visit: Introduction     Request Received From:       Focus of Care:  Visited With: Patient         Refer to :          Spiritual Care Assessment    Spiritual Assessment:                      Care Provided:  Intended Effects: Build relationship of care and support, Convey a calming presence, Demonstrate caring and concern    Sense of Community and or Pentecostalism Affiliation:  Yazidism   Values/Beliefs  Spiritual Requests During Hospitalization: Juan asked to be anointed today.     Addressed Needs/Concerns and/or Jesusita Through:     Sacramental Encounters  Sacrament of Sick-Anointing: Anointed    Outcome:        Advance Directives:         Spiritual Care Annotation    Annotation:  Juan asked to be anointed today   Yoandy Márquez

## 2025-05-06 NOTE — CONSULTS
Reason For Consult  Hematuria    History Of Present Illness  Juan Pineda is a 80 y.o. male presenting with gross hematuria.  He is a patient known to me.  He has a history of BPH with obstructive symptoms.  He had a greenlight laser vaporization done in 2023.  Since that time he has had intermittent hematuria.  He was last seen by me in 2024.  At that time a CT urogram revealed no acute pathology.  He came to the ER recently with a 5-day history of worsening hematuria.  A three-way catheter was placed and irrigation started.  He has not had any pain.  He was voiding spontaneously at home.  His urine culture is pending.     Past Medical History  He has a past medical history of Acute maxillary sinusitis, unspecified (06/27/2017), Body mass index (BMI) 28.0-28.9, adult (03/12/2019), Body mass index (BMI) 28.0-28.9, adult (10/09/2020), Body mass index (BMI) 29.0-29.9, adult (03/21/2020), Neoplasm of unspecified behavior of bone, soft tissue, and skin (06/28/2017), Nontoxic goiter, unspecified (10/18/2017), Nontoxic multinodular goiter (06/13/2023), Other conditions influencing health status (06/08/2015), Other conditions influencing health status (03/12/2019), Pain, unspecified (08/09/2016), Personal history of diseases of the skin and subcutaneous tissue (12/04/2014), Personal history of other diseases of male genital organs, Personal history of other diseases of the musculoskeletal system and connective tissue, Prediabetes (12/09/2015), 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    Surgical History  He has a past surgical history that includes Knee arthroscopy w/ debridement (09/09/2013); Hernia repair (09/09/2013); Colonoscopy (09/09/2013); Rotator cuff repair (09/09/2013); Cataract extraction (02/18/2015); and Other surgical history (10/09/2020).     Social History  He reports that he has  "never smoked. He has never used smokeless tobacco. He reports that he does not drink alcohol and does not use drugs.    Family History  Family History[1]     Allergies  Lisinopril    Review of Systems  Review of Systems   Constitutional: Negative for fever.   Cardiovascular: Negative.    Respiratory: Negative.     Gastrointestinal:  Negative for abdominal pain, nausea and vomiting.   Genitourinary:  Positive for hematuria. Negative for flank pain.          Physical Exam  Awake, alert, no distress  Breathing comfortably  Abdomen soft, ND, minimally tender  Circumcised, Welsh- manually irrigated, large amount of clots     Last Recorded Vitals  Blood pressure 107/63, pulse 59, temperature 36.3 °C (97.3 °F), temperature source Oral, resp. rate 18, height 1.803 m (5' 11\"), weight 90.7 kg (200 lb), SpO2 98%.    Relevant Results      Results for orders placed or performed during the hospital encounter of 05/05/25 (from the past 24 hours)   CBC with Differential   Result Value Ref Range    WBC 12.0 (H) 4.4 - 11.3 x10*3/uL    nRBC 0.0 0.0 - 0.0 /100 WBCs    RBC 3.71 (L) 4.50 - 5.90 x10*6/uL    Hemoglobin 11.6 (L) 13.5 - 17.5 g/dL    Hematocrit 35.2 (L) 41.0 - 52.0 %    MCV 95 80 - 100 fL    MCH 31.3 26.0 - 34.0 pg    MCHC 33.0 32.0 - 36.0 g/dL    RDW 13.9 11.5 - 14.5 %    Platelets 368 150 - 450 x10*3/uL    Neutrophils % 83.2 40.0 - 80.0 %    Immature Granulocytes %, Automated 0.5 0.0 - 0.9 %    Lymphocytes % 11.0 13.0 - 44.0 %    Monocytes % 4.3 2.0 - 10.0 %    Eosinophils % 0.6 0.0 - 6.0 %    Basophils % 0.4 0.0 - 2.0 %    Neutrophils Absolute 10.01 (H) 1.60 - 5.50 x10*3/uL    Immature Granulocytes Absolute, Automated 0.06 0.00 - 0.50 x10*3/uL    Lymphocytes Absolute 1.32 0.80 - 3.00 x10*3/uL    Monocytes Absolute 0.52 0.05 - 0.80 x10*3/uL    Eosinophils Absolute 0.07 0.00 - 0.40 x10*3/uL    Basophils Absolute 0.05 0.00 - 0.10 x10*3/uL   Comprehensive Metabolic Panel   Result Value Ref Range    Glucose 236 (H) 74 - 99 " mg/dL    Sodium 136 136 - 145 mmol/L    Potassium 4.3 3.5 - 5.3 mmol/L    Chloride 104 98 - 107 mmol/L    Bicarbonate 23 21 - 32 mmol/L    Anion Gap 13 10 - 20 mmol/L    Urea Nitrogen 27 (H) 6 - 23 mg/dL    Creatinine 1.09 0.50 - 1.30 mg/dL    eGFR 68 >60 mL/min/1.73m*2    Calcium 8.8 8.6 - 10.3 mg/dL    Albumin 3.5 3.4 - 5.0 g/dL    Alkaline Phosphatase 79 33 - 136 U/L    Total Protein 6.1 (L) 6.4 - 8.2 g/dL    AST 13 9 - 39 U/L    Bilirubin, Total 1.2 0.0 - 1.2 mg/dL    ALT 16 10 - 52 U/L   Brain Natriuretic Peptide   Result Value Ref Range    BNP 87 0 - 99 pg/mL   Magnesium   Result Value Ref Range    Magnesium 1.92 1.60 - 2.40 mg/dL   Troponin I, High Sensitivity, Initial   Result Value Ref Range    Troponin I, High Sensitivity 8 0 - 20 ng/L   ECG 12 lead   Result Value Ref Range    Ventricular Rate 67 BPM    Atrial Rate 67 BPM    LA Interval 218 ms    QRS Duration 80 ms    QT Interval 390 ms    QTC Calculation(Bazett) 412 ms    P Axis 74 degrees    R Axis 46 degrees    T Axis 42 degrees    QRS Count 11 beats    Q Onset 223 ms    P Onset 114 ms    P Offset 163 ms    T Offset 418 ms    QTC Fredericia 404 ms   Troponin, High Sensitivity, 1 Hour   Result Value Ref Range    Troponin I, High Sensitivity 8 0 - 20 ng/L   POCT GLUCOSE   Result Value Ref Range    POCT Glucose 134 (H) 74 - 99 mg/dL   POCT GLUCOSE   Result Value Ref Range    POCT Glucose 111 (H) 74 - 99 mg/dL   POCT GLUCOSE   Result Value Ref Range    POCT Glucose 142 (H) 74 - 99 mg/dL   POCT GLUCOSE   Result Value Ref Range    POCT Glucose 167 (H) 74 - 99 mg/dL          Assessment/Plan     Gross hematuria, history of BPH.     Agree with bladder irrigation.  Titrate to clear or light pink.  May manually irrigate PRN.   Ultimately he will need an outpatient cystoscopy.    Monitor hemoglobin.       I spent 60 minutes in the professional and overall care of this patient.      Faustino Gomes MD         [1] No family history on file.

## 2025-05-06 NOTE — CARE PLAN
Problem: Pain - Adult  Goal: Verbalizes/displays adequate comfort level or baseline comfort level  Outcome: Progressing     Problem: Safety - Adult  Goal: Free from fall injury  Outcome: Progressing     Problem: Discharge Planning  Goal: Discharge to home or other facility with appropriate resources  Outcome: Progressing     Problem: Chronic Conditions and Co-morbidities  Goal: Patient's chronic conditions and co-morbidity symptoms are monitored and maintained or improved  Outcome: Progressing     Problem: Nutrition  Goal: Nutrient intake appropriate for maintaining nutritional needs  Outcome: Progressing   The patient's goals for the shift include Decrease Hematuria, Safety    The clinical goals for the shift include Decreased Hematuria

## 2025-05-06 NOTE — NURSING NOTE
Pt tolerating continuous bladder irrigation, two bags used since the beginning of thi shift, bright red out put, a few clots noted. Pt denies discomfort, currently resting, will continue to monitor.

## 2025-05-06 NOTE — PROGRESS NOTES
"Gary Pineda \"Donte" is a 80 y.o. male on day 1 of admission presenting with Hematuria, gross.      Subjective   Feels well today, denies any complaints currently. Continues to have hematuria with clots but seems to be starting to clear up. Tolerating PO. Denies dizziness or lightheadedness        Objective     Last Recorded Vitals  /58 (BP Location: Right arm, Patient Position: Lying)   Pulse 50   Temp 36.6 °C (97.8 °F) (Oral)   Resp 19   Wt 90.7 kg (200 lb)   SpO2 97%   Intake/Output last 3 Shifts:    Intake/Output Summary (Last 24 hours) at 5/6/2025 1346  Last data filed at 5/6/2025 1320  Gross per 24 hour   Intake 1432.5 ml   Output 18045 ml   Net -86955.5 ml       Admission Weight  Weight: 90.7 kg (200 lb) (05/05/25 0836)    Daily Weight  05/05/25 : 90.7 kg (200 lb)    Image Results  ECG 12 lead  Sinus rhythm with 1st degree AV block  Otherwise normal ECG  When compared with ECG of 20-AUG-2020 10:12,  No significant change was found  XR chest 1 view  Narrative: Interpreted By:  Roz Roa,   STUDY:  XR CHEST 1 VIEW;  5/5/2025 9:12 am      INDICATION:  Signs/Symptoms:Chest Pain.      COMPARISON:  07/31/2016      ACCESSION NUMBER(S):  [id number]      ORDERING CLINICIAN:  POONAM BECK      FINDINGS:  Elevation of the right hemidiaphragm is again seen which is  unchanged.  The heart is mildly enlarged. No infiltrate, pleural  effusion or pneumothorax is seen.      Impression: Mild cardiomegaly, which is unchanged.              MACRO:  None      Signed by: Roz Roa 5/5/2025 9:33 AM  Dictation workstation:   [id number]      Physical Exam  Constitutional:       General: He is not in acute distress.     Appearance: He is not toxic-appearing.   HENT:      Head: Normocephalic.      Mouth/Throat:      Pharynx: Oropharynx is clear.   Eyes:      General: No scleral icterus.  Cardiovascular:      Rate and Rhythm: Bradycardia present.   Pulmonary:      Effort: No respiratory distress.      Breath sounds: " No wheezing.   Abdominal:      General: There is no distension.      Palpations: Abdomen is soft.   Genitourinary:     Comments: Diego in place with continuous irrigation. Urine in the diego bag is mostly clear but still has hematuria with small blood clots in the tubing  Musculoskeletal:      Right lower leg: No edema.      Left lower leg: No edema.   Neurological:      Mental Status: He is alert and oriented to person, place, and time.   Psychiatric:         Behavior: Behavior normal.         Relevant Results                      This patient has a urinary catheter   Reason for the urinary catheter remaining today? urinary retention/bladder outlet obstruction, acute or chronic          Assessment & Plan  Hematuria, gross  Unclear etiology  CT A/P showing acute/subacute blood clot within the bladder of uncertain etiology   Urology consulted  Continue diego to CBI per urology  Hold home aspirin  UA ordered   Acute blood loss anemia  Due to above  Hgb 15.0 on 5/3/25 -> Hgb 11.6 on 5/5/25  Transfuse Hgb <7  Hold home aspirin  SCDs  Primary hypertension  BP normotensive  Continue home losartan with hold parameters  Hold home aldactone and metoprolol for now, can consider resuming if BP is persistently elevated   Diverticulitis of jejunum  Incidental finding  Asymptomatic  Hold off on antibiotics for now  Type 2 diabetes mellitus with hyperglycemia, without long-term current use of insulin  Hold home januvia and jardiance for now  Start SSI  Hypoglycemia protocol   Dizziness  Suspect likely orthostatic in the setting of acute blood loss and likely dehydration  Hold home metoprolol, aldactone, and jardiance - suspect diuretic effect from aldactone and jardiance is likely exacerbating the dehydration  Fall precautions     Plan:  AM labs reviewed -> Hgb down to 10.2 today  Check CBC to monitor anemia, transfuse for Hgb <7  BP continues to be low/normal, HR in the 50s -> continue to hold metoprolol and BP meds  Urology note  reviewed -> continue bladder irrigation, titrate to clear/light pink  Anticipate discharge home with no needs when cleared by urology and/or when hematuria resolves  Will need OP cystoscopy after hospital discharge              Poornima Mendoza MD

## 2025-05-06 NOTE — NURSING NOTE
"Pt complaining of abdominal pressure, pt manually irrigated with lots of clots in his bladder, pt relieved after clots taken out, pt stated, \"I feel so much better\". Pt washed up, gown and bed linens changed, Tylenol given for the pain, and provides comfort, safety in place, will be closely monitored.   "

## 2025-05-07 ENCOUNTER — APPOINTMENT (OUTPATIENT)
Dept: PREADMISSION TESTING | Facility: HOSPITAL | Age: 80
End: 2025-05-07
Payer: MEDICARE

## 2025-05-07 VITALS
TEMPERATURE: 98.2 F | SYSTOLIC BLOOD PRESSURE: 108 MMHG | BODY MASS INDEX: 28 KG/M2 | RESPIRATION RATE: 18 BRPM | DIASTOLIC BLOOD PRESSURE: 63 MMHG | HEIGHT: 71 IN | OXYGEN SATURATION: 95 % | HEART RATE: 75 BPM | WEIGHT: 200 LBS

## 2025-05-07 LAB
ANION GAP SERPL CALCULATED.3IONS-SCNC: 14 MMOL/L (ref 10–20)
BASOPHILS # BLD AUTO: 0.04 X10*3/UL (ref 0–0.1)
BASOPHILS NFR BLD AUTO: 0.4 %
BUN SERPL-MCNC: 17 MG/DL (ref 6–23)
CALCIUM SERPL-MCNC: 7.9 MG/DL (ref 8.6–10.3)
CHLORIDE SERPL-SCNC: 105 MMOL/L (ref 98–107)
CO2 SERPL-SCNC: 20 MMOL/L (ref 21–32)
CREAT SERPL-MCNC: 0.81 MG/DL (ref 0.5–1.3)
EGFRCR SERPLBLD CKD-EPI 2021: 89 ML/MIN/1.73M*2
EOSINOPHIL # BLD AUTO: 0.12 X10*3/UL (ref 0–0.4)
EOSINOPHIL NFR BLD AUTO: 1.2 %
ERYTHROCYTE [DISTWIDTH] IN BLOOD BY AUTOMATED COUNT: 13.8 % (ref 11.5–14.5)
GLUCOSE BLD MANUAL STRIP-MCNC: 116 MG/DL (ref 74–99)
GLUCOSE BLD MANUAL STRIP-MCNC: 161 MG/DL (ref 74–99)
GLUCOSE BLD MANUAL STRIP-MCNC: 210 MG/DL (ref 74–99)
GLUCOSE SERPL-MCNC: 103 MG/DL (ref 74–99)
HCT VFR BLD AUTO: 27.7 % (ref 41–52)
HGB BLD-MCNC: 9.1 G/DL (ref 13.5–17.5)
IMM GRANULOCYTES # BLD AUTO: 0.07 X10*3/UL (ref 0–0.5)
IMM GRANULOCYTES NFR BLD AUTO: 0.7 % (ref 0–0.9)
LYMPHOCYTES # BLD AUTO: 2.2 X10*3/UL (ref 0.8–3)
LYMPHOCYTES NFR BLD AUTO: 21.3 %
MCH RBC QN AUTO: 30.7 PG (ref 26–34)
MCHC RBC AUTO-ENTMCNC: 32.9 G/DL (ref 32–36)
MCV RBC AUTO: 94 FL (ref 80–100)
MONOCYTES # BLD AUTO: 0.82 X10*3/UL (ref 0.05–0.8)
MONOCYTES NFR BLD AUTO: 7.9 %
NEUTROPHILS # BLD AUTO: 7.09 X10*3/UL (ref 1.6–5.5)
NEUTROPHILS NFR BLD AUTO: 68.5 %
NRBC BLD-RTO: 0 /100 WBCS (ref 0–0)
PLATELET # BLD AUTO: 261 X10*3/UL (ref 150–450)
POTASSIUM SERPL-SCNC: 3.8 MMOL/L (ref 3.5–5.3)
RBC # BLD AUTO: 2.96 X10*6/UL (ref 4.5–5.9)
SODIUM SERPL-SCNC: 135 MMOL/L (ref 136–145)
WBC # BLD AUTO: 10.3 X10*3/UL (ref 4.4–11.3)

## 2025-05-07 PROCEDURE — 36415 COLL VENOUS BLD VENIPUNCTURE: CPT | Performed by: STUDENT IN AN ORGANIZED HEALTH CARE EDUCATION/TRAINING PROGRAM

## 2025-05-07 PROCEDURE — 85025 COMPLETE CBC W/AUTO DIFF WBC: CPT | Performed by: STUDENT IN AN ORGANIZED HEALTH CARE EDUCATION/TRAINING PROGRAM

## 2025-05-07 PROCEDURE — 99239 HOSP IP/OBS DSCHRG MGMT >30: CPT | Performed by: NURSE PRACTITIONER

## 2025-05-07 PROCEDURE — 2500000001 HC RX 250 WO HCPCS SELF ADMINISTERED DRUGS (ALT 637 FOR MEDICARE OP): Performed by: STUDENT IN AN ORGANIZED HEALTH CARE EDUCATION/TRAINING PROGRAM

## 2025-05-07 PROCEDURE — 80048 BASIC METABOLIC PNL TOTAL CA: CPT | Performed by: STUDENT IN AN ORGANIZED HEALTH CARE EDUCATION/TRAINING PROGRAM

## 2025-05-07 PROCEDURE — 99232 SBSQ HOSP IP/OBS MODERATE 35: CPT | Performed by: SURGERY

## 2025-05-07 PROCEDURE — 82947 ASSAY GLUCOSE BLOOD QUANT: CPT

## 2025-05-07 PROCEDURE — 2500000002 HC RX 250 W HCPCS SELF ADMINISTERED DRUGS (ALT 637 FOR MEDICARE OP, ALT 636 FOR OP/ED): Performed by: STUDENT IN AN ORGANIZED HEALTH CARE EDUCATION/TRAINING PROGRAM

## 2025-05-07 RX ORDER — ACETAMINOPHEN 325 MG/1
650 TABLET ORAL EVERY 4 HOURS PRN
Start: 2025-05-07

## 2025-05-07 RX ADMIN — PANTOPRAZOLE SODIUM 40 MG: 40 TABLET, DELAYED RELEASE ORAL at 06:09

## 2025-05-07 RX ADMIN — INSULIN LISPRO 4 UNITS: 100 INJECTION, SOLUTION INTRAVENOUS; SUBCUTANEOUS at 09:42

## 2025-05-07 RX ADMIN — LOSARTAN POTASSIUM 25 MG: 25 TABLET, FILM COATED ORAL at 09:44

## 2025-05-07 ASSESSMENT — COGNITIVE AND FUNCTIONAL STATUS - GENERAL
CLIMB 3 TO 5 STEPS WITH RAILING: A LITTLE
DAILY ACTIVITIY SCORE: 22
WALKING IN HOSPITAL ROOM: A LITTLE
TOILETING: A LITTLE
DRESSING REGULAR LOWER BODY CLOTHING: A LITTLE
MOBILITY SCORE: 22

## 2025-05-07 ASSESSMENT — PAIN SCALES - GENERAL: PAINLEVEL_OUTOF10: 0 - NO PAIN

## 2025-05-07 ASSESSMENT — PAIN - FUNCTIONAL ASSESSMENT: PAIN_FUNCTIONAL_ASSESSMENT: 0-10

## 2025-05-07 NOTE — PROGRESS NOTES
"Gary Pineda \"Donte" is a 80 y.o. male on day 2 of admission presenting with Hematuria, gross.    Subjective   He had no acute overnight events.  His urine has remained clear.  He has not had any abdominal pain.       Objective     Physical Exam  Awake, alert  Abdomen soft, ND, NT  Welsh with clear urine      Last Recorded Vitals  Blood pressure 116/62, pulse 64, temperature 36.6 °C (97.9 °F), temperature source Oral, resp. rate 18, height 1.803 m (5' 11\"), weight 90.7 kg (200 lb), SpO2 97%.  Intake/Output last 3 Shifts:  I/O last 3 completed shifts:  In: 1533.8 (16.9 mL/kg) [P.O.:1040; I.V.:493.8 (5.4 mL/kg)]  Out: 67136 (494.1 mL/kg) [Urine:22269 (13.7 mL/kg/hr)]  Weight: 90.7 kg     Relevant Results  Scheduled medications  Scheduled Medications[1]  Continuous medications  Continuous Medications[2]  PRN medications  PRN Medications[3]    Results for orders placed or performed during the hospital encounter of 05/05/25 (from the past 24 hours)   POCT GLUCOSE   Result Value Ref Range    POCT Glucose 154 (H) 74 - 99 mg/dL   POCT GLUCOSE   Result Value Ref Range    POCT Glucose 189 (H) 74 - 99 mg/dL   POCT GLUCOSE   Result Value Ref Range    POCT Glucose 107 (H) 74 - 99 mg/dL   Basic Metabolic Panel   Result Value Ref Range    Glucose 103 (H) 74 - 99 mg/dL    Sodium 135 (L) 136 - 145 mmol/L    Potassium 3.8 3.5 - 5.3 mmol/L    Chloride 105 98 - 107 mmol/L    Bicarbonate 20 (L) 21 - 32 mmol/L    Anion Gap 14 10 - 20 mmol/L    Urea Nitrogen 17 6 - 23 mg/dL    Creatinine 0.81 0.50 - 1.30 mg/dL    eGFR 89 >60 mL/min/1.73m*2    Calcium 7.9 (L) 8.6 - 10.3 mg/dL   CBC and Auto Differential   Result Value Ref Range    WBC 10.3 4.4 - 11.3 x10*3/uL    nRBC 0.0 0.0 - 0.0 /100 WBCs    RBC 2.96 (L) 4.50 - 5.90 x10*6/uL    Hemoglobin 9.1 (L) 13.5 - 17.5 g/dL    Hematocrit 27.7 (L) 41.0 - 52.0 %    MCV 94 80 - 100 fL    MCH 30.7 26.0 - 34.0 pg    MCHC 32.9 32.0 - 36.0 g/dL    RDW 13.8 11.5 - 14.5 %    Platelets 261 150 - 450 x10*3/uL "    Neutrophils % 68.5 40.0 - 80.0 %    Immature Granulocytes %, Automated 0.7 0.0 - 0.9 %    Lymphocytes % 21.3 13.0 - 44.0 %    Monocytes % 7.9 2.0 - 10.0 %    Eosinophils % 1.2 0.0 - 6.0 %    Basophils % 0.4 0.0 - 2.0 %    Neutrophils Absolute 7.09 (H) 1.60 - 5.50 x10*3/uL    Immature Granulocytes Absolute, Automated 0.07 0.00 - 0.50 x10*3/uL    Lymphocytes Absolute 2.20 0.80 - 3.00 x10*3/uL    Monocytes Absolute 0.82 (H) 0.05 - 0.80 x10*3/uL    Eosinophils Absolute 0.12 0.00 - 0.40 x10*3/uL    Basophils Absolute 0.04 0.00 - 0.10 x10*3/uL       Imaging  XR chest 1 view  Result Date: 5/5/2025  Mild cardiomegaly, which is unchanged.       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 All of these images appear in the key image series   No perforation, abscess or free fluid   Acute/subacute blood clot in the urinary bladder lumen of uncertain exact etiology   No other interval change from CT urogram 25 January 2024   Markedly enlarged prostate indenting the bladder base is unchanged   Still no stone anywhere in the urinary tract   No acute hemorrhage/hematoma in the left collecting system   MACRO: None   Signed by: Slade Estrada 5/3/2025 8:39 AM Dictation workstation:   [id number]      Cardiology, Vascular, and Other Imaging  ECG 12 lead  Result Date: 5/5/2025  Sinus rhythm with 1st degree AV block Otherwise normal ECG When compared with ECG of 20-AUG-2020 10:12, No significant change was found                               Assessment & Plan  Hematuria, gross    Primary  hypertension    Acute blood loss anemia    Diverticulitis of jejunum    Type 2 diabetes mellitus with hyperglycemia, without long-term current use of insulin    Dizziness    Clinically he is improved.  We will discontinue his drip today.  If his urine remains clear he may undergo a voiding trial.  Ultimately he will need outpatient cystoscopy.  If he has recurrent BPH, I counseled him on future treatment options including HoLEP and prostate artery embolization.            I spent 22 minutes in the professional and overall care of this patient.      Faustino Gomes MD           [1] atorvastatin, 80 mg, oral, q PM  insulin lispro, 0-10 Units, subcutaneous, TID AC  losartan, 25 mg, oral, Daily  [Held by provider] metoprolol succinate XL, 100 mg, oral, Daily  pantoprazole, 40 mg, oral, Daily before breakfast   Or  pantoprazole, 40 mg, intravenous, Daily before breakfast  [Held by provider] spironolactone, 25 mg, oral, Daily    [2] sodium chloride, 3,000 mL    [3] PRN medications: acetaminophen **OR** acetaminophen **OR** acetaminophen, dextrose, dextrose, glucagon, glucagon, ondansetron ODT **OR** ondansetron, polyethylene glycol

## 2025-05-07 NOTE — PROGRESS NOTES
Spiritual Care Visit  Spiritual Care Request    Reason for Visit:  Routine Visit: Follow-up     Request Received From:       Focus of Care:  Visited With: Patient         Refer to :          Spiritual Care Assessment    Spiritual Assessment:                      Care Provided:       Sense of Community and or Gnosticist Affiliation:  Scientology   Values/Beliefs  Spiritual Requests During Hospitalization: Juan asked for a blessing today.     Addressed Needs/Concerns and/or Jesusita Through:          Outcome:        Advance Directives:         Spiritual Care Annotation    Annotation:  Juan asked for a blessing today.  Yoandy Márquez

## 2025-05-07 NOTE — ASSESSMENT & PLAN NOTE
Due to above  Hgb down trending, 9.1 today, baseline 15  Transfuse Hgb <7  Hold home aspirin  SCDs

## 2025-05-07 NOTE — DISCHARGE SUMMARY
Discharge Diagnosis  Hematuria, gross           Issues Requiring Follow-Up  No ASA and NSAIDS    Discharge Meds     Medication List      PAUSE taking these medications     aspirin 81 mg EC tablet; Wait to take this until your doctor or other   care provider tells you to start again.   metoprolol succinate  mg 24 hr tablet; Wait to take this until   your doctor or other care provider tells you to start again.; Commonly   known as: Toprol-XL; TAKE 1 TABLET (100 MG) BY MOUTH ONCE DAILY. DO NOT   CRUSH OR CHEW.   spironolactone 25 mg tablet; Wait to take this until your doctor or   other care provider tells you to start again.; Commonly known as:   Aldactone; TAKE 1 TABLET BY MOUTH EVERY DAY   triamcinolone 0.1 % cream; Wait to take this until your doctor or other   care provider tells you to start again.; Commonly known as: Kenalog; Apply   topically 2 times a day.     START taking these medications     acetaminophen 325 mg tablet; Commonly known as: Tylenol; Take 2 tablets   (650 mg) by mouth every 4 hours if needed for mild pain (1 - 3).     CONTINUE taking these medications     atorvastatin 80 mg tablet; Commonly known as: Lipitor; TAKE 1 TABLET (80   MG) BY MOUTH ONCE DAILY IN THE EVENING.   cholecalciferol 25 mcg (1,000 units) capsule; Commonly known as: Vitamin   D-3   empagliflozin 25 mg tablet; Commonly known as: Jardiance; Take 1 tablet   (25 mg) by mouth once daily.   Januvia 100 mg tablet; Generic drug: SITagliptin phosphate; TAKE 1   TABLET BY MOUTH EVERY DAY   losartan 25 mg tablet; Commonly known as: Cozaar; TAKE 1 TABLET BY MOUTH   EVERY DAY   NON FORMULARY   OneTouch Verio test strips; Generic drug: blood sugar diagnostic; FOR   TESTING ONE A DAY DX E 11.9     STOP taking these medications     amoxicillin-clavulanate 500-125 mg tablet; Commonly known as: Augmentin   azelastine 137 mcg (0.1 %) nasal spray; Commonly known as: Astelin       Test Results Pending At Discharge  Pending Labs       No  current pending labs.            Hospital Course   Juan Pineda is a 80 y.o. male hx of HTN, T2DM, BPH presenting with gross hematuria. Patient states he got laser surgery done for BPH with Dr. Gomes about 2 years ago. He had gross hematuria intermittently for about 1 year after the procedure but it resolved spontaneously around Jan, 2024. States he hasn't had any urinary issues since then until he developed sudden onset gross hematuria on Thursday, 5/1. Denies trauma to the pelvis/genitals. Hematuria continued to worsen over the next couple days with passage of blood clots as well. Presented to [facility] ED on 5/3/25 for evaluation of the hematuria. CT A/P at the time showed acute/subacute blood clot in the urinary bladder as well as incidental findings of acute jejunal diverticulitis. H&H was stable. UA was unable to be performed due to gross hematuria. Patient was discharged home a course of augmentin for diverticulitis and instructed to follow up with urology as OP. The hematuria continued to worsen over the weekend with increased urinary frequency and dysuria. Patient states he was having the urge to urinate every 15-20 minutes. He also reports some dizziness when getting up this morning which is new for him. Denies fever, chills, SOB, CP, abdominal pain, nausea, vomiting, constipation, diarrhea.      In the ED, vitals with initial BP 88/54 which then improved to 106/85 without any intervention. Labs notable for Na 136, K 4.3, Cr 1.09, WBC 12.0, Hgb 11.6, . The ED provider spoke to Dr. Gomes with urology who recommended diego placement to CBI and hospital admission.      Code status discussed with patient - FULL CODE.   Patient was seen and evaluated by urology, inserted 3-way diego for irrigation. Today it was stopped and patient was cleared for discharge by urology. Patient will need follow up blood work, no ASA and NSAID, and urology follow up.     I spent over 35 minutes in professional and overall  care of this patient.   Pertinent Physical Exam At Time of Discharge  Physical Exam  Vitals reviewed.   HENT:      Head: Normocephalic.      Nose: Nose normal.      Mouth/Throat:      Mouth: Mucous membranes are moist.   Eyes:      Extraocular Movements: Extraocular movements intact.   Cardiovascular:      Rate and Rhythm: Regular rhythm.   Pulmonary:      Effort: Pulmonary effort is normal.   Abdominal:      General: Bowel sounds are normal.   Musculoskeletal:         General: Normal range of motion.      Cervical back: Neck supple.   Skin:     General: Skin is warm.   Neurological:      Mental Status: He is alert and oriented to person, place, and time.         Outpatient Follow-Up  Future Appointments   Date Time Provider Department [place]   5/12/2025  8:00 AM [facility] PAT NURSE 01 [facility]   5/14/2025  8:30 AM CHICA Mahajan [facility]   8/5/2025  9:00 AM Kale Cazares DO [id number] [facility]   8/27/2025  9:45 AM Calli Bauer MD [id number] [facility]         LORENZO Galo-CATHI

## 2025-05-07 NOTE — PROGRESS NOTES
Anticipate discharge soon. Patient to be cleared by urology. Patient will return home with no skilled services. Will follow.      05/07/25 1301   Discharge Planning   Home or Post Acute Services None   Expected Discharge Disposition Home   Does the patient need discharge transport arranged? No

## 2025-05-07 NOTE — NURSING NOTE
Assumed care for patient at 1900. Patient is resting in bed. Patient is on continuous bladder irrigation. Night meds given. Will continue to follow plan of care.

## 2025-05-07 NOTE — CARE PLAN
The patient's goals for the shift include Decrease Hematuria, Safety    The clinical goals for the shift include maintain safety and comfort. decrease hematuria    Problem: Pain - Adult  Goal: Verbalizes/displays adequate comfort level or baseline comfort level  Outcome: Progressing     Problem: Safety - Adult  Goal: Free from fall injury  Outcome: Progressing     Problem: Discharge Planning  Goal: Discharge to home or other facility with appropriate resources  Outcome: Progressing     Problem: Chronic Conditions and Co-morbidities  Goal: Patient's chronic conditions and co-morbidity symptoms are monitored and maintained or improved  Outcome: Progressing     Problem: Nutrition  Goal: Nutrient intake appropriate for maintaining nutritional needs  Outcome: Progressing     Problem: Fall/Injury  Goal: Not fall by end of shift  Outcome: Progressing  Goal: Be free from injury by end of the shift  Outcome: Progressing  Goal: Verbalize understanding of personal risk factors for fall in the hospital  Outcome: Progressing  Goal: Verbalize understanding of risk factor reduction measures to prevent injury from fall in the home  Outcome: Progressing  Goal: Use assistive devices by end of the shift  Outcome: Progressing  Goal: Pace activities to prevent fatigue by end of the shift  Outcome: Progressing     Problem: Fall/Injury  Goal: Not fall by end of shift  Outcome: Progressing  Goal: Be free from injury by end of the shift  Outcome: Progressing  Goal: Verbalize understanding of personal risk factors for fall in the hospital  Outcome: Progressing  Goal: Verbalize understanding of risk factor reduction measures to prevent injury from fall in the home  Outcome: Progressing  Goal: Use assistive devices by end of the shift  Outcome: Progressing  Goal: Pace activities to prevent fatigue by end of the shift  Outcome: Progressing     Problem: Pain  Goal: Takes deep breaths with improved pain control throughout the shift  Outcome:  Progressing  Goal: Turns in bed with improved pain control throughout the shift  Outcome: Progressing  Goal: Walks with improved pain control throughout the shift  Outcome: Progressing  Goal: Performs ADL's with improved pain control throughout shift  Outcome: Progressing  Goal: Participates in PT with improved pain control throughout the shift  Outcome: Progressing  Goal: Free from opioid side effects throughout the shift  Outcome: Progressing  Goal: Free from acute confusion related to pain meds throughout the shift  Outcome: Progressing

## 2025-05-07 NOTE — ASSESSMENT & PLAN NOTE
Unclear etiology  CT A/P showing acute/subacute blood clot within the bladder of uncertain etiology   Urology consulted  Stopped CBI per urology  Hold home aspirin  UA ordered

## 2025-05-07 NOTE — CARE PLAN
Normal Growth and Development of School Age Children   WHAT YOU NEED TO KNOW:   Normal growth and development is how your school age child grows physically, mentally, emotionally, and socially  A school age child is 11to 15years old  DISCHARGE INSTRUCTIONS:   Physical changes:   · Your child may be 43 inches tall and weigh about 43 pounds at the start of the school age years  As puberty starts, your child's height and weight will increase quickly  Your child may reach 59 inches and weigh about 90 pounds by age 15     · Your child's bones, muscles, and fat continue to grow during this time  These changes may happen faster as your child approaches puberty  Puberty may start as early as 9years of age in girls and 5years of age in boys  · Your child's strength, balance, and coordination improves  Your child may start to participate in sports  Emotional and social changes:   · Acceptance becomes important to your child  Your child may start to be influenced more by friends than family  He may feel like he needs to keep up with other kids and belong to a group  Friends can be a source of support during these years  · Your child may be eager to learn new things on his own at school  He learns to get along with more people and understand social customs  Mental changes:   · Your child may develop fears of the unknown  He may be afraid of the dark  He may start to understand more about the world and may fear robbers, injuries, or death  · Your child will begin to think logically  He will be able to make sense of what is happening around him  His ability to understand ideas and his memory improve  He is able to follow complex directions and rules and to solve problems  · Your child can name numbers and letters easily  He will start to read  His vocabulary and ability to pronounce words improves significantly  Help your child develop:   · Help your child get enough sleep    He needs 10 to 11 hours The patient's goals for the shift include Decrease Hematuria, Safety    The clinical goals for the shift include Decreased Hematuria      Problem: Pain - Adult  Goal: Verbalizes/displays adequate comfort level or baseline comfort level  Outcome: Progressing     Problem: Safety - Adult  Goal: Free from fall injury  Outcome: Progressing     Problem: Discharge Planning  Goal: Discharge to home or other facility with appropriate resources  Outcome: Progressing     Problem: Chronic Conditions and Co-morbidities  Goal: Patient's chronic conditions and co-morbidity symptoms are monitored and maintained or improved  Outcome: Progressing     Problem: Nutrition  Goal: Nutrient intake appropriate for maintaining nutritional needs  Outcome: Progressing      each day  Set up a routine at bedtime  Make sure his room is cool and dark  Do not give him caffeine late in the day  · Give your child a variety of healthy foods each day  This includes fruit, vegetables, and protein, such as chicken, fish, and beans  Limit foods that are high in fat and sugar  Make sure he eats breakfast to give him energy for the day  Have your child sit with the family at mealtime, even if he does not want to eat  · Get involved in your child's activities  Stay in contact with his teachers  Get to know his friends  Spend time with him and be there for him  · Encourage at least 1 hour of exercise every day  Exercises improves his strength and helps maintain a healthy weight  · Set clear rules and be consistent  Set limits for your child  Praise and reward him when he does something positive  Do not criticize or show disapproval when your child has done something wrong  Instead, explain what you would like him to do and tell him why  · Encourage your child to try different creative activities  These may include working on a hobby or art project, or playing a musical instrument  Do not force a particular hobby on him  Let him discover his interest at his own pace  All activities should be appropriate for your child's age  © 2017 [facility] Information is for End User's use only and may not be sold, redistributed or otherwise used for commercial purposes  All illustrations and images included in CareNotes® are the copyrighted property of [employer]  or [employer]  The above information is an  only  It is not intended as medical advice for individual conditions or treatments  Talk to your doctor, nurse or pharmacist before following any medical regimen to see if it is safe and effective for you

## 2025-05-08 ENCOUNTER — PATIENT OUTREACH (OUTPATIENT)
Dept: PRIMARY CARE | Facility: CLINIC | Age: 80
End: 2025-05-08
Payer: MEDICARE

## 2025-05-08 NOTE — PROGRESS NOTES
TCM completed 05/08/25   Discharge Facility: [facility]  Discharge Diagnosis: Hematuria, gross   Admission Date: 5/5/25  Discharge Date: 5/7/25    PCP Appointment Date: Patient declined to schedule- prefers to call back at a later date/time.        (Needs seen by: 5/20)  Specialist Appointment Date:  Urology- Monday 5/12/25    Hospital Encounter and Summary Linked: Yes  ED to Hosp-Admission (Discharged) with Poornima Mendoza MD; Audrey Fonseca DO (05/05/2025)                          --See discharge assessment below for further details--      Wrap Up  Wrap Up Additional Comments: TCM initial outreach post discharge completed successfully. Spoke with the patient who states he is doing okay today, still bleeding, but was able to get an appt with Urology for this upcoming Monday. Patient also reports a high urinary output and states he left a message w/urology to see if he could have a catheter placed as soon as possible to help lessen his trips to the bathroom. Patient denied any acute changes/concerns in his condition since leaving the hospital. Patient denied needing any assistance in the home.  Patient confirmed he received his discharge summary and has all medications needed in the home. Patient denied any questions regarding discharge instructions, medication changes or his hospital stay. Patient denied need for DME, HHC or assistance obtaining transportation. TCM phone number was provided to the patient, with the patient encouraged to call back with any non-emergent questions or concerns. Patient verbalized his understanding and states he has no questions or concerns at this time, but will call back if needed. Patient declined to schedule a PCP follow up appt during outreach, stating he would prefer to get his urology situation handled and will call back to schedule a PCP appt. afterwards. (5/8/2025  1:00 PM)  Call End Time: 1258 (5/8/2025  1:00 PM)    Engagement  Call Start Time: 1255 (5/8/2025  1:00  PM)    Medications  Medications reviewed with patient/caregiver?: Yes (5/8/2025  1:00 PM)  Is the patient having any side effects they believe may be caused by any medication additions or changes?: No (5/8/2025  1:00 PM)  Does the patient have all medications ordered at discharge?: Yes (5/8/2025  1:00 PM)  Care Management Interventions: No intervention needed; Provided patient education (5/8/2025  1:00 PM)  Prescription Comments: PAUSE taking these medications:     Aspirin 81 mg EC tablet; Wait to take this until your doctor or other   care provider tells you to start again.   Metoprolol succinate  mg 24 hr tablet; Wait to take this until   your doctor or other care provider tells you to start again.; Commonly   known as: Toprol-XL; TAKE 1 TABLET (100 MG) BY MOUTH ONCE DAILY. DO NOT CRUSH OR CHEW.   Spironolactone 25 mg tablet; Wait to take this until your doctor or   other care provider tells you to start again.; Commonly known as:   Aldactone; TAKE 1 TABLET BY MOUTH EVERY DAY   Triamcinolone 0.1 % cream; Wait to take this until your doctor or other   care provider tells you to start again.; Commonly known as: Kenalog; Apply topically 2 times a day.     START taking these medications:     Acetaminophen 325 mg tablet; Commonly known as: Tylenol; Take 2 tablets (650 mg) by mouth every 4 hours if needed for mild pain (1 - 3).    STOP taking these medications:     Amoxicillin-clavulanate 500-125 mg tablet; Commonly known as: Augmentin   Azelastine 137 mcg (0.1 %) nasal spray; Commonly known as: Astelin. (5/8/2025  1:00 PM)  Is the patient taking all medications as directed (includes completed medication regime)?: Yes (5/8/2025  1:00 PM)  Care Management Interventions: Provided patient education (5/8/2025  1:00 PM)  Medication Comments: No new medications. (5/8/2025  1:00 PM)    Appointments  Does the patient have a primary care provider?: Yes (5/8/2025  1:00 PM)  Care Management Interventions: Educated patient on  importance of making appointment; Advised patient to make appointment (5/8/2025  1:00 PM)  Has the patient kept scheduled appointments due by today?: Not applicable (5/8/2025  1:00 PM)  Care Management Interventions: Advised to reschedule appointment (5/8/2025  1:00 PM)    Self Management  What is the home health agency?: N/A (5/8/2025  1:00 PM)  Has home health visited the patient within 72 hours of discharge?: Not applicable (5/8/2025  1:00 PM)  What Durable Medical Equipment (DME) was ordered?: N/A (5/8/2025  1:00 PM)    Patient Teaching  Does the patient have access to their discharge instructions?: Yes (5/8/2025  1:00 PM)  Care Management Interventions: Reviewed instructions with patient (5/8/2025  1:00 PM)  What is the patient's perception of their health status since discharge?: Same (5/8/2025  1:00 PM)  Is the patient/caregiver able to teach back the hierarchy of who to call/visit for symptoms/problems? PCP, Specialist, Home Health nurse, Urgent Care, ED, 911: Yes (5/8/2025  1:00 PM)  Patient/Caregiver Education Comments: Patient denied any questions, concerns or needs from TCM or his PCP at time of outreach. Reminded patient: No ASA and NSAIDS and repeat labs are expected by 5/12/25. (5/8/2025  1:00 PM)

## 2025-05-12 ENCOUNTER — APPOINTMENT (OUTPATIENT)
Dept: PREADMISSION TESTING | Facility: HOSPITAL | Age: 80
End: 2025-05-12
Payer: MEDICARE

## 2025-05-12 ENCOUNTER — APPOINTMENT (OUTPATIENT)
Dept: UROLOGY | Facility: CLINIC | Age: 80
End: 2025-05-12
Payer: MEDICARE

## 2025-05-12 ENCOUNTER — HOSPITAL ENCOUNTER (EMERGENCY)
Facility: HOSPITAL | Age: 80
Discharge: HOME | End: 2025-05-12
Payer: MEDICARE

## 2025-05-12 VITALS
SYSTOLIC BLOOD PRESSURE: 122 MMHG | HEART RATE: 98 BPM | WEIGHT: 200 LBS | TEMPERATURE: 97.7 F | BODY MASS INDEX: 28 KG/M2 | DIASTOLIC BLOOD PRESSURE: 70 MMHG | RESPIRATION RATE: 16 BRPM | HEIGHT: 71 IN | OXYGEN SATURATION: 99 %

## 2025-05-12 VITALS — TEMPERATURE: 97.3 F

## 2025-05-12 DIAGNOSIS — R31.0 HEMATURIA, GROSS: ICD-10-CM

## 2025-05-12 DIAGNOSIS — R31.9 HEMATURIA, UNSPECIFIED TYPE: ICD-10-CM

## 2025-05-12 DIAGNOSIS — R33.9 URINARY RETENTION: Primary | ICD-10-CM

## 2025-05-12 LAB
POC APPEARANCE, URINE: ABNORMAL
POC BILIRUBIN, URINE: ABNORMAL
POC BLOOD, URINE: ABNORMAL
POC COLOR, URINE: ABNORMAL
POC GLUCOSE, URINE: ABNORMAL MG/DL
POC KETONES, URINE: ABNORMAL MG/DL
POC LEUKOCYTES, URINE: NEGATIVE
POC NITRITE,URINE: NEGATIVE
POC PH, URINE: 5.5 PH
POC PROTEIN, URINE: ABNORMAL MG/DL
POC SPECIFIC GRAVITY, URINE: 1.01
POC UROBILINOGEN, URINE: 0.2 EU/DL

## 2025-05-12 PROCEDURE — 1126F AMNT PAIN NOTED NONE PRSNT: CPT | Performed by: STUDENT IN AN ORGANIZED HEALTH CARE EDUCATION/TRAINING PROGRAM

## 2025-05-12 PROCEDURE — 1111F DSCHRG MED/CURRENT MED MERGE: CPT | Performed by: STUDENT IN AN ORGANIZED HEALTH CARE EDUCATION/TRAINING PROGRAM

## 2025-05-12 PROCEDURE — 81003 URINALYSIS AUTO W/O SCOPE: CPT | Performed by: STUDENT IN AN ORGANIZED HEALTH CARE EDUCATION/TRAINING PROGRAM

## 2025-05-12 PROCEDURE — 99204 OFFICE O/P NEW MOD 45 MIN: CPT | Performed by: STUDENT IN AN ORGANIZED HEALTH CARE EDUCATION/TRAINING PROGRAM

## 2025-05-12 PROCEDURE — 99281 EMR DPT VST MAYX REQ PHY/QHP: CPT

## 2025-05-12 PROCEDURE — 1160F RVW MEDS BY RX/DR IN RCRD: CPT | Performed by: STUDENT IN AN ORGANIZED HEALTH CARE EDUCATION/TRAINING PROGRAM

## 2025-05-12 PROCEDURE — 1159F MED LIST DOCD IN RCRD: CPT | Performed by: STUDENT IN AN ORGANIZED HEALTH CARE EDUCATION/TRAINING PROGRAM

## 2025-05-12 ASSESSMENT — PAIN - FUNCTIONAL ASSESSMENT: PAIN_FUNCTIONAL_ASSESSMENT: 0-10

## 2025-05-12 ASSESSMENT — LIFESTYLE VARIABLES
EVER FELT BAD OR GUILTY ABOUT YOUR DRINKING: NO
TOTAL SCORE: 0
EVER HAD A DRINK FIRST THING IN THE MORNING TO STEADY YOUR NERVES TO GET RID OF A HANGOVER: NO
HAVE YOU EVER FELT YOU SHOULD CUT DOWN ON YOUR DRINKING: NO
HAVE PEOPLE ANNOYED YOU BY CRITICIZING YOUR DRINKING: NO

## 2025-05-12 ASSESSMENT — PAIN SCALES - GENERAL
PAINLEVEL_OUTOF10: 0-NO PAIN
PAINLEVEL_OUTOF10: 5 - MODERATE PAIN

## 2025-05-12 NOTE — ED TRIAGE NOTES
Pt states he had a catheter placed this morning and believes its blocked. Pt states catheter is not draining anything.

## 2025-05-12 NOTE — DISCHARGE INSTRUCTIONS
Please return to the emergency room immediately if new or worsening symptoms occur. Symptoms which are concerning are bloody stool, fever, changing or worsening pain, or intractable vomiting. These necessitate immediate return to the emergency department.    Thank you for allowing us to take care of you today. While you are home, you might receive a survey about your care in our hospital. Your nurse and myself, Abhay Vallecillo CNP, would love your honest feedback on your care. Your feedback and especially your positive comments help our hospital receive the support we need to continue to serve you and your family. Thank you again for trusting us with your care.

## 2025-05-12 NOTE — ED PROVIDER NOTES
HPI   Chief Complaint   Patient presents with    diego problem       HPI  See my MDM      Patient History   Medical History[1]  Surgical History[2]  Family History[3]  Social History[4]    Physical Exam   ED Triage Vitals [05/12/25 1544]   Temperature Heart Rate Respirations BP   36.5 °C (97.7 °F) (!) 123 18 137/70      Pulse Ox Temp Source Heart Rate Source Patient Position   97 % Tympanic Monitor Sitting      BP Location FiO2 (%)     Left arm --       Physical Exam  CONSTITUTIONAL: Vital signs reviewed as charted, well-developed and in no distress  Eyes: Extraocular muscles are intact. Pupils equal round and reactive to light. Conjunctiva are pink.    ENT: Mucous membranes are moist. Tongue in the midline. Pharynx was without erythema or exudates, uvula midline  LUNGS: Breath sounds equal and clear to auscultation. Good air exchange, no wheezes rales or retractions, pulse oximetry is charted.  HEART: Regular rate and rhythm without murmur thrill or rub, strong tones, auscultation is normal.  ABDOMEN: Soft and nontender without guarding rebound rigidity or mass. Bowel sounds are present and normal in all quadrants. There is no palpable masses or aneurysms identified. No hepatosplenomegaly, normal abdominal exam.  Neuro: The patient is awake, alert and oriented ×3. Moving all 4 extremities and answering questions appropriately.   MUSCULOSKELETAL: The calves are nontender to palpation. Full gross active range of motion.   PSYCH: Awake alert oriented, normal mood and affect.  Skin:  Dry, normal color, warm to the touch, no rash present.        ED Course & MDM   Diagnoses as of 05/12/25 1737   Urinary retention                 No data recorded     [place] Coma Scale Score: 15 (05/12/25 1733 : Silvia Suggs RN)                           Medical Decision Making  History obtained from: patient    Vital signs, nursing notes, current medications, past medical history, Surgical history, allergies, social history, family  History were reviewed.         HPI:  Patient is AN 80-year-old male presenting emergency room today after having a Welsh catheter inserted this morning.  States it is no longer draining.Denies fevers chills or night sweats.  He does admit to lower abdominal pain that is intense.  He had a Welsh placed to the urologist office today as he has been having hematuria for about 10 days.  He was admitted to the hospital for this.  This was not his normal urologist he was seen today.  He does have an appoint with him tomorrow for cystoscopy.      10 point ROS was reviewed and negative except Noted above in HPI.  DDX: as listed above          MDM Summary/considerations:  Labs Reviewed - No data to display  No orders to display     Medications - No data to display  Discharge Medication List as of 5/12/2025  5:17 PM          Nursing staff did attempt to flush the patient's Welsh patient stated he did not want to flush she just wanted to removed and he will go home until tomorrow.  He states he does not want another Welsh catheter placed.  He was very concerned if he replaced his Welsh that it would clot off again and he would have to come back to the ER.  Was discharged home stable condition will follow with Dr. Byrne tomorrow.    All of the patient's questions were answered to the best of my ability.  Patient states understanding that they have been screened for an emergency today and we have not found any etiology of symptoms that requires emergent treatment or admission to the hospital at this point. They understand that they have not had definitive care day and require follow-up for treatment of their condition. They also state understanding that they may have an emergent condition that may potentially have not of detected at this visit and they must return to the emergency department if they develop any worsening of symptoms or new complaints.      I have evaluated this patient, my supervising physician was available for  consultation.        Critical Care: Not warranted at this time        This chart was completed using voice recognition transcription software. Please excuse any errors of transcription including grammatical, punctuation, syntax and spelling errors.  Please contact me with any questions regarding this chart.      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    Other conditions influencing health status 06/08/2015    History of cough    Other conditions influencing health status 03/12/2019    Diabetes mellitus type II, uncontrolled    Pain, unspecified 08/09/2016    Acute pain    Personal history of diseases of the skin and subcutaneous tissue 12/04/2014    History of seborrheic keratosis    Personal history of other diseases of male genital organs     History of benign prostatic hypertrophy    Personal history of other diseases of the musculoskeletal system and connective tissue     History of arthritis    Prediabetes 12/09/2015    Pre-diabetes    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    Unspecified cataract     Cataract of both eyes    Unspecified injury of unspecified lower leg, initial encounter     Knee injury    Urinary retention 06/13/2023   [2]   Past Surgical History:  Procedure Laterality Date    CATARACT EXTRACTION  02/18/2015    Cataract Surgery    COLONOSCOPY  09/09/2013    Complete  Colonoscopy    HERNIA REPAIR  09/09/2013    Inguinal Hernia Repair    KNEE ARTHROSCOPY W/ DEBRIDEMENT  09/09/2013    Knee Arthroscopy (Therapeutic)    OTHER SURGICAL HISTORY  10/09/2020    Knee replacement    ROTATOR CUFF REPAIR  09/09/2013    Rotator Cuff Repair   [3] No family history on file.  [4]   Social History  Tobacco Use    Smoking status: Never    Smokeless tobacco: Never   Substance Use Topics    Alcohol use: Never    Drug use: Never        LORENZO Christian-CNP  05/12/25 1732

## 2025-05-13 ENCOUNTER — TELEPHONE (OUTPATIENT)
Dept: PREADMISSION TESTING | Facility: HOSPITAL | Age: 80
End: 2025-05-13

## 2025-05-13 ENCOUNTER — PROCEDURE VISIT (OUTPATIENT)
Dept: UROLOGY | Facility: CLINIC | Age: 80
End: 2025-05-13
Payer: MEDICARE

## 2025-05-13 VITALS
HEART RATE: 128 BPM | HEIGHT: 71 IN | TEMPERATURE: 97.8 F | DIASTOLIC BLOOD PRESSURE: 74 MMHG | WEIGHT: 200 LBS | BODY MASS INDEX: 28 KG/M2 | SYSTOLIC BLOOD PRESSURE: 157 MMHG

## 2025-05-13 DIAGNOSIS — R31.0 GROSS HEMATURIA: Primary | ICD-10-CM

## 2025-05-13 PROCEDURE — 52000 CYSTOURETHROSCOPY: CPT | Performed by: SURGERY

## 2025-05-13 ASSESSMENT — PAIN SCALES - GENERAL: PAINLEVEL_OUTOF10: 0-NO PAIN

## 2025-05-13 NOTE — PROGRESS NOTES
"Patient ID: Gary Pineda \"Donte" is a 80 y.o. male.  He is here for hematuria workup.  He has longstanding BPH, had a prior laser PVP.  He had a CT urogram last year which was unremarkable.  His prostate size on imaging is approximately 240 g.      Cystoscopy    Date/Time: 5/13/2025 2:00 PM    Performed by: Faustino Gomes MD  Authorized by: Faustino Gomes MD    Procedure - Bladder Cystoscopy:     Procedure details: cystoscopy    Post-procedure:     Patient tolerance: Patient tolerated the procedure well with no immediate complications      Comments:      The patient was placed in a supine position.  His genitalia were prepped and draped.  We introduced viscous lidocaine per urethra.  A cystoscopy was passed per urethra, and we entered the bladder.  The orifices were identified and appeared normal with clear efflux.  The bladder mucosa was inspected.  No tumors or stones seen.  Small residual clots.  Prostate is enlarged, severely obstructive.  Large intra-vesical median lobe.  No strictures seen.        Plan:  Will refer to Dr. Garcia for robotic simple prostatectomy.           "

## 2025-05-14 ENCOUNTER — APPOINTMENT (OUTPATIENT)
Dept: PREADMISSION TESTING | Facility: HOSPITAL | Age: 80
End: 2025-05-14
Payer: MEDICARE

## 2025-05-14 ENCOUNTER — OFFICE VISIT (OUTPATIENT)
Dept: UROLOGY | Facility: CLINIC | Age: 80
End: 2025-05-14
Payer: MEDICARE

## 2025-05-14 DIAGNOSIS — N13.8 BPH WITH URINARY OBSTRUCTION: ICD-10-CM

## 2025-05-14 DIAGNOSIS — N40.1 BENIGN PROSTATIC HYPERPLASIA WITH LOWER URINARY TRACT SYMPTOMS, SYMPTOM DETAILS UNSPECIFIED: Primary | ICD-10-CM

## 2025-05-14 DIAGNOSIS — N40.1 BPH WITH URINARY OBSTRUCTION: ICD-10-CM

## 2025-05-14 DIAGNOSIS — R39.9 LOWER URINARY TRACT SYMPTOMS (LUTS): ICD-10-CM

## 2025-05-14 DIAGNOSIS — R31.0 GROSS HEMATURIA: ICD-10-CM

## 2025-05-14 PROCEDURE — 1111F DSCHRG MED/CURRENT MED MERGE: CPT | Performed by: STUDENT IN AN ORGANIZED HEALTH CARE EDUCATION/TRAINING PROGRAM

## 2025-05-14 PROCEDURE — 99215 OFFICE O/P EST HI 40 MIN: CPT | Performed by: STUDENT IN AN ORGANIZED HEALTH CARE EDUCATION/TRAINING PROGRAM

## 2025-05-14 PROCEDURE — G2211 COMPLEX E/M VISIT ADD ON: HCPCS | Performed by: STUDENT IN AN ORGANIZED HEALTH CARE EDUCATION/TRAINING PROGRAM

## 2025-05-14 RX ORDER — ALVIMOPAN 12 MG/1
12 CAPSULE ORAL ONCE
OUTPATIENT
Start: 2025-05-14 | End: 2025-05-14

## 2025-05-14 RX ORDER — ACETAMINOPHEN 325 MG/1
975 TABLET ORAL ONCE
OUTPATIENT
Start: 2025-05-14 | End: 2025-05-14

## 2025-05-14 RX ORDER — CEFAZOLIN SODIUM 2 G/100ML
2 INJECTION, SOLUTION INTRAVENOUS ONCE
OUTPATIENT
Start: 2025-05-14 | End: 2025-05-14

## 2025-05-14 RX ORDER — GABAPENTIN 300 MG/1
300 CAPSULE ORAL ONCE
OUTPATIENT
Start: 2025-05-14 | End: 2025-05-14

## 2025-05-14 NOTE — PROGRESS NOTES
"Subjective   Patient ID: Gary Pineda \"Juan\" is a 80 y.o. male who presents for hematuria. Referred by Dr. Gomes.   HPI  80 y.o male presenting for BPH with LUTs and hematuria. S/p Greenlight PVP with Dr. Gomes in 2023. Developed intermittent hematuria after.     Since his procedure he has had incontinence and urgency. His flow now improved but he has an increased frequency. His most recent recurrence of hematuria he noticed clots.     CT A&P (05/03/25)   Noted significant prostatomegaly; 274g prostate.   Noted moderate clot burden within his bladder.   Solitary left kidney    CTU (01/25/24)   Solitary left kidney  No stones or hydronephrosis bilaterally.   Numerous bladder diverticula   No filling defects aside from enlarged prostate with intravesical protrusion.   +Prostatomegaly.     Review of Systems    Objective   Physical Exam  General: Well developed, well nourished, alert and cooperative, appears in no acute distress   Eyes: Non-injected conjunctiva, sclera clear, no proptosis   Cardiac: Extremities are warm and well perfused. No edema, cyanosis or pallor.   Lungs: Breathing is easy, non-labored. Speaking in clear and complete sentences. Normal diaphragmatic movement.   Abdomen: soft, non-tender   MSK: Ambulatory with steady gait, unassisted   Neuro: alert and oriented to person, place and time   Psych: Demonstrates good judgement and reason, without hallucinations, abnormal affect or abnormal behaviors.   Skin: no obvious lesions, no rashes.   : phallus normal in size, no plaques or lesions. Testicles normal in size and texture, no masses       Assessment/Plan   Diagnoses and all orders for this visit:  Benign prostatic hyperplasia with lower urinary tract symptoms, symptom details unspecified  -     FL cystogram 3V; Future  -     Request for Pre-Admission Testing Visit; Future  -     Urinalysis with Reflex Culture and Microscopic; Future  -     Urine Culture; Future  -     Basic Metabolic Panel; " Future  -     CBC; Future  BPH with urinary obstruction  -     Welsh Catheter Removal; Future  -     Case Request Operating Room: PROSTATECTOMY, ROBOT-ASSISTED; Standing  Lower urinary tract symptoms (LUTS)  -     Urinalysis with Reflex Culture and Microscopic; Future  -     Urine Culture; Future  Gross hematuria  -     CBC; Future  Other orders  -     NPO Diet Except: Sips with meds; Effective now; Standing  -     Height and weight; Standing  -     Insert and maintain peripheral IV; Standing  -     Saline lock IV; Standing  -     POCT Glucose; Standing  -     Type And Screen; Standing  -     Inpatient consult to Respiratory Care; Standing  -     Place in outpatient/hospital ambulatory surgery; Standing  -     Full code; Standing  -     ceFAZolin (Ancef) 2 g in dextrose (iso)  mL  -     acetaminophen (Tylenol) tablet 975 mg  -     gabapentin (Neurontin) capsule 300 mg  -     alvimopan (Entereg) capsule 12 mg      80 y.o male with a history of BPH with LUTs and gross hematuria.     I discussed with the patient the findings on CT A/p, and explained his options.   Because of the size of his prostate over 100g, in his case 200 g, in my practice, robotic simple prostatectomy is considered the procedure of choice for him.  I explained the procedure in detail, the robotic approach and the access to the prostate by incising the bladder and enucleating the prostate between the capsule and the prostate adenoma, followed by control of bleeding and repair of bladder necessitating a Welsh catheter for bladder decompression ensuring healing.  I also explained the perioperative care, the hospital stay, the catheter time, and the recovery process.  I also reviewed the potential complications including bleeding and infection, the risk of urinary leakage if bladder healing is delayed, risk of transient urinary incontinence, and possible stricture in the future at the level of the prostate apex. We reviewed the benefits of the  procedure relieving the obstruction, and delaying the progressing of bladder damage.     We reviewed the single-port versus the multi-port procedure and the advantages of the SP platform, in terms of supine position, no peritoneal insufflation, and faster recovery overall.    The patient is interested in the SP simple-prostatectomy and would like to proceed.     Plan:  UA + culture today  Single-port RASP on 5/30 in [facility]    I spent 40 min on direct patient care including time to review the medical records, face to face time with the patient, coordinating care and documentation.      Scribe Attestation  By signing my name below, I, Jessica Lr   attest that this documentation has been prepared under the direction and in the presence of Samaria Garcia MD.

## 2025-05-15 ENCOUNTER — LAB (OUTPATIENT)
Dept: LAB | Facility: HOSPITAL | Age: 80
End: 2025-05-15
Payer: MEDICARE

## 2025-05-15 LAB
ANION GAP SERPL CALCULATED.3IONS-SCNC: 10 MMOL/L (ref 10–20)
APPEARANCE UR: ABNORMAL
BILIRUB UR STRIP.AUTO-MCNC: NEGATIVE MG/DL
BUN SERPL-MCNC: 15 MG/DL (ref 6–23)
CALCIUM SERPL-MCNC: 8.7 MG/DL (ref 8.6–10.3)
CHLORIDE SERPL-SCNC: 106 MMOL/L (ref 98–107)
CO2 SERPL-SCNC: 27 MMOL/L (ref 21–32)
COLOR UR: ABNORMAL
CREAT SERPL-MCNC: 0.85 MG/DL (ref 0.5–1.3)
EGFRCR SERPLBLD CKD-EPI 2021: 87 ML/MIN/1.73M*2
ERYTHROCYTE [DISTWIDTH] IN BLOOD BY AUTOMATED COUNT: 14.7 % (ref 11.5–14.5)
GLUCOSE SERPL-MCNC: 147 MG/DL (ref 74–99)
GLUCOSE UR STRIP.AUTO-MCNC: ABNORMAL MG/DL
HCT VFR BLD AUTO: 29.2 % (ref 41–52)
HGB BLD-MCNC: 9.2 G/DL (ref 13.5–17.5)
HOLD SPECIMEN: NORMAL
KETONES UR STRIP.AUTO-MCNC: NEGATIVE MG/DL
LEUKOCYTE ESTERASE UR QL STRIP.AUTO: ABNORMAL
MCH RBC QN AUTO: 30.4 PG (ref 26–34)
MCHC RBC AUTO-ENTMCNC: 31.5 G/DL (ref 32–36)
MCV RBC AUTO: 96 FL (ref 80–100)
NITRITE UR QL STRIP.AUTO: ABNORMAL
NRBC BLD-RTO: 0 /100 WBCS (ref 0–0)
PH UR STRIP.AUTO: 5.5 [PH]
PLATELET # BLD AUTO: 389 X10*3/UL (ref 150–450)
POTASSIUM SERPL-SCNC: 5 MMOL/L (ref 3.5–5.3)
PROT UR STRIP.AUTO-MCNC: ABNORMAL MG/DL
RBC # BLD AUTO: 3.03 X10*6/UL (ref 4.5–5.9)
RBC # UR STRIP.AUTO: ABNORMAL MG/DL
RBC #/AREA URNS AUTO: >20 /HPF
SODIUM SERPL-SCNC: 138 MMOL/L (ref 136–145)
SP GR UR STRIP.AUTO: 1.02
UROBILINOGEN UR STRIP.AUTO-MCNC: NORMAL MG/DL
WBC # BLD AUTO: 10.8 X10*3/UL (ref 4.4–11.3)
WBC #/AREA URNS AUTO: >50 /HPF
WBC CLUMPS #/AREA URNS AUTO: ABNORMAL /HPF

## 2025-05-15 PROCEDURE — 80048 BASIC METABOLIC PNL TOTAL CA: CPT

## 2025-05-15 PROCEDURE — 85027 COMPLETE CBC AUTOMATED: CPT

## 2025-05-15 PROCEDURE — 81001 URINALYSIS AUTO W/SCOPE: CPT

## 2025-05-15 PROCEDURE — 87186 SC STD MICRODIL/AGAR DIL: CPT

## 2025-05-15 PROCEDURE — 87086 URINE CULTURE/COLONY COUNT: CPT

## 2025-05-16 ENCOUNTER — OFFICE VISIT (OUTPATIENT)
Dept: PRIMARY CARE | Facility: CLINIC | Age: 80
End: 2025-05-16
Payer: MEDICARE

## 2025-05-16 VITALS
SYSTOLIC BLOOD PRESSURE: 128 MMHG | HEART RATE: 82 BPM | DIASTOLIC BLOOD PRESSURE: 68 MMHG | HEIGHT: 71 IN | OXYGEN SATURATION: 97 % | WEIGHT: 194 LBS | BODY MASS INDEX: 27.16 KG/M2

## 2025-05-16 DIAGNOSIS — D64.9 ANEMIA, UNSPECIFIED TYPE: ICD-10-CM

## 2025-05-16 DIAGNOSIS — N30.00 ACUTE CYSTITIS WITHOUT HEMATURIA: ICD-10-CM

## 2025-05-16 DIAGNOSIS — R31.0 HEMATURIA, GROSS: Primary | ICD-10-CM

## 2025-05-16 LAB
ANION GAP SERPL CALCULATED.4IONS-SCNC: 10 MMOL/L (CALC) (ref 7–17)
APPEARANCE UR: ABNORMAL
BACTERIA #/AREA URNS HPF: ABNORMAL /HPF
BACTERIA UR CULT: ABNORMAL
BILIRUB UR QL STRIP: NEGATIVE
BUN SERPL-MCNC: 15 MG/DL (ref 7–25)
BUN/CREAT SERPL: ABNORMAL (CALC) (ref 6–22)
CALCIUM SERPL-MCNC: 8.8 MG/DL (ref 8.6–10.3)
CHLORIDE SERPL-SCNC: 106 MMOL/L (ref 98–110)
CO2 SERPL-SCNC: 23 MMOL/L (ref 20–32)
COLOR UR: YELLOW
CREAT SERPL-MCNC: 0.88 MG/DL (ref 0.7–1.22)
EGFRCR SERPLBLD CKD-EPI 2021: 86 ML/MIN/1.73M2
ERYTHROCYTE [DISTWIDTH] IN BLOOD BY AUTOMATED COUNT: 13.2 % (ref 11–15)
GLUCOSE SERPL-MCNC: 144 MG/DL (ref 65–99)
GLUCOSE UR QL STRIP: ABNORMAL
HCT VFR BLD AUTO: 29.4 % (ref 38.5–50)
HGB BLD-MCNC: 9.3 G/DL (ref 13.2–17.1)
HGB UR QL STRIP: ABNORMAL
HYALINE CASTS #/AREA URNS LPF: ABNORMAL /LPF
KETONES UR QL STRIP: NEGATIVE
LEUKOCYTE ESTERASE UR QL STRIP: ABNORMAL
MCH RBC QN AUTO: 30.8 PG (ref 27–33)
MCHC RBC AUTO-ENTMCNC: 31.6 G/DL (ref 32–36)
MCV RBC AUTO: 97.4 FL (ref 80–100)
NITRITE UR QL STRIP: POSITIVE
PH UR STRIP: 5.5 [PH] (ref 5–8)
PLATELET # BLD AUTO: 408 THOUSAND/UL (ref 140–400)
PMV BLD REES-ECKER: 9.2 FL (ref 7.5–12.5)
POTASSIUM SERPL-SCNC: 5.1 MMOL/L (ref 3.5–5.3)
PROT UR QL STRIP: ABNORMAL
RBC # BLD AUTO: 3.02 MILLION/UL (ref 4.2–5.8)
RBC #/AREA URNS HPF: ABNORMAL /HPF
SERVICE CMNT-IMP: ABNORMAL
SODIUM SERPL-SCNC: 139 MMOL/L (ref 135–146)
SP GR UR STRIP: 1.03 (ref 1–1.03)
SQUAMOUS #/AREA URNS HPF: ABNORMAL /HPF
WBC # BLD AUTO: 10.3 THOUSAND/UL (ref 3.8–10.8)
WBC #/AREA URNS HPF: ABNORMAL /HPF

## 2025-05-16 PROCEDURE — 3078F DIAST BP <80 MM HG: CPT | Performed by: FAMILY MEDICINE

## 2025-05-16 PROCEDURE — 1111F DSCHRG MED/CURRENT MED MERGE: CPT | Performed by: FAMILY MEDICINE

## 2025-05-16 PROCEDURE — 1160F RVW MEDS BY RX/DR IN RCRD: CPT | Performed by: FAMILY MEDICINE

## 2025-05-16 PROCEDURE — 1159F MED LIST DOCD IN RCRD: CPT | Performed by: FAMILY MEDICINE

## 2025-05-16 PROCEDURE — 99495 TRANSJ CARE MGMT MOD F2F 14D: CPT | Performed by: FAMILY MEDICINE

## 2025-05-16 PROCEDURE — 3074F SYST BP LT 130 MM HG: CPT | Performed by: FAMILY MEDICINE

## 2025-05-16 RX ORDER — SULFAMETHOXAZOLE AND TRIMETHOPRIM 800; 160 MG/1; MG/1
1 TABLET ORAL 2 TIMES DAILY
Qty: 14 TABLET | Refills: 0 | Status: SHIPPED | OUTPATIENT
Start: 2025-05-16 | End: 2025-05-23

## 2025-05-16 RX ORDER — SULFAMETHOXAZOLE AND TRIMETHOPRIM 800; 160 MG/1; MG/1
1 TABLET ORAL 2 TIMES DAILY
Qty: 14 TABLET | Refills: 0 | Status: SHIPPED | OUTPATIENT
Start: 2025-05-16 | End: 2025-05-16 | Stop reason: ALTCHOICE

## 2025-05-16 ASSESSMENT — ENCOUNTER SYMPTOMS
DEPRESSION: 0
OCCASIONAL FEELINGS OF UNSTEADINESS: 0
LOSS OF SENSATION IN FEET: 0

## 2025-05-16 ASSESSMENT — PATIENT HEALTH QUESTIONNAIRE - PHQ9
2. FEELING DOWN, DEPRESSED OR HOPELESS: NOT AT ALL
1. LITTLE INTEREST OR PLEASURE IN DOING THINGS: NOT AT ALL
SUM OF ALL RESPONSES TO PHQ9 QUESTIONS 1 & 2: 0

## 2025-05-16 NOTE — PROGRESS NOTES
"Patient: Gary Pineda \"Juan\"  : 1945  PCP: Kale Cazares DO  MRN: [id number]  Program: Transitional Care Management  Status: Enrolled  Effective Dates: 2025 - present  Responsible Staff: Ivelisse Glover  Social Drivers to be Addressed: Physical Activity         Gary Pineda \"Juan\" is a 80 y.o. male presenting today for follow-up after being discharged from the hospital 9 days ago. The main problem requiring admission was Gross Hematuria. The discharge summary and/or Transitional Care Management documentation was reviewed. Medication reconciliation was performed as indicated via the \"Slade as Reviewed\" timestamp.     Gary Toribio"Juan\" was contacted by Transitional Care Management services two days after his discharge. This encounter and supporting documentation was reviewed.    History of Present Illness  The patient presents for evaluation of anemia and a urinary tract infection (UTI).    He has been experiencing hematuria for approximately 2 weeks, which led to two hospital admissions. He reports no current hematuria but was diagnosed with a UTI this morning. He underwent both urine and blood tests at the lab yesterday, revealing a low blood count. He reports fatigue and lack of energy. He is scheduled for prostate surgery on 2025. He reports no chest pain or palpitations but does experience mild dyspnea during physical exertion. He also reports no dizziness, except for an initial episode upon returning home from the hospital, which has since resolved. He reports no gastrointestinal symptoms such as abdominal pain, nausea, vomiting, diarrhea, or constipation. He notes that his stools have become harder since discontinuing metformin. He reports no hematochezia or melena. He also reports no systemic symptoms such as fever or chills. He was prescribed metoprolol due to bradycardia, a condition that arose 6 months ago and raised concerns from his physician.    He reports dysuria and urinary " "frequency, necessitating bathroom visits every hour. He had a Welsh catheter inserted on 05/12/2025, but it became obstructed that night, prompting him to remove it. A subsequent cystoscopy performed by Dr. Cuellar revealed no significant findings, although he was informed that his prostate is enlarged, measuring over 200.       Review of Systems    /68   Pulse 82   Ht 1.803 m (5' 11\")   Wt 88 kg (194 lb)   SpO2 97%   BMI 27.06 kg/m²     Physical Exam  Vitals and nursing note reviewed.   Constitutional:       General: He is not in acute distress.     Appearance: Normal appearance.   HENT:      Head: Normocephalic and atraumatic.      Right Ear: Tympanic membrane, ear canal and external ear normal.      Left Ear: Tympanic membrane, ear canal and external ear normal.      Nose: Nose normal.      Mouth/Throat:      Mouth: Mucous membranes are moist.      Pharynx: Oropharynx is clear.   Eyes:      Extraocular Movements: Extraocular movements intact.      Conjunctiva/sclera: Conjunctivae normal.      Pupils: Pupils are equal, round, and reactive to light.   Neck:      Vascular: No carotid bruit.   Cardiovascular:      Rate and Rhythm: Normal rate and regular rhythm.      Pulses: Normal pulses.      Heart sounds: Normal heart sounds. No murmur heard.  Pulmonary:      Effort: Pulmonary effort is normal.      Breath sounds: Normal breath sounds. No wheezing, rhonchi or rales.   Abdominal:      General: Abdomen is flat. Bowel sounds are normal.      Palpations: Abdomen is soft. There is no mass.   Musculoskeletal:      Cervical back: Normal range of motion and neck supple.   Lymphadenopathy:      Cervical: No cervical adenopathy.   Skin:     Capillary Refill: Capillary refill takes less than 2 seconds.   Neurological:      General: No focal deficit present.      Mental Status: He is alert and oriented to person, place, and time.   Psychiatric:         Mood and Affect: Mood normal.         Behavior: Behavior normal. "         The complexity of medical decision making for this patient's transitional care is moderate.    Assessment/Plan   Problem List Items Addressed This Visit           ICD-10-CM    Hematuria, gross - Primary R31.0     Other Visit Diagnoses         Codes      Acute cystitis without hematuria     N30.00    Relevant Medications    sulfamethoxazole-trimethoprim (Bactrim DS) 800-160 mg tablet      Anemia, unspecified type     D64.9        Hematuria- follow up with urology about prostate    UTI- bactrim, follow with urology    Anemia- follow levels, iron supplement, added labs  Assessment & Plan  1. Anemia.  - Hemoglobin levels have decreased from 15 to 11 over the past 13 days and further dropped to 9 in the last few days, indicating a loss of 2 units of blood.  - Slight increase in hemoglobin level from 9.1 to 9.2 compared to the previous day, suggesting a potential recovery phase.  - Iron studies, B12, and folate levels will be added to the blood work conducted yesterday to identify any deficiencies.  - Appropriate supplements will be prescribed if deficiencies are identified to aid in blood regeneration.    2. Urinary Tract Infection (UTI).  - Reports burning during urination and frequent urination every hour.  - Prescription for the UTI was called in yesterday.  - No fever or chills reported.  - Monitoring symptoms and response to the prescribed medication.    3. Prostate Enlargement.  - Prostate size is over 200.  - Scheduled for robotic prostate surgery on 05/30/2025.  - Cystoscopy performed by Dr. Cuellar did not reveal any major issues.  - Referral to Dr. Prescott for the surgical procedure.    4. Medication Management.  - Currently taking spironolactone and aspirin.  - Metoprolol prescribed due to low pulse rate.  - Monitoring for any chest pain, shortness of breath, heart palpitations, dizziness, or changes in stool.  - No chest pain, significant shortness of breath, heart palpitations, dizziness, or changes in  stool reported.     This medical note was created with the assistance of artificial intelligence (AI) for documentation purposes. The content has been reviewed and confirmed by the healthcare provider for accuracy and completeness. Patient consented to the use of audio recording and use of AI during their visit.

## 2025-05-18 LAB — BACTERIA UR CULT: ABNORMAL

## 2025-05-19 ENCOUNTER — APPOINTMENT (OUTPATIENT)
Dept: PREADMISSION TESTING | Facility: HOSPITAL | Age: 80
End: 2025-05-19
Payer: MEDICARE

## 2025-05-19 LAB
FERRITIN SERPL-MCNC: 13 NG/ML (ref 24–380)
FOLATE SERPL-MCNC: 10.2 NG/ML
IRON SATN MFR SERPL: 7 % (CALC) (ref 20–48)
IRON SERPL-MCNC: 20 MCG/DL (ref 50–180)
TIBC SERPL-MCNC: 307 MCG/DL (CALC) (ref 250–425)
VIT B12 SERPL-MCNC: 261 PG/ML (ref 200–1100)

## 2025-05-20 DIAGNOSIS — D64.9 ANEMIA, UNSPECIFIED TYPE: Primary | ICD-10-CM

## 2025-05-20 RX ORDER — FERROUS SULFATE 325(65) MG
325 TABLET, DELAYED RELEASE (ENTERIC COATED) ORAL
Qty: 30 TABLET | Refills: 11 | Status: SHIPPED | OUTPATIENT
Start: 2025-05-20 | End: 2026-05-20

## 2025-05-27 ENCOUNTER — APPOINTMENT (OUTPATIENT)
Dept: RADIOLOGY | Facility: HOSPITAL | Age: 80
End: 2025-05-27
Payer: MEDICARE

## 2025-05-27 ENCOUNTER — PRE-ADMISSION TESTING (OUTPATIENT)
Dept: PREADMISSION TESTING | Facility: HOSPITAL | Age: 80
End: 2025-05-27
Payer: MEDICARE

## 2025-05-27 ENCOUNTER — APPOINTMENT (OUTPATIENT)
Dept: CARDIOLOGY | Facility: HOSPITAL | Age: 80
End: 2025-05-27
Payer: MEDICARE

## 2025-05-27 ENCOUNTER — ANESTHESIA EVENT (OUTPATIENT)
Dept: OPERATING ROOM | Facility: HOSPITAL | Age: 80
End: 2025-05-27
Payer: MEDICARE

## 2025-05-27 VITALS
BODY MASS INDEX: 27.05 KG/M2 | OXYGEN SATURATION: 97 % | WEIGHT: 193.2 LBS | DIASTOLIC BLOOD PRESSURE: 74 MMHG | SYSTOLIC BLOOD PRESSURE: 116 MMHG | HEIGHT: 71 IN | HEART RATE: 97 BPM | TEMPERATURE: 97.8 F

## 2025-05-27 DIAGNOSIS — Z01.818 PREOPERATIVE CLEARANCE: ICD-10-CM

## 2025-05-27 DIAGNOSIS — R31.9 URINARY TRACT INFECTION WITH HEMATURIA, SITE UNSPECIFIED: Primary | ICD-10-CM

## 2025-05-27 DIAGNOSIS — N40.1 BPH WITH OBSTRUCTION/LOWER URINARY TRACT SYMPTOMS: ICD-10-CM

## 2025-05-27 DIAGNOSIS — I25.10 CORONARY ARTERY DISEASE INVOLVING NATIVE CORONARY ARTERY OF NATIVE HEART WITHOUT ANGINA PECTORIS: ICD-10-CM

## 2025-05-27 DIAGNOSIS — Z01.810 PREOPERATIVE CARDIOVASCULAR EXAMINATION: ICD-10-CM

## 2025-05-27 DIAGNOSIS — D50.9 IRON DEFICIENCY ANEMIA, UNSPECIFIED IRON DEFICIENCY ANEMIA TYPE: ICD-10-CM

## 2025-05-27 DIAGNOSIS — Z01.811 PREOPERATIVE RESPIRATORY EXAMINATION: ICD-10-CM

## 2025-05-27 DIAGNOSIS — E11.69 TYPE 2 DIABETES MELLITUS WITH OTHER SPECIFIED COMPLICATION, UNSPECIFIED WHETHER LONG TERM INSULIN USE (MULTI): ICD-10-CM

## 2025-05-27 DIAGNOSIS — N13.8 BPH WITH OBSTRUCTION/LOWER URINARY TRACT SYMPTOMS: ICD-10-CM

## 2025-05-27 DIAGNOSIS — I10 PRIMARY HYPERTENSION: ICD-10-CM

## 2025-05-27 DIAGNOSIS — Z97.8 CHRONIC INDWELLING FOLEY CATHETER: ICD-10-CM

## 2025-05-27 DIAGNOSIS — N39.0 URINARY TRACT INFECTION WITH HEMATURIA, SITE UNSPECIFIED: Primary | ICD-10-CM

## 2025-05-27 DIAGNOSIS — R00.1 BRADYCARDIA: ICD-10-CM

## 2025-05-27 LAB
ABO GROUP (TYPE) IN BLOOD: NORMAL
ALBUMIN SERPL BCP-MCNC: 3.9 G/DL (ref 3.4–5)
ALP SERPL-CCNC: 114 U/L (ref 33–136)
ALT SERPL W P-5'-P-CCNC: 16 U/L (ref 10–52)
ANION GAP SERPL CALC-SCNC: 16 MMOL/L (ref 10–20)
ANTIBODY SCREEN: NORMAL
AST SERPL W P-5'-P-CCNC: 15 U/L (ref 9–39)
BILIRUB SERPL-MCNC: 0.7 MG/DL (ref 0–1.2)
BUN SERPL-MCNC: 18 MG/DL (ref 6–23)
CALCIUM SERPL-MCNC: 9 MG/DL (ref 8.6–10.6)
CHLORIDE SERPL-SCNC: 102 MMOL/L (ref 98–107)
CO2 SERPL-SCNC: 23 MMOL/L (ref 21–32)
CREAT SERPL-MCNC: 0.96 MG/DL (ref 0.5–1.3)
EGFRCR SERPLBLD CKD-EPI 2021: 79 ML/MIN/1.73M*2
ERYTHROCYTE [DISTWIDTH] IN BLOOD BY AUTOMATED COUNT: 14.3 % (ref 11.5–14.5)
EST. AVERAGE GLUCOSE BLD GHB EST-MCNC: 131 MG/DL
GLUCOSE SERPL-MCNC: 198 MG/DL (ref 74–99)
HBA1C MFR BLD: 6.2 % (ref ?–5.7)
HCT VFR BLD AUTO: 32 % (ref 41–52)
HGB BLD-MCNC: 10.1 G/DL (ref 13.5–17.5)
MCH RBC QN AUTO: 28.6 PG (ref 26–34)
MCHC RBC AUTO-ENTMCNC: 31.6 G/DL (ref 32–36)
MCV RBC AUTO: 91 FL (ref 80–100)
NRBC BLD-RTO: 0 /100 WBCS (ref 0–0)
PLATELET # BLD AUTO: 535 X10*3/UL (ref 150–450)
POTASSIUM SERPL-SCNC: 5.3 MMOL/L (ref 3.5–5.3)
PROT SERPL-MCNC: 6.9 G/DL (ref 6.4–8.2)
RBC # BLD AUTO: 3.53 X10*6/UL (ref 4.5–5.9)
RH FACTOR (ANTIGEN D): NORMAL
SODIUM SERPL-SCNC: 136 MMOL/L (ref 136–145)
WBC # BLD AUTO: 9.7 X10*3/UL (ref 4.4–11.3)

## 2025-05-27 PROCEDURE — 99205 OFFICE O/P NEW HI 60 MIN: CPT | Performed by: ANESTHESIOLOGY

## 2025-05-27 PROCEDURE — 85027 COMPLETE CBC AUTOMATED: CPT

## 2025-05-27 PROCEDURE — 80053 COMPREHEN METABOLIC PANEL: CPT

## 2025-05-27 PROCEDURE — 36415 COLL VENOUS BLD VENIPUNCTURE: CPT

## 2025-05-27 PROCEDURE — 83036 HEMOGLOBIN GLYCOSYLATED A1C: CPT

## 2025-05-27 PROCEDURE — 86923 COMPATIBILITY TEST ELECTRIC: CPT

## 2025-05-27 PROCEDURE — 87081 CULTURE SCREEN ONLY: CPT

## 2025-05-27 PROCEDURE — 86901 BLOOD TYPING SEROLOGIC RH(D): CPT

## 2025-05-27 RX ORDER — CHLORHEXIDINE GLUCONATE 40 MG/ML
1 SOLUTION TOPICAL DAILY
Qty: 473 ML | Refills: 0 | Status: SHIPPED | OUTPATIENT
Start: 2025-05-27 | End: 2025-06-01

## 2025-05-27 RX ORDER — CHLORHEXIDINE GLUCONATE ORAL RINSE 1.2 MG/ML
15 SOLUTION DENTAL DAILY
Qty: 473 ML | Refills: 0 | Status: SHIPPED | OUTPATIENT
Start: 2025-05-27 | End: 2025-06-01 | Stop reason: HOSPADM

## 2025-05-27 ASSESSMENT — DUKE ACTIVITY SCORE INDEX (DASI)
CAN YOU TAKE CARE OF YOURSELF (EAT, DRESS, BATHE, OR USE TOILET): YES
CAN YOU DO HEAVY WORK AROUND THE HOUSE LIKE SCRUBBING FLOORS OR LIFTING AND MOVING HEAVY FURNITURE: YES
CAN YOU DO LIGHT WORK AROUND THE HOUSE LIKE DUSTING OR WASHING DISHES: YES
CAN YOU PARTICIPATE IN STRENOUS SPORTS LIKE SWIMMING, SINGLES TENNIS, FOOTBALL, BASKETBALL, OR SKIING: NO
CAN YOU WALK INDOORS, SUCH AS AROUND YOUR HOUSE: YES
CAN YOU DO YARD WORK LIKE RAKING LEAVES, WEEDING OR PUSHING A MOWER: YES
CAN YOU CLIMB A FLIGHT OF STAIRS OR WALK UP A HILL: YES
CAN YOU WALK A BLOCK OR TWO ON LEVEL GROUND: YES
TOTAL_SCORE: 45.45
CAN YOU DO MODERATE WORK AROUND THE HOUSE LIKE VACUUMING, SWEEPING FLOORS OR CARRYING GROCERIES: YES
CAN YOU HAVE SEXUAL RELATIONS: NO
CAN YOU RUN A SHORT DISTANCE: YES
CAN YOU PARTICIPATE IN MODERATE RECREATIONAL ACTIVITIES LIKE GOLF, BOWLING, DANCING, DOUBLES TENNIS OR THROWING A BASEBALL OR FOOTBALL: YES
DASI METS SCORE: 8.3

## 2025-05-27 ASSESSMENT — LIFESTYLE VARIABLES: SMOKING_STATUS: NONSMOKER

## 2025-05-27 NOTE — CPM/PAT H&P
"CPM/PAT Evaluation       Name: Gary Pineda (Gary Pineda \"Juan\")  /Age: 1945/80 y.o.     In-Person     HPI  79 y/o M scheduled for robotic prostatectomy on  with Dr. Garcia secondary to BPH w/ lower urinary tract symptoms and gross hematuria.  PMHX includes anemia, HLD, HTN, T2DM, CAD with elevated coronary artery calcium score (>1000 from 2015).  Presents to [facility] today for perioperative risk stratification and optimization.     Medical History[1]    Surgical History[2]    Patient  has no history on file for sexual activity.    Family History[3]    Allergies[4]    Prior to Admission medications    Medication Sig Start Date End Date Taking? Authorizing Provider   acetaminophen (Tylenol) 325 mg tablet Take 2 tablets (650 mg) by mouth every 4 hours if needed for mild pain (1 - 3). 25   Lobo Rueda, APRN-CNP   atorvastatin (Lipitor) 80 mg tablet TAKE 1 TABLET (80 MG) BY MOUTH ONCE DAILY IN THE EVENING. 1/3/25 7/2/25  Kale Cazares DO   blood sugar diagnostic (OneTouch Verio test strips) strip FOR TESTING ONE A DAY DX E 11.9 25   Kale Cazares DO   cholecalciferol (Vitamin D-3) 25 MCG (1000 UT) capsule Take 1 capsule (25 mcg) by mouth once daily. 20   Historical Provider, MD   empagliflozin (Jardiance) 25 mg Take 1 tablet (25 mg) by mouth once daily. 24  Kale Cazares DO   ferrous sulfate 325 (65 Fe) mg EC tablet Take 1 tablet by mouth once daily with breakfast. Do not crush, chew, or split. 25  Kale Cazares DO   Januvia 100 mg tablet TAKE 1 TABLET BY MOUTH EVERY DAY 24   Kale Cazares DO   losartan (Cozaar) 25 mg tablet TAKE 1 TABLET BY MOUTH EVERY DAY 24   Kale Cazares DO   sulfamethoxazole-trimethoprim (Bactrim DS) 800-160 mg tablet Take 1 tablet by mouth 2 times a day for 7 days. 25  Kale Cazares DO        PAT ROS     Physical Exam  Cardiovascular:      Rate and Rhythm: Normal rate and regular rhythm. "   Pulmonary:      Effort: Pulmonary effort is normal.      Breath sounds: Normal breath sounds.   Abdominal:      General: Abdomen is flat.      Palpations: Abdomen is soft.   Musculoskeletal:      Cervical back: Normal range of motion.      Right lower leg: No edema.      Left lower leg: No edema.   Skin:     General: Skin is warm and dry.   Neurological:      Mental Status: He is alert and oriented to person, place, and time.          PAT AIRWAY:   Airway:     Mallampati::  III    TM distance::  >3 FB    Neck ROM::  Full   Per patient, upper crown to be replaced day prior to surgery.   normal        Testing/Diagnostic:     Patient Specialist/PCP:     Visit Vitals  Smoking Status Never       DASI Risk Score      Flowsheet Row Questionnaire Series Submission from 5/14/2025 in [facility] OR with Generic Provider Le Floch Depollutionhart Questionnaire Series Submission from 4/16/2025 in [facility] OR with Generic Provider Le Floch Depollutionhart   Can you take care of yourself (eat, dress, bathe, or use toilet)?  2.75  filed at 05/14/2025 1643 2.75  filed at 04/16/2025 1156   Can you walk indoors, such as around your house? 1.75  filed at 05/14/2025 1643 1.75  filed at 04/16/2025 1156   Can you walk a block or two on level ground?  2.75  filed at 05/14/2025 1643 2.75  filed at 04/16/2025 1156   Can you climb a flight of stairs or walk up a hill? 5.5  filed at 05/14/2025 1643 5.5  filed at 04/16/2025 1156   Can you run a short distance? 0  filed at 05/14/2025 1643 0  filed at 04/16/2025 1156   Can you do light work around the house like dusting or washing dishes? 2.7  filed at 05/14/2025 1643 2.7  filed at 04/16/2025 1156   Can you do moderate work around the house like vacuuming, sweeping floors or carrying groceries? 3.5  filed at 05/14/2025 1643 3.5  filed at 04/16/2025 1156   Can you do heavy work around the house like scrubbing floors or lifting and moving heavy furniture?  8  filed at 05/14/2025 8050 8  filed  at 04/16/2025 1156   Can you do yard work like raking leaves, weeding or pushing a mower? 4.5  filed at 05/14/2025 1643 4.5  filed at 04/16/2025 1156   Can you have sexual relations? 0  filed at 05/14/2025 1643 0  filed at 04/16/2025 1156   Can you participate in moderate recreational activities like golf, bowling, dancing, doubles tennis or throwing a baseball or football? 0  filed at 05/14/2025 1643 0  filed at 04/16/2025 1156   Can you participate in strenous sports like swimming, singles tennis, football, basketball, or skiing? 0  filed at 05/14/2025 1643 0  filed at 04/16/2025 1156   DASI SCORE 31.45  filed at 05/14/2025 1643 31.45  filed at 04/16/2025 1156   METS Score (Will be calculated only when all the questions are answered) 6.6  filed at 05/14/2025 1643 6.6  filed at 04/16/2025 1156          Caprini DVT Assessment      Flowsheet Row ED to Hosp-Admission (Discharged) from 5/5/2025 in [address] with Poornima Mendoza MD and Audrey Fonseca, DO   DVT Score (IF A SCORE IS NOT CALCULATING, MUST SELECT A BMI TO COMPLETE) 4 filed at 05/05/2025 1221   BMI (BMI MUST BE CHOSEN) 30 or less filed at 05/05/2025 1221          Modified Frailty Index    No data to display       [id number] Stroke Risk Points         N/A 0 to 9 Points:      Last Change: N/A          The [id number] risk score (Lip GH, et al. 2009. © 2010 American [employer]) quantifies the risk of stroke for a patient with atrial fibrillation. For patients without atrial fibrillation or under the age of 18 this score appears as N/A. Higher score values generally indicate higher risk of stroke.        This score is not applicable to this patient. Components are not calculated.          Revised Cardiac Risk Index    No data to display       Apfel Simplified Score    No data to display       Risk Analysis Index Results This Encounter    No data found in the last 10 encounters.       Stop Bang Score      Flowsheet Row  Questionnaire Series Submission from 5/14/2025 in [facility] OR with Generic Provider Mychart Questionnaire Series Submission from 4/16/2025 in [facility] OR with Generic Provider Mychart   Do you snore loudly? 0  filed at 05/14/2025 1643 0  filed at 04/16/2025 1156   Do you often feel tired or fatigued after your sleep? 0  filed at 05/14/2025 1643 0  filed at 04/16/2025 1156   Has anyone ever observed you stop breathing in your sleep? 0  filed at 05/14/2025 1643 0  filed at 04/16/2025 1156   Do you have or are you being treated for high blood pressure? 1  filed at 05/14/2025 1643 1  filed at 04/16/2025 1156   Recent BMI (Calculated) 27.9  filed at 05/14/2025 1643 27.2  filed at 04/16/2025 1156   Is BMI greater than 35 kg/m2? 0=No  filed at 05/14/2025 1643 0=No  filed at 04/16/2025 1156   Age older than 50 years old? 1=Yes  filed at 05/14/2025 1643 1=Yes  filed at 04/16/2025 1156   Gender - Male 1=Yes  filed at 05/14/2025 1643 1=Yes  filed at 04/16/2025 1156          Prodigy: High Risk  Total Score: 24              Prodigy Age Score      Prodigy Gender Score          ARISCAT Score for Postoperative Pulmonary Complications    No data to display       Graciela Perioperative Risk for Myocardial Infarction or Cardiac Arrest (TONY)    No data to display         Assessment and Plan     History of Present Illness     79 y/o M scheduled for robotic prostatectomy on 5/30 with Dr. Garcia secondary to BPH w/ lower urinary tract symptoms and gross hematuria.  PMHX includes anemia, HLD, HTN, T2DM, elevated coronary artery calcium score (>1000 from 2015). Presents to CPM today for perioperative risk stratification and optimization.     Neuro   No neurologic diagnosis, however, the patient is at increased risk for perioperative delirium secondary to  age, type and duration of surgery, Patient instructed on and provided cognitive exercises  Patient is at increased risk for perioperative CVA  "secondary to  cardiac disease, HTN, DM, increased age, operative time > 2.5 hours      HEENT   No HEENT diagnosis or significant findings on chart review or clinical presentation and evaluation. No further preoperative testing/intervention indicated at this time.      Cardiovascular   #HLD and CAD on atorvastatin (continue); previously on aspirin (patient self discontinued w/ recent hematuria). Patient with elevated coronary artery calcium score of 1106 back in 2015 that has not been further worked up or evaluated by cardiology. Given that patient is scheduled for major surgery >3 hours, will need stress test prior to surgery. Surgeon Dr. Garcia notified of need for stress test. Stress test scheduled for 11AM on 5/28/2025. Cardiology referral placed.   #HTN on losartan (hold 24 hours prior)   METS: 8.3  RCRI: 1 point, 6.0% risk for postoperative MACE   TONY: 0.3% risk for 30-day postoperative MACE  EKG - 5/5/2025 - NSR with first degree AV block   Calcium Score 6/10/2015  The calcium score of the individual coronary arteries is as follows:          LM                 0       LAD               222.79          LCx                47.85       RCA               835.46  1.  Total coronary artery calcium score of 1106.1 falls within the   \"Significant plaque burden\" category.    2.  Bilateral lower lobe bronchial wall thickening, multifocal mucus   plugging and adjacent patchy airspace opacities which may reflect   sequelae of an infectious or inflammatory small airways disease   process.  Correlation and follow-up are suggested.    Pulmonary   No pulmonary diagnosis, however patient is at increased risk of perioperative complications secondary to  age > 60, duration of surgery > 2 hours  Stop Bang score is 3 placing patient at moderate risk for REENA  ARISCAT: >45 points, 42.1% risk of in-hospital postoperative pulmonary complication  PRODIGY: High risk for opioid-induced respiratory depression  [facility] toilet education " discussed, patient also provided deep breathing exercises and incentive spirometry educational handout      Renal   #BPH w/ lower urinary tract symptoms and gross hematuria. Had diego placed 5/12 given ongoing hematuria with increased urinary urgency/frequency; however, was removed same day due to clots and was not replaced. Per patient, hematuria has resolved.       Endocrine   #T2DM. On empagliflozin (hold 3 days prior to surgery) and januvia (hold night prior to surgery). Last A1c 7.3 - 2/4/2025. Will repeat today.       Hematologic   #anemia. On ferrous sulfate. Will collect CBC and type and screen today.   Caprini Score 9, patient at High risk for perioperative DVT.  Patient provided VTE education/handout.      Gastrointestinal   No GI diagnosis or significant findings on chart review or clinical presentation and evaluation.   Eat-10 score 0  Apfel 2      Infectious Disease   No infectious diagnosis or significant findings on chart review or clinical presentation and evaluation.   Prescription provided for CHG body wash and dental rinse. CHG use instructions reviewed and provided to patient.  Staph screen collected      Musculoskeletal   No diagnosis or significant findings on chart review or clinical presentation and evaluation.       Anesthesia/Airway   No anesthesia complications        Medication, NPO, and all other CPM instructions were reviewed with the patient.  All patient questions were addressed.    Patient seen and staffed with Dr. 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    Other conditions influencing health status 06/08/2015    History of cough    Other conditions influencing health status 03/12/2019    Diabetes mellitus type II, uncontrolled    Pain, unspecified 08/09/2016    Acute pain    Personal history of diseases of the skin and subcutaneous tissue 12/04/2014    History of seborrheic keratosis    Personal history of other diseases of male genital organs     History of benign prostatic hypertrophy    Personal history of other diseases of the musculoskeletal system and connective tissue     History of arthritis    Prediabetes 12/09/2015    Pre-diabetes    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    Unspecified cataract     Cataract of both eyes    Unspecified injury of unspecified lower leg, initial encounter     Knee injury    Urinary retention 06/13/2023   [2]   Past Surgical History:  Procedure Laterality Date    CATARACT EXTRACTION  02/18/2015    Cataract Surgery    COLONOSCOPY  09/09/2013    Complete Colonoscopy    HERNIA REPAIR  09/09/2013    Inguinal Hernia Repair    KNEE ARTHROSCOPY W/ DEBRIDEMENT  09/09/2013    Knee Arthroscopy (Therapeutic)    OTHER SURGICAL HISTORY  10/09/2020    Knee replacement    ROTATOR CUFF REPAIR  09/09/2013    Rotator Cuff Repair   [3] No family history on file.  [4]   Allergies  Allergen Reactions    Lisinopril Cough

## 2025-05-27 NOTE — H&P (VIEW-ONLY)
"CPM/PAT Evaluation       Name: Gary Pineda (Gary Pineda \"Juan\")  /Age: 1945/80 y.o.     In-Person     HPI  79 y/o M scheduled for robotic prostatectomy on  with Dr. Garcia secondary to BPH w/ lower urinary tract symptoms and gross hematuria.  PMHX includes anemia, HLD, HTN, T2DM, CAD with elevated coronary artery calcium score (>1000 from 2015).  Presents to [facility] today for perioperative risk stratification and optimization.     Medical History[1]    Surgical History[2]    Patient  has no history on file for sexual activity.    Family History[3]    Allergies[4]    Prior to Admission medications    Medication Sig Start Date End Date Taking? Authorizing Provider   acetaminophen (Tylenol) 325 mg tablet Take 2 tablets (650 mg) by mouth every 4 hours if needed for mild pain (1 - 3). 25   Lobo Rueda, APRN-CNP   atorvastatin (Lipitor) 80 mg tablet TAKE 1 TABLET (80 MG) BY MOUTH ONCE DAILY IN THE EVENING. 1/3/25 7/2/25  Kale Cazares DO   blood sugar diagnostic (OneTouch Verio test strips) strip FOR TESTING ONE A DAY DX E 11.9 25   Kale Cazares DO   cholecalciferol (Vitamin D-3) 25 MCG (1000 UT) capsule Take 1 capsule (25 mcg) by mouth once daily. 20   Historical Provider, MD   empagliflozin (Jardiance) 25 mg Take 1 tablet (25 mg) by mouth once daily. 24  Kale Cazares DO   ferrous sulfate 325 (65 Fe) mg EC tablet Take 1 tablet by mouth once daily with breakfast. Do not crush, chew, or split. 25  Kale Cazares DO   Januvia 100 mg tablet TAKE 1 TABLET BY MOUTH EVERY DAY 24   Kale Cazares DO   losartan (Cozaar) 25 mg tablet TAKE 1 TABLET BY MOUTH EVERY DAY 24   Kale Cazraes DO   sulfamethoxazole-trimethoprim (Bactrim DS) 800-160 mg tablet Take 1 tablet by mouth 2 times a day for 7 days. 25  Kale Cazares DO        PAT ROS     Physical Exam  Cardiovascular:      Rate and Rhythm: Normal rate and regular rhythm. "   Pulmonary:      Effort: Pulmonary effort is normal.      Breath sounds: Normal breath sounds.   Abdominal:      General: Abdomen is flat.      Palpations: Abdomen is soft.   Musculoskeletal:      Cervical back: Normal range of motion.      Right lower leg: No edema.      Left lower leg: No edema.   Skin:     General: Skin is warm and dry.   Neurological:      Mental Status: He is alert and oriented to person, place, and time.          PAT AIRWAY:   Airway:     Mallampati::  III    TM distance::  >3 FB    Neck ROM::  Full   Per patient, upper crown to be replaced day prior to surgery.   normal        Testing/Diagnostic:     Patient Specialist/PCP:     Visit Vitals  Smoking Status Never       DASI Risk Score      Flowsheet Row Questionnaire Series Submission from 5/14/2025 in [facility] with Generic Provider AriadNEXThart Questionnaire Series Submission from 4/16/2025 in [facility] OR with Generic Provider AriadNEXThart   Can you take care of yourself (eat, dress, bathe, or use toilet)?  2.75  filed at 05/14/2025 1643 2.75  filed at 04/16/2025 1156   Can you walk indoors, such as around your house? 1.75  filed at 05/14/2025 1643 1.75  filed at 04/16/2025 1156   Can you walk a block or two on level ground?  2.75  filed at 05/14/2025 1643 2.75  filed at 04/16/2025 1156   Can you climb a flight of stairs or walk up a hill? 5.5  filed at 05/14/2025 1643 5.5  filed at 04/16/2025 1156   Can you run a short distance? 0  filed at 05/14/2025 1643 0  filed at 04/16/2025 1156   Can you do light work around the house like dusting or washing dishes? 2.7  filed at 05/14/2025 1643 2.7  filed at 04/16/2025 1156   Can you do moderate work around the house like vacuuming, sweeping floors or carrying groceries? 3.5  filed at 05/14/2025 1643 3.5  filed at 04/16/2025 1156   Can you do heavy work around the house like scrubbing floors or lifting and moving heavy furniture?  8  filed at 05/14/2025 5219 8  filed  at 04/16/2025 1156   Can you do yard work like raking leaves, weeding or pushing a mower? 4.5  filed at 05/14/2025 1643 4.5  filed at 04/16/2025 1156   Can you have sexual relations? 0  filed at 05/14/2025 1643 0  filed at 04/16/2025 1156   Can you participate in moderate recreational activities like golf, bowling, dancing, doubles tennis or throwing a baseball or football? 0  filed at 05/14/2025 1643 0  filed at 04/16/2025 1156   Can you participate in strenous sports like swimming, singles tennis, football, basketball, or skiing? 0  filed at 05/14/2025 1643 0  filed at 04/16/2025 1156   DASI SCORE 31.45  filed at 05/14/2025 1643 31.45  filed at 04/16/2025 1156   METS Score (Will be calculated only when all the questions are answered) 6.6  filed at 05/14/2025 1643 6.6  filed at 04/16/2025 1156          Caprini DVT Assessment      Flowsheet Row ED to Hosp-Admission (Discharged) from 5/5/2025 in [address] with Poornima Mendoza MD and Audrey Fonseca, DO   DVT Score (IF A SCORE IS NOT CALCULATING, MUST SELECT A BMI TO COMPLETE) 4 filed at 05/05/2025 1221   BMI (BMI MUST BE CHOSEN) 30 or less filed at 05/05/2025 1221          Modified Frailty Index    No data to display       [id number] Stroke Risk Points         N/A 0 to 9 Points:      Last Change: N/A          The [id number] risk score (Lip GH, et al. 2009. © 2010 [employer]) quantifies the risk of stroke for a patient with atrial fibrillation. For patients without atrial fibrillation or under the age of 18 this score appears as N/A. Higher score values generally indicate higher risk of stroke.        This score is not applicable to this patient. Components are not calculated.          Revised Cardiac Risk Index    No data to display       Apfel Simplified Score    No data to display       Risk Analysis Index Results This Encounter    No data found in the last 10 encounters.       Stop Bang Score      Flowsheet Row  Questionnaire Series Submission from 5/14/2025 in [facility] OR with Generic Provider Mychart Questionnaire Series Submission from 4/16/2025 in [facility] OR with Generic Provider Mychart   Do you snore loudly? 0  filed at 05/14/2025 1643 0  filed at 04/16/2025 1156   Do you often feel tired or fatigued after your sleep? 0  filed at 05/14/2025 1643 0  filed at 04/16/2025 1156   Has anyone ever observed you stop breathing in your sleep? 0  filed at 05/14/2025 1643 0  filed at 04/16/2025 1156   Do you have or are you being treated for high blood pressure? 1  filed at 05/14/2025 1643 1  filed at 04/16/2025 1156   Recent BMI (Calculated) 27.9  filed at 05/14/2025 1643 27.2  filed at 04/16/2025 1156   Is BMI greater than 35 kg/m2? 0=No  filed at 05/14/2025 1643 0=No  filed at 04/16/2025 1156   Age older than 50 years old? 1=Yes  filed at 05/14/2025 1643 1=Yes  filed at 04/16/2025 1156   Gender - Male 1=Yes  filed at 05/14/2025 1643 1=Yes  filed at 04/16/2025 1156          Prodigy: High Risk  Total Score: 24              Prodigy Age Score      Prodigy Gender Score          ARISCAT Score for Postoperative Pulmonary Complications    No data to display       Graciela Perioperative Risk for Myocardial Infarction or Cardiac Arrest (TONY)    No data to display         Assessment and Plan     History of Present Illness     79 y/o M scheduled for robotic prostatectomy on 5/30 with Dr. Garcia secondary to BPH w/ lower urinary tract symptoms and gross hematuria.  PMHX includes anemia, HLD, HTN, T2DM, elevated coronary artery calcium score (>1000 from 2015). Presents to CPM today for perioperative risk stratification and optimization.     Neuro   No neurologic diagnosis, however, the patient is at increased risk for perioperative delirium secondary to  age, type and duration of surgery, Patient instructed on and provided cognitive exercises  Patient is at increased risk for perioperative CVA  "secondary to  cardiac disease, HTN, DM, increased age, operative time > 2.5 hours      HEENT   No HEENT diagnosis or significant findings on chart review or clinical presentation and evaluation. No further preoperative testing/intervention indicated at this time.      Cardiovascular   #HLD and CAD on atorvastatin (continue); previously on aspirin (patient self discontinued w/ recent hematuria). Patient with elevated coronary artery calcium score of 1106 back in 2015 that has not been further worked up or evaluated by cardiology. Given that patient is scheduled for major surgery >3 hours, will need stress test prior to surgery. Surgeon Dr. Garcia notified of need for stress test. Stress test scheduled for 11AM on 5/28/2025. Cardiology referral placed.   #HTN on losartan (hold 24 hours prior)   METS: 8.3  RCRI: 1 point, 6.0% risk for postoperative MACE   TONY: 0.3% risk for 30-day postoperative MACE  EKG - 5/5/2025 - NSR with first degree AV block   Calcium Score 6/10/2015  The calcium score of the individual coronary arteries is as follows:          LM                 0       LAD               222.79          LCx                47.85       RCA               835.46  1.  Total coronary artery calcium score of 1106.1 falls within the   \"Significant plaque burden\" category.    2.  Bilateral lower lobe bronchial wall thickening, multifocal mucus   plugging and adjacent patchy airspace opacities which may reflect   sequelae of an infectious or inflammatory small airways disease   process.  Correlation and follow-up are suggested.    Pulmonary   No pulmonary diagnosis, however patient is at increased risk of perioperative complications secondary to  age > 60, duration of surgery > 2 hours  Stop Bang score is 3 placing patient at moderate risk for REENA  ARISCAT: >45 points, 42.1% risk of in-hospital postoperative pulmonary complication  PRODIGY: High risk for opioid-induced respiratory depression  [facility] toilet education " discussed, patient also provided deep breathing exercises and incentive spirometry educational handout      Renal   #BPH w/ lower urinary tract symptoms and gross hematuria. Had diego placed 5/12 given ongoing hematuria with increased urinary urgency/frequency; however, was removed same day due to clots and was not replaced. Per patient, hematuria has resolved.       Endocrine   #T2DM. On empagliflozin (hold 3 days prior to surgery) and januvia (hold night prior to surgery). Last A1c 7.3 - 2/4/2025. Will repeat today.       Hematologic   #anemia. On ferrous sulfate. Will collect CBC and type and screen today.   Caprini Score 9, patient at High risk for perioperative DVT.  Patient provided VTE education/handout.      Gastrointestinal   No GI diagnosis or significant findings on chart review or clinical presentation and evaluation.   Eat-10 score 0  Apfel 2      Infectious Disease   No infectious diagnosis or significant findings on chart review or clinical presentation and evaluation.   Prescription provided for CHG body wash and dental rinse. CHG use instructions reviewed and provided to patient.  Staph screen collected      Musculoskeletal   No diagnosis or significant findings on chart review or clinical presentation and evaluation.       Anesthesia/Airway   No anesthesia complications        Medication, NPO, and all other CPM instructions were reviewed with the patient.  All patient questions were addressed.    Patient seen and staffed with Dr. 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   • 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   • Other conditions influencing health status 06/08/2015    History of cough   • Other conditions influencing health status 03/12/2019    Diabetes mellitus type II, uncontrolled   • Pain, unspecified 08/09/2016    Acute pain   • Personal history of diseases of the skin and subcutaneous tissue 12/04/2014    History of seborrheic keratosis   • Personal history of other diseases of male genital organs     History of benign prostatic hypertrophy   • Personal history of other diseases of the musculoskeletal system and connective tissue     History of arthritis   • Prediabetes 12/09/2015    Pre-diabetes   • 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   • Unspecified cataract     Cataract of both eyes   • Unspecified injury of unspecified lower leg, initial encounter     Knee injury   • Urinary retention 06/13/2023   [2]  Past Surgical History:  Procedure Laterality Date   • CATARACT EXTRACTION  02/18/2015    Cataract Surgery   • COLONOSCOPY  09/09/2013    Complete Colonoscopy   • HERNIA REPAIR  09/09/2013    Inguinal Hernia Repair   • KNEE ARTHROSCOPY W/ DEBRIDEMENT  09/09/2013    Knee Arthroscopy (Therapeutic)   • OTHER SURGICAL HISTORY  10/09/2020    Knee replacement   • ROTATOR CUFF REPAIR  09/09/2013    Rotator Cuff Repair   [3]  No family history on file.  [4]  Allergies  Allergen Reactions   • Lisinopril Cough

## 2025-05-27 NOTE — NURSING NOTE
Patient was seen in [facility] with myself as their nurse and Parvez Cooley MD as provider. Orders were reviewed between provider, nurse and patient.     Following labs were obtained by documenting RN:    Hemoglobin A1C  Type and Screen  CMP  CBC  MRSA Swab    EKG: None taken this visit.    Patient discharged with: Pre-Procedure Instructions, AVS, and any additional supplies/information needed for preop completion.    Berhane AUGUSTINN, RN  [facility]Ad[facility]   [facility]

## 2025-05-27 NOTE — PREPROCEDURE INSTRUCTIONS
Thank you for visiting [facility] today for your pre-procedure evaluation, you were seen by Dr. Parvez Cooley ([phone])    This summary includes instructions and information to aid you during your perioperative period.  Please read carefully. If you have any questions about your visit today, please call the number listed above.  If you become ill or have any changes to your health before your surgery, please contact your primary care provider and alert your surgeon.    Preparing for your Surgery       Exercises  Preoperative Deep Breathing Exercises  Why it is important to do deep breathing exercises before my surgery?  Deep breathing exercises strengthen your breathing muscles.  This helps you to recover after your surgery and decreases the chance of breathing complications.  How are the deep breathing exercises done?  Sit straight with your back supported.  Breathe in deeply and slowly through your nose. Your lower rib cage should expand and your abdomen may move forward.  Hold that breath for 3 to 5 seconds.  Breathe out through pursed lips, slowly and completely.  Rest and repeat 10 times every hour while awake.  Rest longer if you become dizzy or lightheaded.      Preoperative Brain Exercises    What are brain exercises?  A brain exercise is any activity that engages your thinking (cognitive) skills.    What types of activities are considered brain exercises?  Jigsaw puzzles, crossword puzzles, word jumble, memory games, word search, and many more.  Many can be found free online or on your phone via a mobile nahum.    Why should I do brain exercises before my surgery?  More recent research has shown brain exercise before surgery can lower the risk of postoperative delirium (confusion) which can be especially important for older adults.  Patients who did brain exercises for 5 to 10 hours the days before surgery, cut their risk of postoperative delirium in half up to 1 week  after surgery.    Sit-to-Stand Exercise    What is the sit-to-stand exercise?  The sit-to-stand exercise strengthens the muscles of your lower body and muscles in the center of your body (core muscles for stability) helping to maintain and improve your strength and mobility.  How do I do the sit-to-stand exercise?  The goal is to do this exercise without using your arms or hands.  If this is too difficult, use your arms and hands or a chair with armrests to help slowly push yourself to the standing position and lower yourself back to the sitting position. As the movement becomes easier use your arms and hands less.    Steps to the sit-to-stand exercise  Sit up tall in a sturdy chair, knees bent, feet flat on the floor shoulder-width apart.  Shift your hips/pelvis forward in the chair to correctly position yourself for the next movement.  Lean forward at your hips.  Stand up straight putting equal weight on both feet.  Check to be sure you are properly aligned with the chair, in a slow controlled movement sit back down.  Repeat this exercise 10-15 times.  If needed you can do it fewer times until your strength improves.  Rest for 1 minute.  Do another 10-15 sit-to-stand exercises.  Try to do this in the morning and evening.        Instructions    Preoperative Fasting Guidelines    Why must I stop eating and drinking near surgery time?  With sedation, food or liquid in your stomach can enter your lungs causing serious complications  Food can increase nausea and vomiting  When do I need to stop eating and drinking before my surgery?      Do not eat any food after midnight the night before your surgery/procedure. You may have up to 13.5 ounces of clear liquid until TWO hours before your instructed arrival time to the hospital.  This includes water, black tea/coffee, (no milk or cream) apple juice, and electrolyte drinks (Gatorade). You may chew gum until TWO hours before your surgery/procedure            Simple things  you can do to help prevent blood clots     Blood clots are blockages that can form in the body's veins. When a blood clot forms in your deep veins, it may be called a deep vein thrombosis, or DVT for short. Blood clots can happen in any part of the body where blood flows, but they are most common in the arms and legs. If a piece of a blood clot breaks free and travels to the lungs, it is called a pulmonary embolus (PE). A PE can be a very serious problem.         Being in the hospital or having surgery can raise your chances of getting a blood clot because you may not be well enough to move around as much as you normally do.         Ways you can help prevent blood clots in the hospital       Wearing SCDs  SCDs stands for Sequential Compression Devices.   SCDs are special sleeves that wrap around your legs. They attach to a pump that fills them with air to gently squeeze your legs every few minutes.  This helps return the blood in your legs to your heart.   SCDs should only be taken off when walking or bathing. SCDs may not be comfortable, but they can help save your life.              Pump SCD leg sleeves  Wearing compression stockings - if your doctor orders them. These special snug-fitting stockings gently squeeze your legs to help blood flow.       Walking. Walking helps move the blood in your legs.   If your doctor says it is ok, try walking the halls at least   5 times a day. Ask us to help you get up, so you don't fall.      Taking any blood-thinning medicines your doctor orders.              Ways you can help prevent blood clots at home         Wearing compression stockings - if your doctor orders them.   Walking - to help move the blood in your legs.    Taking any blood-thinning medicines your doctor orders.      Signs of a blood clot or PE    Tell your doctor or nurse right away if you have any of the problems listed below.         If you are at home, seek medical care right away. Call 911 for chest pain or  "problems breathing.            Signs of a blood clot (DVT) - such as pain, swelling, redness, or warmth in your arm or legs.  Signs of a pulmonary embolism (PE) - such as chest pain or feeling short of breath      Tobacco and Alcohol;  Do not drink alcohol or smoke within 24 hours of surgery.  It is best to quit smoking for as long as possible before any surgery or procedure.     Other Instructions  Why did I have my nose, under my arms, and groin swabbed? The purpose of the swab is to identify Staphylococcus aureus inside your nose or on your skin.  The swab was sent to the laboratory for culture.  A positive swab/culture for Staphylococcus aureus is called colonization or carriage.   What is Staphylococcus aureus? Staphylococcus aureus, also known as \"staph\", is a germ found on the skin or in the nose of healthy people.  Sometimes Staphylococcus aureus can get into the body and cause an infection.  This can be minor (such as pimples, boils, or other skin problems).  It might also be serious (such as a blood infection, pneumonia, or a surgical site infection). What is Staphylococcus aureus colonization or carriage? Colonization or carriage means that a person has the germ but is not sick from it.  These bacteria can be spread on the hands or when breathing or sneezing. How is Staphylococcus aureus spread? It is most often spread by close contact with a person or item that carries it. What happens if my culture is positive for Staphylococcus aureus? Your doctor/medical team will use this information to guide any antibiotic treatment which may be necessary.  Regardless of the culture results, we will clean the inside of your nose with a betadine swab just before you have your surgery. Will I get an infection if I have Staphylococcus aureus in my nose or on my skin? Anyone can get an infection with Staphylococcus aureus.  However, the best way to reduce your risk of infection is to follow the instructions provided to " you for the use of your CHG soap and dental rinse.  , Body Wash; What is a home preoperative antibacterial shower? This shower is a way of cleaning the skin with a germ-killing solution before surgery.  The solution contains chlorhexidine, commonly known as CHG.  CHG is a skin cleanser with germ-killing ability.  Let your doctor know if you are allergic to chlorhexidine. Why do I need to take a preoperative antibacterial shower? Skin is not sterile.  It is best to try to make your skin as free of germs as possible before surgery.  Proper cleansing with a germ-killing soap before surgery can lower the number of germs on your skin.  This helps to reduce the risk of infection at the surgical site.  Following the instructions listed below will help you prepare your skin for surgery.   How do I use the solution? Steps:  Begin using your CHG soap 5 days before your scheduled surgery on ___________.   First, wash and rinse your hair using the CHG soap. Keep CHG soap away from ear canals and eyes.  Rinse completely, do not condition.  Hair extensions should be removed. , Oral/Dental Rinse: What is oral/dental rinse?  It is a mouthwash. It is a way of cleaning the mouth with a germ-killing solution before your surgery.  The solution contains chlorhexidine, commonly known as CHG. It is used inside the mouth to kill a bacteria known as Staphylococcus aureus.  Let your doctor know if you are allergic to Chlorhexidine. Why do I need to use CHG oral/dental rinse? The CHG oral/dental rinse helps to kill a bacteria in your mouth known as Staphylococcus aureus.  This reduces the risk of infection at the surgical site.  Using your CHG oral/dental rinse STEPS: Use your CHG oral/dental rinse after you brush your teeth the night before (at bedtime) and the morning of your surgery.  Follow all directions on your prescription label.  Use the cap on the container to measure 15 ml.  Swish (gargle if you can) the mouthwash in your mouth for  at least 30 seconds, (do not swallow) and spit out.  After you use your CHG rinse, do not rinse your mouth with water, drink or eat.  Please refer to the prescription label for the appropriate time to resume oral intake What side effects might I have using the CHG oral/dental rinse? CHG rinse will stick to plaque on the teeth.  Brush and floss just before use.  Teeth brushing will help avoid staining of plaque during use.         The Week before Surgery        Seven days before Surgery  Check your [facility] medication instructions  Do the exercises provided to you by [facility]   Arrange for a responsible, adult licensed  to take you home after surgery and stay with you for 24 hours.  You will not be permitted to drive yourself home if you have received any anesthetic/sedation  Six days before surgery  Check your [facility] medication instructions  Do the exercises provided to you by [facility]   Start using Chlorhexidene (CHG) body wash if prescribed  Five days before surgery  Check your [facility] medication instructions  Do the exercises provided to you by [facility]   Continue to use CHG body wash if prescribed  Three days before surgery  Check your [facility] medication instructions  Do the exercises provided to you by [facility]   Continue to use CHG body wash if prescribed  Two days before surgery  Check your [facility] medication instructions  Do the exercises provided to you by [facility]   Continue to use CHG body wash if prescribed    The Day before Surgery       Check your [facility] medication and all other [facility] instructions including when to stop eating and drinking  You will be called with your arrival time for surgery in the late afternoon.  If you do not receive a call please reach out to your surgeon's office.  Do not smoke or drink 24 hours before surgery  Prepare items to bring with you to the hospital  Shower with your chlorhexidine wash if prescribed  Brush your teeth and use your chlorhexidine dental rinse if prescribed    The Day of Surgery       Check your [facility]  medication instructions  Ensure you follow the instructions for when to stop eating and drinking  Shower, if prescribed use CHG.  Do not apply any lotions, creams, moisturizers, perfume or deodorant  Brush your teeth and use your CHG dental rinse if prescribed  Wear loose comfortable clothing  Avoid make-up  Remove  jewelry and piercings, consider professional piercing removal with a plastic spacer if needed  Bring photo ID and Insurance card  Bring an accurate medication list that includes medication dose, frequency and allergies  Bring a copy of your advanced directives (will, health care power of )  Bring any devices and controllers as well as medical devices you have been provided with for surgery (CPAP, slings, braces, etc.)  Dentures, eyeglasses, and contacts will be removed before surgery, please bring cases for contacts or glasses

## 2025-05-28 ENCOUNTER — HOSPITAL ENCOUNTER (OUTPATIENT)
Dept: RADIOLOGY | Facility: HOSPITAL | Age: 80
Discharge: HOME | End: 2025-05-28
Payer: MEDICARE

## 2025-05-28 ENCOUNTER — HOSPITAL ENCOUNTER (OUTPATIENT)
Dept: CARDIOLOGY | Facility: HOSPITAL | Age: 80
Discharge: HOME | End: 2025-05-28
Payer: MEDICARE

## 2025-05-28 ENCOUNTER — APPOINTMENT (OUTPATIENT)
Dept: CARDIOLOGY | Facility: CLINIC | Age: 80
End: 2025-05-28
Payer: MEDICARE

## 2025-05-28 ENCOUNTER — TELEPHONE (OUTPATIENT)
Dept: UROLOGY | Facility: HOSPITAL | Age: 80
End: 2025-05-28

## 2025-05-28 DIAGNOSIS — I25.10 CORONARY ARTERY DISEASE INVOLVING NATIVE CORONARY ARTERY OF NATIVE HEART WITHOUT ANGINA PECTORIS: ICD-10-CM

## 2025-05-28 PROCEDURE — 3430000001 HC RX 343 DIAGNOSTIC RADIOPHARMACEUTICALS

## 2025-05-28 PROCEDURE — A9502 TC99M TETROFOSMIN: HCPCS

## 2025-05-28 PROCEDURE — 93017 CV STRESS TEST TRACING ONLY: CPT

## 2025-05-28 PROCEDURE — 93018 CV STRESS TEST I&R ONLY: CPT | Performed by: INTERNAL MEDICINE

## 2025-05-28 PROCEDURE — 93016 CV STRESS TEST SUPVJ ONLY: CPT | Performed by: INTERNAL MEDICINE

## 2025-05-28 PROCEDURE — 78452 HT MUSCLE IMAGE SPECT MULT: CPT

## 2025-05-28 RX ADMIN — TETROFOSMIN 34.9 MILLICURIE: 0.23 INJECTION, POWDER, LYOPHILIZED, FOR SOLUTION INTRAVENOUS at 11:58

## 2025-05-28 RX ADMIN — TETROFOSMIN 11.1 MILLICURIE: 0.23 INJECTION, POWDER, LYOPHILIZED, FOR SOLUTION INTRAVENOUS at 10:42

## 2025-05-29 ENCOUNTER — APPOINTMENT (OUTPATIENT)
Dept: CARDIOLOGY | Facility: HOSPITAL | Age: 80
End: 2025-05-29
Payer: MEDICARE

## 2025-05-29 DIAGNOSIS — N40.1 BPH WITH URINARY OBSTRUCTION: ICD-10-CM

## 2025-05-29 DIAGNOSIS — N30.00 ACUTE CYSTITIS WITHOUT HEMATURIA: ICD-10-CM

## 2025-05-29 DIAGNOSIS — N13.8 BPH WITH URINARY OBSTRUCTION: ICD-10-CM

## 2025-05-29 DIAGNOSIS — E11.9 CONTROLLED TYPE 2 DIABETES MELLITUS WITHOUT COMPLICATION, WITHOUT LONG-TERM CURRENT USE OF INSULIN: Primary | ICD-10-CM

## 2025-05-29 LAB — STAPHYLOCOCCUS SPEC CULT: ABNORMAL

## 2025-05-29 RX ORDER — SULFAMETHOXAZOLE AND TRIMETHOPRIM 800; 160 MG/1; MG/1
1 TABLET ORAL 2 TIMES DAILY
Qty: 14 TABLET | Refills: 0 | Status: SHIPPED | OUTPATIENT
Start: 2025-05-29 | End: 2025-06-05

## 2025-05-30 ENCOUNTER — ANESTHESIA (OUTPATIENT)
Dept: OPERATING ROOM | Facility: HOSPITAL | Age: 80
End: 2025-05-30
Payer: MEDICARE

## 2025-05-30 ENCOUNTER — HOSPITAL ENCOUNTER (INPATIENT)
Facility: HOSPITAL | Age: 80
LOS: 2 days | Discharge: HOME | DRG: 707 | End: 2025-06-01
Attending: STUDENT IN AN ORGANIZED HEALTH CARE EDUCATION/TRAINING PROGRAM | Admitting: STUDENT IN AN ORGANIZED HEALTH CARE EDUCATION/TRAINING PROGRAM
Payer: MEDICARE

## 2025-05-30 ENCOUNTER — APPOINTMENT (OUTPATIENT)
Dept: UROLOGY | Facility: CLINIC | Age: 80
End: 2025-05-30
Payer: MEDICARE

## 2025-05-30 DIAGNOSIS — N40.1 BPH WITH URINARY OBSTRUCTION: Primary | ICD-10-CM

## 2025-05-30 DIAGNOSIS — N13.8 BPH WITH URINARY OBSTRUCTION: Primary | ICD-10-CM

## 2025-05-30 DIAGNOSIS — G89.18 ACUTE POSTOPERATIVE PAIN: ICD-10-CM

## 2025-05-30 LAB
ABO GROUP (TYPE) IN BLOOD: NORMAL
ALBUMIN SERPL BCP-MCNC: 3.2 G/DL (ref 3.4–5)
ANION GAP SERPL CALC-SCNC: 15 MMOL/L (ref 10–20)
ANTIBODY SCREEN: NORMAL
BUN SERPL-MCNC: 18 MG/DL (ref 6–23)
CALCIUM SERPL-MCNC: 8.2 MG/DL (ref 8.6–10.6)
CHLORIDE SERPL-SCNC: 106 MMOL/L (ref 98–107)
CO2 SERPL-SCNC: 22 MMOL/L (ref 21–32)
CREAT SERPL-MCNC: 1.15 MG/DL (ref 0.5–1.3)
EGFRCR SERPLBLD CKD-EPI 2021: 64 ML/MIN/1.73M*2
ERYTHROCYTE [DISTWIDTH] IN BLOOD BY AUTOMATED COUNT: 14.6 % (ref 11.5–14.5)
GLUCOSE BLD MANUAL STRIP-MCNC: 152 MG/DL (ref 74–99)
GLUCOSE BLD MANUAL STRIP-MCNC: 160 MG/DL (ref 74–99)
GLUCOSE BLD MANUAL STRIP-MCNC: 162 MG/DL (ref 74–99)
GLUCOSE SERPL-MCNC: 154 MG/DL (ref 74–99)
HCT VFR BLD AUTO: 26.4 % (ref 41–52)
HGB BLD-MCNC: 7.9 G/DL (ref 13.5–17.5)
MCH RBC QN AUTO: 28.1 PG (ref 26–34)
MCHC RBC AUTO-ENTMCNC: 29.9 G/DL (ref 32–36)
MCV RBC AUTO: 94 FL (ref 80–100)
NRBC BLD-RTO: 0 /100 WBCS (ref 0–0)
PHOSPHATE SERPL-MCNC: 4.8 MG/DL (ref 2.5–4.9)
PLATELET # BLD AUTO: 488 X10*3/UL (ref 150–450)
POTASSIUM SERPL-SCNC: 5.2 MMOL/L (ref 3.5–5.3)
RBC # BLD AUTO: 2.81 X10*6/UL (ref 4.5–5.9)
RH FACTOR (ANTIGEN D): NORMAL
SODIUM SERPL-SCNC: 138 MMOL/L (ref 136–145)
WBC # BLD AUTO: 15.8 X10*3/UL (ref 4.4–11.3)

## 2025-05-30 PROCEDURE — 2500000001 HC RX 250 WO HCPCS SELF ADMINISTERED DRUGS (ALT 637 FOR MEDICARE OP): Performed by: STUDENT IN AN ORGANIZED HEALTH CARE EDUCATION/TRAINING PROGRAM

## 2025-05-30 PROCEDURE — 82947 ASSAY GLUCOSE BLOOD QUANT: CPT

## 2025-05-30 PROCEDURE — 55867 LAPS SURG PRST8ECT SMPL STOT: CPT | Performed by: STUDENT IN AN ORGANIZED HEALTH CARE EDUCATION/TRAINING PROGRAM

## 2025-05-30 PROCEDURE — 7100000002 HC RECOVERY ROOM TIME - EACH INCREMENTAL 1 MINUTE: Performed by: STUDENT IN AN ORGANIZED HEALTH CARE EDUCATION/TRAINING PROGRAM

## 2025-05-30 PROCEDURE — 2720000007 HC OR 272 NO HCPCS: Performed by: STUDENT IN AN ORGANIZED HEALTH CARE EDUCATION/TRAINING PROGRAM

## 2025-05-30 PROCEDURE — 7100000011 HC EXTENDED STAY RECOVERY HOURLY - NURSING UNIT

## 2025-05-30 PROCEDURE — 0VT04ZZ RESECTION OF PROSTATE, PERCUTANEOUS ENDOSCOPIC APPROACH: ICD-10-PCS | Performed by: STUDENT IN AN ORGANIZED HEALTH CARE EDUCATION/TRAINING PROGRAM

## 2025-05-30 PROCEDURE — 2500000004 HC RX 250 GENERAL PHARMACY W/ HCPCS (ALT 636 FOR OP/ED): Performed by: NURSE ANESTHETIST, CERTIFIED REGISTERED

## 2025-05-30 PROCEDURE — 8E0W4CZ ROBOTIC ASSISTED PROCEDURE OF TRUNK REGION, PERCUTANEOUS ENDOSCOPIC APPROACH: ICD-10-PCS | Performed by: STUDENT IN AN ORGANIZED HEALTH CARE EDUCATION/TRAINING PROGRAM

## 2025-05-30 PROCEDURE — 85027 COMPLETE CBC AUTOMATED: CPT | Performed by: STUDENT IN AN ORGANIZED HEALTH CARE EDUCATION/TRAINING PROGRAM

## 2025-05-30 PROCEDURE — 55867 LAPS SURG PRST8ECT SMPL STOT: CPT | Performed by: PHYSICIAN ASSISTANT

## 2025-05-30 PROCEDURE — 88307 TISSUE EXAM BY PATHOLOGIST: CPT | Mod: TC,SUR,WESLAB | Performed by: STUDENT IN AN ORGANIZED HEALTH CARE EDUCATION/TRAINING PROGRAM

## 2025-05-30 PROCEDURE — 3600000018 HC OR TIME - INITIAL BASE CHARGE - PROCEDURE LEVEL SIX: Performed by: STUDENT IN AN ORGANIZED HEALTH CARE EDUCATION/TRAINING PROGRAM

## 2025-05-30 PROCEDURE — 86901 BLOOD TYPING SEROLOGIC RH(D): CPT | Performed by: STUDENT IN AN ORGANIZED HEALTH CARE EDUCATION/TRAINING PROGRAM

## 2025-05-30 PROCEDURE — 80069 RENAL FUNCTION PANEL: CPT | Performed by: STUDENT IN AN ORGANIZED HEALTH CARE EDUCATION/TRAINING PROGRAM

## 2025-05-30 PROCEDURE — 2500000004 HC RX 250 GENERAL PHARMACY W/ HCPCS (ALT 636 FOR OP/ED): Performed by: STUDENT IN AN ORGANIZED HEALTH CARE EDUCATION/TRAINING PROGRAM

## 2025-05-30 PROCEDURE — P9045 ALBUMIN (HUMAN), 5%, 250 ML: HCPCS | Mod: JZ,TB | Performed by: NURSE ANESTHETIST, CERTIFIED REGISTERED

## 2025-05-30 PROCEDURE — 88307 TISSUE EXAM BY PATHOLOGIST: CPT | Performed by: PATHOLOGY

## 2025-05-30 PROCEDURE — 85027 COMPLETE CBC AUTOMATED: CPT

## 2025-05-30 PROCEDURE — 2500000005 HC RX 250 GENERAL PHARMACY W/O HCPCS: Performed by: STUDENT IN AN ORGANIZED HEALTH CARE EDUCATION/TRAINING PROGRAM

## 2025-05-30 PROCEDURE — 7100000001 HC RECOVERY ROOM TIME - INITIAL BASE CHARGE: Performed by: STUDENT IN AN ORGANIZED HEALTH CARE EDUCATION/TRAINING PROGRAM

## 2025-05-30 PROCEDURE — 2500000005 HC RX 250 GENERAL PHARMACY W/O HCPCS: Performed by: ANESTHESIOLOGY

## 2025-05-30 PROCEDURE — 36415 COLL VENOUS BLD VENIPUNCTURE: CPT | Performed by: STUDENT IN AN ORGANIZED HEALTH CARE EDUCATION/TRAINING PROGRAM

## 2025-05-30 PROCEDURE — 3600000017 HC OR TIME - EACH INCREMENTAL 1 MINUTE - PROCEDURE LEVEL SIX: Performed by: STUDENT IN AN ORGANIZED HEALTH CARE EDUCATION/TRAINING PROGRAM

## 2025-05-30 PROCEDURE — 2780000003 HC OR 278 NO HCPCS: Performed by: STUDENT IN AN ORGANIZED HEALTH CARE EDUCATION/TRAINING PROGRAM

## 2025-05-30 PROCEDURE — 3700000001 HC GENERAL ANESTHESIA TIME - INITIAL BASE CHARGE: Performed by: STUDENT IN AN ORGANIZED HEALTH CARE EDUCATION/TRAINING PROGRAM

## 2025-05-30 PROCEDURE — 3700000002 HC GENERAL ANESTHESIA TIME - EACH INCREMENTAL 1 MINUTE: Performed by: STUDENT IN AN ORGANIZED HEALTH CARE EDUCATION/TRAINING PROGRAM

## 2025-05-30 RX ORDER — ROCURONIUM BROMIDE 10 MG/ML
INJECTION, SOLUTION INTRAVENOUS AS NEEDED
Status: DISCONTINUED | OUTPATIENT
Start: 2025-05-30 | End: 2025-05-30

## 2025-05-30 RX ORDER — SODIUM CHLORIDE 0.9 G/100ML
INJECTION, SOLUTION IRRIGATION AS NEEDED
Status: DISCONTINUED | OUTPATIENT
Start: 2025-05-30 | End: 2025-05-30 | Stop reason: HOSPADM

## 2025-05-30 RX ORDER — NALOXONE HYDROCHLORIDE 0.4 MG/ML
0.2 INJECTION, SOLUTION INTRAMUSCULAR; INTRAVENOUS; SUBCUTANEOUS EVERY 5 MIN PRN
Status: DISCONTINUED | OUTPATIENT
Start: 2025-05-30 | End: 2025-06-01 | Stop reason: HOSPADM

## 2025-05-30 RX ORDER — LIDOCAINE HCL/PF 100 MG/5ML
SYRINGE (ML) INTRAVENOUS AS NEEDED
Status: DISCONTINUED | OUTPATIENT
Start: 2025-05-30 | End: 2025-05-30

## 2025-05-30 RX ORDER — SODIUM CHLORIDE, SODIUM LACTATE, POTASSIUM CHLORIDE, CALCIUM CHLORIDE 600; 310; 30; 20 MG/100ML; MG/100ML; MG/100ML; MG/100ML
100 INJECTION, SOLUTION INTRAVENOUS CONTINUOUS
Status: DISCONTINUED | OUTPATIENT
Start: 2025-05-30 | End: 2025-05-30 | Stop reason: HOSPADM

## 2025-05-30 RX ORDER — SODIUM CHLORIDE 9 MG/ML
120 INJECTION, SOLUTION INTRAVENOUS CONTINUOUS
Status: DISCONTINUED | OUTPATIENT
Start: 2025-05-30 | End: 2025-05-31

## 2025-05-30 RX ORDER — BUPIVACAINE HYDROCHLORIDE 5 MG/ML
INJECTION, SOLUTION EPIDURAL; INTRACAUDAL; PERINEURAL AS NEEDED
Status: DISCONTINUED | OUTPATIENT
Start: 2025-05-30 | End: 2025-05-30 | Stop reason: HOSPADM

## 2025-05-30 RX ORDER — FENTANYL CITRATE 50 UG/ML
INJECTION, SOLUTION INTRAMUSCULAR; INTRAVENOUS AS NEEDED
Status: DISCONTINUED | OUTPATIENT
Start: 2025-05-30 | End: 2025-05-30

## 2025-05-30 RX ORDER — PROPOFOL 10 MG/ML
INJECTION, EMULSION INTRAVENOUS AS NEEDED
Status: DISCONTINUED | OUTPATIENT
Start: 2025-05-30 | End: 2025-05-30

## 2025-05-30 RX ORDER — ONDANSETRON HYDROCHLORIDE 2 MG/ML
4 INJECTION, SOLUTION INTRAVENOUS ONCE AS NEEDED
Status: DISCONTINUED | OUTPATIENT
Start: 2025-05-30 | End: 2025-05-30 | Stop reason: HOSPADM

## 2025-05-30 RX ORDER — OXYCODONE HYDROCHLORIDE 5 MG/1
5 TABLET ORAL EVERY 4 HOURS PRN
Status: DISCONTINUED | OUTPATIENT
Start: 2025-05-30 | End: 2025-05-30 | Stop reason: HOSPADM

## 2025-05-30 RX ORDER — POLYETHYLENE GLYCOL 3350 17 G/17G
17 POWDER, FOR SOLUTION ORAL DAILY
Status: DISCONTINUED | OUTPATIENT
Start: 2025-05-30 | End: 2025-06-01 | Stop reason: HOSPADM

## 2025-05-30 RX ORDER — ACETAMINOPHEN 325 MG/1
975 TABLET ORAL ONCE
Status: COMPLETED | OUTPATIENT
Start: 2025-05-30 | End: 2025-05-30

## 2025-05-30 RX ORDER — INSULIN LISPRO 100 [IU]/ML
0-10 INJECTION, SOLUTION INTRAVENOUS; SUBCUTANEOUS
Status: DISCONTINUED | OUTPATIENT
Start: 2025-05-30 | End: 2025-06-01 | Stop reason: HOSPADM

## 2025-05-30 RX ORDER — INSULIN LISPRO 100 [IU]/ML
0-10 INJECTION, SOLUTION INTRAVENOUS; SUBCUTANEOUS
Status: DISCONTINUED | OUTPATIENT
Start: 2025-05-30 | End: 2025-05-30

## 2025-05-30 RX ORDER — PHENYLEPHRINE HCL IN 0.9% NACL 0.4MG/10ML
SYRINGE (ML) INTRAVENOUS AS NEEDED
Status: DISCONTINUED | OUTPATIENT
Start: 2025-05-30 | End: 2025-05-30

## 2025-05-30 RX ORDER — ATORVASTATIN CALCIUM 80 MG/1
80 TABLET, FILM COATED ORAL EVERY EVENING
Status: DISCONTINUED | OUTPATIENT
Start: 2025-05-31 | End: 2025-06-01 | Stop reason: HOSPADM

## 2025-05-30 RX ORDER — GLYCOPYRROLATE 0.2 MG/ML
INJECTION INTRAMUSCULAR; INTRAVENOUS AS NEEDED
Status: DISCONTINUED | OUTPATIENT
Start: 2025-05-30 | End: 2025-05-30

## 2025-05-30 RX ORDER — ONDANSETRON HYDROCHLORIDE 2 MG/ML
4 INJECTION, SOLUTION INTRAVENOUS EVERY 8 HOURS PRN
Status: DISCONTINUED | OUTPATIENT
Start: 2025-05-30 | End: 2025-06-01 | Stop reason: HOSPADM

## 2025-05-30 RX ORDER — OXYCODONE HYDROCHLORIDE 5 MG/1
5 TABLET ORAL EVERY 4 HOURS PRN
Refills: 0 | Status: DISCONTINUED | OUTPATIENT
Start: 2025-05-30 | End: 2025-06-01 | Stop reason: HOSPADM

## 2025-05-30 RX ORDER — PHENYLEPHRINE 10 MG/250 ML(40 MCG/ML)IN 0.9 % SOD.CHLORIDE INTRAVENOUS
CONTINUOUS PRN
Status: DISCONTINUED | OUTPATIENT
Start: 2025-05-30 | End: 2025-05-30

## 2025-05-30 RX ORDER — WATER 1 ML/ML
INJECTION IRRIGATION AS NEEDED
Status: DISCONTINUED | OUTPATIENT
Start: 2025-05-30 | End: 2025-05-30 | Stop reason: HOSPADM

## 2025-05-30 RX ORDER — LIDOCAINE HYDROCHLORIDE 10 MG/ML
0.1 INJECTION, SOLUTION EPIDURAL; INFILTRATION; INTRACAUDAL; PERINEURAL ONCE
Status: DISCONTINUED | OUTPATIENT
Start: 2025-05-30 | End: 2025-05-30 | Stop reason: HOSPADM

## 2025-05-30 RX ORDER — ALVIMOPAN 12 MG/1
12 CAPSULE ORAL ONCE
Status: COMPLETED | OUTPATIENT
Start: 2025-05-30 | End: 2025-05-30

## 2025-05-30 RX ORDER — HYDROMORPHONE HYDROCHLORIDE 1 MG/ML
INJECTION, SOLUTION INTRAMUSCULAR; INTRAVENOUS; SUBCUTANEOUS AS NEEDED
Status: DISCONTINUED | OUTPATIENT
Start: 2025-05-30 | End: 2025-05-30

## 2025-05-30 RX ORDER — ALBUMIN HUMAN 50 G/1000ML
SOLUTION INTRAVENOUS AS NEEDED
Status: DISCONTINUED | OUTPATIENT
Start: 2025-05-30 | End: 2025-05-30

## 2025-05-30 RX ORDER — HYDROMORPHONE HYDROCHLORIDE 0.2 MG/ML
0.2 INJECTION INTRAMUSCULAR; INTRAVENOUS; SUBCUTANEOUS EVERY 5 MIN PRN
Status: DISCONTINUED | OUTPATIENT
Start: 2025-05-30 | End: 2025-05-30 | Stop reason: HOSPADM

## 2025-05-30 RX ORDER — OXYCODONE HYDROCHLORIDE 5 MG/1
5 TABLET ORAL EVERY 6 HOURS PRN
Status: DISCONTINUED | OUTPATIENT
Start: 2025-05-30 | End: 2025-05-30

## 2025-05-30 RX ORDER — ONDANSETRON 4 MG/1
4 TABLET, FILM COATED ORAL EVERY 8 HOURS PRN
Status: DISCONTINUED | OUTPATIENT
Start: 2025-05-30 | End: 2025-06-01 | Stop reason: HOSPADM

## 2025-05-30 RX ORDER — ACETAMINOPHEN 325 MG/1
975 TABLET ORAL EVERY 6 HOURS
Status: DISCONTINUED | OUTPATIENT
Start: 2025-05-30 | End: 2025-06-01 | Stop reason: HOSPADM

## 2025-05-30 RX ORDER — OXYCODONE HYDROCHLORIDE 5 MG/1
10 TABLET ORAL EVERY 4 HOURS PRN
Status: DISCONTINUED | OUTPATIENT
Start: 2025-05-30 | End: 2025-06-01 | Stop reason: HOSPADM

## 2025-05-30 RX ORDER — ONDANSETRON HYDROCHLORIDE 2 MG/ML
INJECTION, SOLUTION INTRAVENOUS AS NEEDED
Status: DISCONTINUED | OUTPATIENT
Start: 2025-05-30 | End: 2025-05-30

## 2025-05-30 RX ORDER — GABAPENTIN 300 MG/1
300 CAPSULE ORAL ONCE
Status: COMPLETED | OUTPATIENT
Start: 2025-05-30 | End: 2025-05-30

## 2025-05-30 RX ADMIN — ALBUMIN HUMAN 250 ML: 0.05 INJECTION, SOLUTION INTRAVENOUS at 12:13

## 2025-05-30 RX ADMIN — ALVIMOPAN 12 MG: 12 CAPSULE ORAL at 07:07

## 2025-05-30 RX ADMIN — Medication 80 MCG: at 09:00

## 2025-05-30 RX ADMIN — PROPOFOL 30 MG: 10 INJECTION, EMULSION INTRAVENOUS at 13:22

## 2025-05-30 RX ADMIN — ONDANSETRON 4 MG: 2 INJECTION, SOLUTION INTRAMUSCULAR; INTRAVENOUS at 11:18

## 2025-05-30 RX ADMIN — Medication 80 MCG: at 09:48

## 2025-05-30 RX ADMIN — ROCURONIUM BROMIDE 50 MG: 10 INJECTION, SOLUTION INTRAVENOUS at 07:27

## 2025-05-30 RX ADMIN — Medication 120 MCG: at 11:57

## 2025-05-30 RX ADMIN — Medication 120 MCG: at 07:50

## 2025-05-30 RX ADMIN — Medication 80 MCG: at 14:02

## 2025-05-30 RX ADMIN — GLYCOPYRROLATE 0.2 MG: 0.2 INJECTION INTRAMUSCULAR; INTRAVENOUS at 08:28

## 2025-05-30 RX ADMIN — HYDROMORPHONE HYDROCHLORIDE 0.25 MG: 1 INJECTION, SOLUTION INTRAMUSCULAR; INTRAVENOUS; SUBCUTANEOUS at 08:40

## 2025-05-30 RX ADMIN — ROCURONIUM BROMIDE 10 MG: 10 INJECTION, SOLUTION INTRAVENOUS at 09:26

## 2025-05-30 RX ADMIN — FENTANYL CITRATE 100 MCG: 50 INJECTION, SOLUTION INTRAMUSCULAR; INTRAVENOUS at 07:27

## 2025-05-30 RX ADMIN — GABAPENTIN 300 MG: 300 CAPSULE ORAL at 07:07

## 2025-05-30 RX ADMIN — CEFAZOLIN 2 G: 1 INJECTION, POWDER, FOR SOLUTION INTRAMUSCULAR; INTRAVENOUS at 07:40

## 2025-05-30 RX ADMIN — PHENYLEPHRINE-NACL IV SOLUTION 10 MG/250ML-0.9% 0.6 MCG/KG/MIN: 10-0.9/25 SOLUTION at 12:48

## 2025-05-30 RX ADMIN — Medication 120 MCG: at 07:40

## 2025-05-30 RX ADMIN — HYDROMORPHONE HYDROCHLORIDE 0.5 MG: 1 INJECTION, SOLUTION INTRAMUSCULAR; INTRAVENOUS; SUBCUTANEOUS at 13:25

## 2025-05-30 RX ADMIN — Medication 160 MCG: at 12:12

## 2025-05-30 RX ADMIN — SODIUM CHLORIDE, SODIUM LACTATE, POTASSIUM CHLORIDE, AND CALCIUM CHLORIDE: 600; 310; 30; 20 INJECTION, SOLUTION INTRAVENOUS at 07:24

## 2025-05-30 RX ADMIN — HYDROMORPHONE HYDROCHLORIDE 0.25 MG: 1 INJECTION, SOLUTION INTRAMUSCULAR; INTRAVENOUS; SUBCUTANEOUS at 11:16

## 2025-05-30 RX ADMIN — PROPOFOL 20 MG: 10 INJECTION, EMULSION INTRAVENOUS at 13:28

## 2025-05-30 RX ADMIN — ROCURONIUM BROMIDE 20 MG: 10 INJECTION, SOLUTION INTRAVENOUS at 08:17

## 2025-05-30 RX ADMIN — Medication 160 MCG: at 07:56

## 2025-05-30 RX ADMIN — Medication 160 MCG: at 09:12

## 2025-05-30 RX ADMIN — ROCURONIUM BROMIDE 10 MG: 10 INJECTION, SOLUTION INTRAVENOUS at 12:50

## 2025-05-30 RX ADMIN — SODIUM CHLORIDE 120 ML/HR: 0.9 INJECTION, SOLUTION INTRAVENOUS at 18:11

## 2025-05-30 RX ADMIN — CEFAZOLIN 2 G: 1 INJECTION, POWDER, FOR SOLUTION INTRAMUSCULAR; INTRAVENOUS at 11:40

## 2025-05-30 RX ADMIN — ACETAMINOPHEN 975 MG: 325 TABLET ORAL at 07:07

## 2025-05-30 RX ADMIN — PROPOFOL 150 MG: 10 INJECTION, EMULSION INTRAVENOUS at 07:27

## 2025-05-30 RX ADMIN — ROCURONIUM BROMIDE 20 MG: 10 INJECTION, SOLUTION INTRAVENOUS at 11:35

## 2025-05-30 RX ADMIN — Medication 80 MCG: at 12:52

## 2025-05-30 RX ADMIN — Medication 120 MCG: at 08:24

## 2025-05-30 RX ADMIN — ROCURONIUM BROMIDE 20 MG: 10 INJECTION, SOLUTION INTRAVENOUS at 12:18

## 2025-05-30 RX ADMIN — Medication 8 L/MIN: at 14:50

## 2025-05-30 RX ADMIN — Medication 80 MCG: at 09:21

## 2025-05-30 RX ADMIN — Medication 120 MCG: at 11:48

## 2025-05-30 RX ADMIN — ROCURONIUM BROMIDE 20 MG: 10 INJECTION, SOLUTION INTRAVENOUS at 10:36

## 2025-05-30 RX ADMIN — Medication 80 MCG: at 12:34

## 2025-05-30 RX ADMIN — LIDOCAINE HYDROCHLORIDE 50 MG: 20 INJECTION, SOLUTION INTRAVENOUS at 07:27

## 2025-05-30 SDOH — SOCIAL STABILITY: SOCIAL INSECURITY: WITHIN THE LAST YEAR, HAVE YOU BEEN HUMILIATED OR EMOTIONALLY ABUSED IN OTHER WAYS BY YOUR PARTNER OR EX-PARTNER?: NO

## 2025-05-30 SDOH — ECONOMIC STABILITY: HOUSING INSECURITY: IN THE PAST 12 MONTHS, HOW MANY TIMES HAVE YOU MOVED WHERE YOU WERE LIVING?: 0

## 2025-05-30 SDOH — SOCIAL STABILITY: SOCIAL INSECURITY: HAVE YOU HAD ANY THOUGHTS OF HARMING ANYONE ELSE?: NO

## 2025-05-30 SDOH — ECONOMIC STABILITY: HOUSING INSECURITY: IN THE LAST 12 MONTHS, WAS THERE A TIME WHEN YOU WERE NOT ABLE TO PAY THE MORTGAGE OR RENT ON TIME?: NO

## 2025-05-30 SDOH — ECONOMIC STABILITY: INCOME INSECURITY: IN THE PAST 12 MONTHS HAS THE ELECTRIC, GAS, OIL, OR WATER COMPANY THREATENED TO SHUT OFF SERVICES IN YOUR HOME?: NO

## 2025-05-30 SDOH — ECONOMIC STABILITY: FOOD INSECURITY: WITHIN THE PAST 12 MONTHS, YOU WORRIED THAT YOUR FOOD WOULD RUN OUT BEFORE YOU GOT THE MONEY TO BUY MORE.: NEVER TRUE

## 2025-05-30 SDOH — SOCIAL STABILITY: SOCIAL INSECURITY: WITHIN THE LAST YEAR, HAVE YOU BEEN AFRAID OF YOUR PARTNER OR EX-PARTNER?: NO

## 2025-05-30 SDOH — ECONOMIC STABILITY: TRANSPORTATION INSECURITY: IN THE PAST 12 MONTHS, HAS LACK OF TRANSPORTATION KEPT YOU FROM MEDICAL APPOINTMENTS OR FROM GETTING MEDICATIONS?: NO

## 2025-05-30 SDOH — SOCIAL STABILITY: SOCIAL INSECURITY: DOES ANYONE TRY TO KEEP YOU FROM HAVING/CONTACTING OTHER FRIENDS OR DOING THINGS OUTSIDE YOUR HOME?: NO

## 2025-05-30 SDOH — ECONOMIC STABILITY: HOUSING INSECURITY: AT ANY TIME IN THE PAST 12 MONTHS, WERE YOU HOMELESS OR LIVING IN A SHELTER (INCLUDING NOW)?: NO

## 2025-05-30 SDOH — ECONOMIC STABILITY: FOOD INSECURITY: WITHIN THE PAST 12 MONTHS, THE FOOD YOU BOUGHT JUST DIDN'T LAST AND YOU DIDN'T HAVE MONEY TO GET MORE.: NEVER TRUE

## 2025-05-30 SDOH — SOCIAL STABILITY: SOCIAL INSECURITY: HAS ANYONE EVER THREATENED TO HURT YOUR FAMILY OR YOUR PETS?: NO

## 2025-05-30 SDOH — ECONOMIC STABILITY: FOOD INSECURITY: HOW HARD IS IT FOR YOU TO PAY FOR THE VERY BASICS LIKE FOOD, HOUSING, MEDICAL CARE, AND HEATING?: NOT HARD AT ALL

## 2025-05-30 SDOH — SOCIAL STABILITY: SOCIAL INSECURITY: ABUSE: ADULT

## 2025-05-30 SDOH — SOCIAL STABILITY: SOCIAL INSECURITY: WERE YOU ABLE TO COMPLETE ALL THE BEHAVIORAL HEALTH SCREENINGS?: YES

## 2025-05-30 SDOH — SOCIAL STABILITY: SOCIAL INSECURITY: DO YOU FEEL UNSAFE GOING BACK TO THE PLACE WHERE YOU ARE LIVING?: NO

## 2025-05-30 SDOH — SOCIAL STABILITY: SOCIAL INSECURITY: ARE YOU OR HAVE YOU BEEN THREATENED OR ABUSED PHYSICALLY, EMOTIONALLY, OR SEXUALLY BY ANYONE?: NO

## 2025-05-30 SDOH — SOCIAL STABILITY: SOCIAL INSECURITY: HAVE YOU HAD THOUGHTS OF HARMING ANYONE ELSE?: NO

## 2025-05-30 SDOH — SOCIAL STABILITY: SOCIAL INSECURITY: DO YOU FEEL ANYONE HAS EXPLOITED OR TAKEN ADVANTAGE OF YOU FINANCIALLY OR OF YOUR PERSONAL PROPERTY?: NO

## 2025-05-30 SDOH — SOCIAL STABILITY: SOCIAL INSECURITY: ARE THERE ANY APPARENT SIGNS OF INJURIES/BEHAVIORS THAT COULD BE RELATED TO ABUSE/NEGLECT?: NO

## 2025-05-30 ASSESSMENT — COLUMBIA-SUICIDE SEVERITY RATING SCALE - C-SSRS
2. HAVE YOU ACTUALLY HAD ANY THOUGHTS OF KILLING YOURSELF?: NO
6. HAVE YOU EVER DONE ANYTHING, STARTED TO DO ANYTHING, OR PREPARED TO DO ANYTHING TO END YOUR LIFE?: NO
1. IN THE PAST MONTH, HAVE YOU WISHED YOU WERE DEAD OR WISHED YOU COULD GO TO SLEEP AND NOT WAKE UP?: NO

## 2025-05-30 ASSESSMENT — ACTIVITIES OF DAILY LIVING (ADL)
HEARING - RIGHT EAR: FUNCTIONAL
TOILETING: INDEPENDENT
BATHING: INDEPENDENT
FEEDING YOURSELF: INDEPENDENT
PATIENT'S MEMORY ADEQUATE TO SAFELY COMPLETE DAILY ACTIVITIES?: YES
JUDGMENT_ADEQUATE_SAFELY_COMPLETE_DAILY_ACTIVITIES: YES
HEARING - LEFT EAR: FUNCTIONAL
ADEQUATE_TO_COMPLETE_ADL: YES
GROOMING: INDEPENDENT
LACK_OF_TRANSPORTATION: NO
LACK_OF_TRANSPORTATION: NO
WALKS IN HOME: INDEPENDENT
DRESSING YOURSELF: INDEPENDENT

## 2025-05-30 ASSESSMENT — PAIN - FUNCTIONAL ASSESSMENT
PAIN_FUNCTIONAL_ASSESSMENT: 0-10

## 2025-05-30 ASSESSMENT — COGNITIVE AND FUNCTIONAL STATUS - GENERAL
DAILY ACTIVITIY SCORE: 24
MOBILITY SCORE: 24
PATIENT BASELINE BEDBOUND: NO

## 2025-05-30 ASSESSMENT — PATIENT HEALTH QUESTIONNAIRE - PHQ9
2. FEELING DOWN, DEPRESSED OR HOPELESS: NOT AT ALL
SUM OF ALL RESPONSES TO PHQ9 QUESTIONS 1 & 2: 0
1. LITTLE INTEREST OR PLEASURE IN DOING THINGS: NOT AT ALL

## 2025-05-30 ASSESSMENT — PAIN SCALES - GENERAL
PAINLEVEL_OUTOF10: 0 - NO PAIN
PAIN_LEVEL: 0
PAINLEVEL_OUTOF10: 0 - NO PAIN

## 2025-05-30 ASSESSMENT — LIFESTYLE VARIABLES
AUDIT-C TOTAL SCORE: 0
HOW MANY STANDARD DRINKS CONTAINING ALCOHOL DO YOU HAVE ON A TYPICAL DAY: PATIENT DOES NOT DRINK
HOW OFTEN DO YOU HAVE A DRINK CONTAINING ALCOHOL: NEVER
SKIP TO QUESTIONS 9-10: 1
HOW OFTEN DO YOU HAVE 6 OR MORE DRINKS ON ONE OCCASION: NEVER
AUDIT-C TOTAL SCORE: 0

## 2025-05-30 NOTE — ANESTHESIA PREPROCEDURE EVALUATION
"Patient: Gary Pineda \"Juan\"    Procedure Information       Anesthesia Start Date/Time: 05/30/25 0725    Procedure: PROSTATECTOMY, ROBOT-ASSISTED    Location: [facility] OR 02 / [facility]    Surgeons: Samaria Garcia MD            Relevant Problems   Cardiac   (+) Mixed hyperlipidemia   (+) Primary hypertension      /Renal   (+) BPH with urinary obstruction   (+) Benign prostatic hyperplasia with lower urinary tract symptoms      Endocrine   (+) Controlled type 2 diabetes mellitus without complication, without long-term current use of insulin   (+) Type 2 diabetes mellitus with hyperglycemia, without long-term current use of insulin      Hematology   (+) Acute blood loss anemia      Musculoskeletal   (+) Primary osteoarthritis of left knee      ID   (+) Tinea cruris      Skin   (+) Eczema   (+) 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 : N  Date of Last Liquid: 05/30/25  Time of Last Liquid: 0400  Date of Last Solid: 05/30/25  Time of Last Solid: 2200  Last Intake Type: Clear fluids  Time of Last Void: 0400         Physical Exam    Airway  Mallampati: III  Neck ROM: full     Cardiovascular    Dental    Pulmonary    Abdominal            Anesthesia Plan    History of general anesthesia?: yes  History of complications of general anesthesia?: no    ASA 3     general     intravenous induction   Anesthetic plan and risks discussed with patient and spouse.  Use of blood products discussed with patient and spouse who.      "

## 2025-05-30 NOTE — ANESTHESIA POSTPROCEDURE EVALUATION
"Patient: Gary Pineda \"Juan\"    Procedure Summary       Date: 05/30/25 Room / Location: [facility] OR 02 / [facility]    Anesthesia Start: 0725 Anesthesia Stop: 1454    Procedure: SIMPLE PROSTATECTOMY, ROBOT-ASSISTED Diagnosis:       BPH with urinary obstruction      (BPH with urinary obstruction [N40.1, N13.8])    Surgeons: Samaria Garcia MD Responsible Provider: Emi Ramirez MD    Anesthesia Type: general ASA Status: 3            Anesthesia Type: general    Vitals Value Taken Time   /65 05/30/25 14:53   Temp 36.2 °C (97.2 °F) 05/30/25 14:53   Pulse 92 05/30/25 14:53   Resp 21 05/30/25 14:53   SpO2 96 % 05/30/25 14:53       Anesthesia Post Evaluation    Patient location during evaluation: PACU  Patient participation: complete - patient participated  Level of consciousness: awake  Pain score: 0  Pain management: adequate  Airway patency: patent  Cardiovascular status: acceptable and stable  Respiratory status: acceptable and face mask  Hydration status: acceptable  Postoperative Nausea and Vomiting: none        There were no known notable events for this encounter.    "

## 2025-05-30 NOTE — BRIEF OP NOTE
"Date: 2025  OR Location: [facility] OR    Name: Gary Pineda \"Juan\", : 1945, Age: 80 y.o., MRN: [id number], Sex: male    Diagnosis  Pre-op Diagnosis      * BPH with urinary obstruction [N40.1, N13.8] Post-op Diagnosis     * BPH with urinary obstruction [N40.1, N13.8]     Procedures  SIMPLE PROSTATECTOMY, ROBOT-ASSISTED  [id number] - SD LAPS SURG [id number] SMPL STOT ROBOTIC ASSISTANCE      Surgeons      * Samaria Garcia - Primary    Resident/Fellow/Other Assistant:  Surgeons and Role:  * No surgeons found with a matching role *    Staff:   Circulator: [place]  Scrub Person: Darlene  Surgical Assistant: Pippa  Circulator: Fanny Girard Scrub: Heena  Relief Scrub: Abhay  Relief Circulator: Barb  Relief Scrub: Meghan    Anesthesia Staff: Anesthesiologist: Emi Ramirez MD; Blanche Rdz MD  CRNA: TABITHA StoneCRNA; MINE Moran  Frontline Breaker: LIZ Angel    Procedure Summary  Anesthesia: General  ASA: III  Estimated Blood Loss: 300 mL  Intra-op Medications:   Administrations occurring from 0645 to 1235 on 25:   Medication Name Total Dose   sodium chloride 0.9 % irrigation solution 1,000 mL   surgical lubricant gel 1 Application   sterile water irrigation solution 1,000 mL   albumin human 5 % 250 mL   fentaNYL (Sublimaze) injection 50 mcg/mL 100 mcg   glycopyrrolate (Robinul) 0.2 mg/mL injection VIAL 0.2 mg   HYDROmorphone (Dilaudid) injection 1 mg/mL 0.5 mg   LR bolus Cannot be calculated   LR bolus Cannot be calculated   lidocaine (cardiac) injection 2% prefilled syringe 50 mg   ondansetron (Zofran) 2 mg/mL injection 4 mg   phenylephrine 40 mcg/mL syringe 10 mL 1,400 mcg   propofol (Diprivan) injection 10 mg/mL 150 mg   rocuronium (ZeMuron) 50 mg/5 mL injection 140 mg   acetaminophen (Tylenol) tablet 975 mg 975 mg   alvimopan (Entereg) capsule 12 mg 12 mg   ceFAZolin (Ancef) 2 g in sodium chloride 0.9% 100 mL IV 4 g   gabapentin (Neurontin) capsule 300 mg 300 mg "              Anesthesia Record               Intraprocedure I/O Totals          Intake    LR bolus 2000.00 mL    Phenylephrine Drip 0.00 mL    The total shown is the total volume documented since Anesthesia Start was filed.    Total Intake 2000 mL       Output    Urine 0 mL    Est. Blood Loss 50 mL    Total Output 50 mL       Net    Net Volume 1950 mL          Specimen:   ID Type Source Tests Collected by Time   1 : PROSTATE Tissue PROSTATE RADICAL PROSTATECTOMY SURGICAL PATHOLOGY EXAM Samaria Garcia MD 5/30/2025 5130                  Findings: very large and obstructing prostate, asymmetric secondary to greenlight laser procedure, 124 g prostate tissue removed    Complications:  None; patient tolerated the procedure well.     Disposition: PACU - hemodynamically stable.  Condition: stable  Specimens Collected:   ID Type Source Tests Collected by Time   1 : PROSTATE Tissue PROSTATE RADICAL PROSTATECTOMY SURGICAL PATHOLOGY EXAM Samaria Garcia MD 5/30/2025 3090     Attending Attestation: I performed the procedure.    Samaria Garcia  Phone Number: [phone]

## 2025-05-30 NOTE — ANESTHESIA PROCEDURE NOTES
Airway  Date/Time: 5/30/2025 7:35 AM  Reason: elective    Airway not difficult    Staffing  Performed: CRNA   Authorized by: Blanche Rdz MD    Performed by: MINE Stone  Patient location during procedure: OR    Patient Condition  Indications for airway management: anesthesia and airway protection  Patient position: sniffing  MILS maintained throughout  Sedation level: deep     Final Airway Details   Preoxygenated: yes  Final airway type: endotracheal airway  Successful airway: ETT  Cuffed: yes   Successful intubation technique: video laryngoscopy  Adjuncts used in placement: intubating stylet  Endotracheal tube insertion site: oral  Blade size: #4  ETT size (mm): 7.5  Placement verified by: capnometry   Measured from: lips  Ventilation between attempts: BVM  Number of attempts at approach: 1           coughing up streak of blood x 2 episodes 2  days ago with coughing attacks, 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

## 2025-05-31 PROBLEM — G89.18 ACUTE POSTOPERATIVE PAIN: Status: ACTIVE | Noted: 2025-05-31

## 2025-05-31 LAB
ANION GAP SERPL CALC-SCNC: 13 MMOL/L (ref 10–20)
BLOOD EXPIRATION DATE: NORMAL
BUN SERPL-MCNC: 15 MG/DL (ref 6–23)
CALCIUM SERPL-MCNC: 8 MG/DL (ref 8.6–10.6)
CHLORIDE SERPL-SCNC: 105 MMOL/L (ref 98–107)
CO2 SERPL-SCNC: 24 MMOL/L (ref 21–32)
CREAT SERPL-MCNC: 0.95 MG/DL (ref 0.5–1.3)
DISPENSE STATUS: NORMAL
EGFRCR SERPLBLD CKD-EPI 2021: 80 ML/MIN/1.73M*2
ERYTHROCYTE [DISTWIDTH] IN BLOOD BY AUTOMATED COUNT: 14.6 % (ref 11.5–14.5)
ERYTHROCYTE [DISTWIDTH] IN BLOOD BY AUTOMATED COUNT: 14.7 % (ref 11.5–14.5)
ERYTHROCYTE [DISTWIDTH] IN BLOOD BY AUTOMATED COUNT: 16.2 % (ref 11.5–14.5)
GLUCOSE BLD MANUAL STRIP-MCNC: 124 MG/DL (ref 74–99)
GLUCOSE BLD MANUAL STRIP-MCNC: 138 MG/DL (ref 74–99)
GLUCOSE BLD MANUAL STRIP-MCNC: 153 MG/DL (ref 74–99)
GLUCOSE SERPL-MCNC: 106 MG/DL (ref 74–99)
HCT VFR BLD AUTO: 21.8 % (ref 41–52)
HCT VFR BLD AUTO: 22.3 % (ref 41–52)
HCT VFR BLD AUTO: 24.8 % (ref 41–52)
HGB BLD-MCNC: 6.5 G/DL (ref 13.5–17.5)
HGB BLD-MCNC: 7.1 G/DL (ref 13.5–17.5)
HGB BLD-MCNC: 7.8 G/DL (ref 13.5–17.5)
MCH RBC QN AUTO: 28.2 PG (ref 26–34)
MCH RBC QN AUTO: 28.5 PG (ref 26–34)
MCH RBC QN AUTO: 28.6 PG (ref 26–34)
MCHC RBC AUTO-ENTMCNC: 29.8 G/DL (ref 32–36)
MCHC RBC AUTO-ENTMCNC: 31.5 G/DL (ref 32–36)
MCHC RBC AUTO-ENTMCNC: 31.8 G/DL (ref 32–36)
MCV RBC AUTO: 90 FL (ref 80–100)
MCV RBC AUTO: 90 FL (ref 80–100)
MCV RBC AUTO: 96 FL (ref 80–100)
NRBC BLD-RTO: 0 /100 WBCS (ref 0–0)
PLATELET # BLD AUTO: 318 X10*3/UL (ref 150–450)
PLATELET # BLD AUTO: 352 X10*3/UL (ref 150–450)
PLATELET # BLD AUTO: 408 X10*3/UL (ref 150–450)
POTASSIUM SERPL-SCNC: 4.1 MMOL/L (ref 3.5–5.3)
PRODUCT BLOOD TYPE: 600
PRODUCT CODE: NORMAL
RBC # BLD AUTO: 2.27 X10*6/UL (ref 4.5–5.9)
RBC # BLD AUTO: 2.49 X10*6/UL (ref 4.5–5.9)
RBC # BLD AUTO: 2.77 X10*6/UL (ref 4.5–5.9)
SODIUM SERPL-SCNC: 138 MMOL/L (ref 136–145)
UNIT ABO: NORMAL
UNIT NUMBER: NORMAL
UNIT RH: NORMAL
UNIT VOLUME: 350
WBC # BLD AUTO: 11.9 X10*3/UL (ref 4.4–11.3)
WBC # BLD AUTO: 12.4 X10*3/UL (ref 4.4–11.3)
WBC # BLD AUTO: 13.3 X10*3/UL (ref 4.4–11.3)
XM INTEP: NORMAL

## 2025-05-31 PROCEDURE — 36415 COLL VENOUS BLD VENIPUNCTURE: CPT

## 2025-05-31 PROCEDURE — 36415 COLL VENOUS BLD VENIPUNCTURE: CPT | Performed by: STUDENT IN AN ORGANIZED HEALTH CARE EDUCATION/TRAINING PROGRAM

## 2025-05-31 PROCEDURE — 85027 COMPLETE CBC AUTOMATED: CPT

## 2025-05-31 PROCEDURE — 82374 ASSAY BLOOD CARBON DIOXIDE: CPT | Performed by: STUDENT IN AN ORGANIZED HEALTH CARE EDUCATION/TRAINING PROGRAM

## 2025-05-31 PROCEDURE — 2500000004 HC RX 250 GENERAL PHARMACY W/ HCPCS (ALT 636 FOR OP/ED): Performed by: STUDENT IN AN ORGANIZED HEALTH CARE EDUCATION/TRAINING PROGRAM

## 2025-05-31 PROCEDURE — 85027 COMPLETE CBC AUTOMATED: CPT | Performed by: STUDENT IN AN ORGANIZED HEALTH CARE EDUCATION/TRAINING PROGRAM

## 2025-05-31 PROCEDURE — 36430 TRANSFUSION BLD/BLD COMPNT: CPT

## 2025-05-31 PROCEDURE — 80048 BASIC METABOLIC PNL TOTAL CA: CPT | Performed by: STUDENT IN AN ORGANIZED HEALTH CARE EDUCATION/TRAINING PROGRAM

## 2025-05-31 PROCEDURE — P9040 RBC LEUKOREDUCED IRRADIATED: HCPCS

## 2025-05-31 PROCEDURE — 82947 ASSAY GLUCOSE BLOOD QUANT: CPT

## 2025-05-31 PROCEDURE — 2500000002 HC RX 250 W HCPCS SELF ADMINISTERED DRUGS (ALT 637 FOR MEDICARE OP, ALT 636 FOR OP/ED)

## 2025-05-31 PROCEDURE — 1170000001 HC PRIVATE ONCOLOGY ROOM DAILY

## 2025-05-31 PROCEDURE — 2500000001 HC RX 250 WO HCPCS SELF ADMINISTERED DRUGS (ALT 637 FOR MEDICARE OP): Performed by: STUDENT IN AN ORGANIZED HEALTH CARE EDUCATION/TRAINING PROGRAM

## 2025-05-31 RX ORDER — BACITRACIN ZINC 500 UNIT/G
OINTMENT IN PACKET (EA) TOPICAL 3 TIMES DAILY PRN
Status: DISCONTINUED | OUTPATIENT
Start: 2025-05-31 | End: 2025-06-01 | Stop reason: HOSPADM

## 2025-05-31 RX ORDER — POLYETHYLENE GLYCOL 3350 17 G/17G
17 POWDER, FOR SOLUTION ORAL DAILY
Qty: 7 PACKET | Refills: 0 | Status: SHIPPED | OUTPATIENT
Start: 2025-05-31

## 2025-05-31 RX ORDER — OXYCODONE HYDROCHLORIDE 5 MG/1
5 TABLET ORAL EVERY 6 HOURS PRN
Qty: 9 TABLET | Refills: 0 | Status: SHIPPED | OUTPATIENT
Start: 2025-05-31

## 2025-05-31 RX ADMIN — INSULIN LISPRO 2 UNITS: 100 INJECTION, SOLUTION INTRAVENOUS; SUBCUTANEOUS at 08:16

## 2025-05-31 RX ADMIN — ATORVASTATIN CALCIUM 80 MG: 80 TABLET, FILM COATED ORAL at 21:19

## 2025-05-31 RX ADMIN — ACETAMINOPHEN 975 MG: 325 TABLET ORAL at 21:19

## 2025-05-31 RX ADMIN — POLYETHYLENE GLYCOL 3350 17 G: 17 POWDER, FOR SOLUTION ORAL at 08:16

## 2025-05-31 RX ADMIN — ACETAMINOPHEN 975 MG: 325 TABLET ORAL at 16:59

## 2025-05-31 RX ADMIN — ACETAMINOPHEN 975 MG: 325 TABLET ORAL at 09:51

## 2025-05-31 ASSESSMENT — COGNITIVE AND FUNCTIONAL STATUS - GENERAL
TURNING FROM BACK TO SIDE WHILE IN FLAT BAD: A LITTLE
DRESSING REGULAR LOWER BODY CLOTHING: A LITTLE
HELP NEEDED FOR BATHING: A LITTLE
DAILY ACTIVITIY SCORE: 22
WALKING IN HOSPITAL ROOM: A LITTLE
MOBILITY SCORE: 20
CLIMB 3 TO 5 STEPS WITH RAILING: A LITTLE
STANDING UP FROM CHAIR USING ARMS: A LITTLE
DAILY ACTIVITIY SCORE: 24
MOBILITY SCORE: 24

## 2025-05-31 ASSESSMENT — PAIN SCALES - GENERAL
PAINLEVEL_OUTOF10: 0 - NO PAIN

## 2025-05-31 ASSESSMENT — ACTIVITIES OF DAILY LIVING (ADL): LACK_OF_TRANSPORTATION: NO

## 2025-05-31 NOTE — PROGRESS NOTES
"Gary Pineda \"Donte" is a 80 y.o. male on day 1 of admission presenting with BPH with urinary obstruction.    Subjective   Feeling well overall, pain well controlled, no nausea / vomiting, endorses hunger. Notes he is hoping he can go home today       Objective     Physical Exam  Constitutional: awake and alert, resting comfortably in bed in no acute distress  Head/neck: normocephalic / atraumatic  Eyes: EOMI, sclerae anicteric  ENT: moist mucous membranes  Pulm: Breathing comfortably on room air  CV: Regular rate  Abd: Soft, moderately tender near incision, nondistended, midline incision well approximated withou drainage and with skin glue in place  : Welsh in place with gus urine  Extremities: No lower extremity edema, SCDs in place and turned on  Psych: Appropriate mood and behavior     Last Recorded Vitals  Blood pressure 136/67, pulse 100, temperature 36.5 °C (97.7 °F), temperature source Temporal, resp. rate 18, height 1.803 m (5' 11\"), weight 88 kg (194 lb 0.1 oz), SpO2 95%.  Intake/Output last 3 Shifts:  I/O last 3 completed shifts:  In: 6082.4 (69.1 mL/kg) [I.V.:1592.4 (18.1 mL/kg); IV Piggyback:4490]  Out: 4375 (49.7 mL/kg) [Urine:4250 (1.3 mL/kg/hr); Drains:25; Blood:100]  Weight: 88 kg     Relevant Results  Scheduled medications  Scheduled Medications[1]  Continuous medications  Continuous Medications[2]  PRN medications  PRN Medications[3]    Results for orders placed or performed during the hospital encounter of 05/30/25 (from the past 24 hours)   CBC   Result Value Ref Range    WBC 12.4 (H) 4.4 - 11.3 x10*3/uL    nRBC 0.0 0.0 - 0.0 /100 WBCs    RBC 2.49 (L) 4.50 - 5.90 x10*6/uL    Hemoglobin 7.1 (L) 13.5 - 17.5 g/dL    Hematocrit 22.3 (L) 41.0 - 52.0 %    MCV 90 80 - 100 fL    MCH 28.5 26.0 - 34.0 pg    MCHC 31.8 (L) 32.0 - 36.0 g/dL    RDW 14.6 (H) 11.5 - 14.5 %    Platelets 408 150 - 450 x10*3/uL   CBC   Result Value Ref Range    WBC 11.9 (H) 4.4 - 11.3 x10*3/uL    nRBC 0.0 0.0 - 0.0 /100 WBCs    " RBC 2.27 (L) 4.50 - 5.90 x10*6/uL    Hemoglobin 6.5 (LL) 13.5 - 17.5 g/dL    Hematocrit 21.8 (L) 41.0 - 52.0 %    MCV 96 80 - 100 fL    MCH 28.6 26.0 - 34.0 pg    MCHC 29.8 (L) 32.0 - 36.0 g/dL    RDW 14.7 (H) 11.5 - 14.5 %    Platelets 352 150 - 450 x10*3/uL   Basic metabolic panel   Result Value Ref Range    Glucose 106 (H) 74 - 99 mg/dL    Sodium 138 136 - 145 mmol/L    Potassium 4.1 3.5 - 5.3 mmol/L    Chloride 105 98 - 107 mmol/L    Bicarbonate 24 21 - 32 mmol/L    Anion Gap 13 10 - 20 mmol/L    Urea Nitrogen 15 6 - 23 mg/dL    Creatinine 0.95 0.50 - 1.30 mg/dL    eGFR 80 >60 mL/min/1.73m*2    Calcium 8.0 (L) 8.6 - 10.6 mg/dL   POCT GLUCOSE   Result Value Ref Range    POCT Glucose 153 (H) 74 - 99 mg/dL   Prepare RBC: 1 Units   Result Value Ref Range    PRODUCT CODE [id number]     Unit Number [id number]     Unit ABO A     Unit RH NEG     XM INTEP COMP     Dispense Status TR     Blood Expiration Date 6/6/2025 11:59:00 PM EDT     PRODUCT BLOOD TYPE 0600     UNIT VOLUME 350    POCT GLUCOSE   Result Value Ref Range    POCT Glucose 124 (H) 74 - 99 mg/dL       Imaging  Nuclear Stress Test  Result Date: 5/28/2025  1.  Negative myocardial perfusion study without evidence of inducible myocardial ischemia or prior infarction. 2. The left ventricle is normal in size. 3. Normal LV wall motion with a post-stress LV EF estimated at 64%.   I personally reviewed the images/study and I agree with the findings as stated. This study was interpreted at [place], [place].   MACRO: none   Signed by: Joyce Elizabeth 5/28/2025 1:13 PM Dictation workstation:   [id number]      Cardiology, Vascular, and Other Imaging  Cardiology Interpretation Of Nuclear Stress - See Other Report For Nuclear Portion  Result Date: 5/28/2025           [place] [address]            Phone [phone] Nuclear Treadmill Stress Test Patient Name:      SG FERNANDEZ       Ordering Provider:    [id number] SAIRAORESTES CHARLINE Study Date:        5/28/2025          Reading Physician:    [id number] Manas Zamorano MD MRN/PID:           [id number]           Supervising           [id number] Neto Hidalgo                                       Physician:             Accession#:        [id number]       Fellow: Date of Birth/Age: 1945 / 80      Fellow:                    years Gender:            M                  Nurse:                Antonina Russell RN Admit Date:                           Technician:           Kendra Ly Admission Status:                     Sonographer:          NA Height:            180.34 cm          Technologist: Weight:            87.54 kg           Additional Staff: BSA:               2.08 m2            Encounter#:           [id number] BMI:               26.92 kg/m2        Patient Location: Study Type:    CARDIOLOGY INTERPRETATION OF NUCLEAR STRESS Diagnosis/ICD: 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  Study Details: Correct procedure and correct patient verified verbally and with                ID Band checked.  Patient History: Hypertension and hyperlipidemia. ELEVATED CORONARY ARTERY CALCIUM SCORE, DIZZINESS.  Medications: ATORVASTATIN, LOSARTAN. The patient took medications as prescribed.  Patient Performance: The patient exercised to stage I on a Satish protocol for 2 minutes and 00 seconds, achieving 4.6 METS. The peak heart rate achieved was 142 bpm, which was 102 % of the age predicted target heart rate of 139 bpm. The resting blood pressure was 160/81 mmHg with a heart rate of 70 bpm. The patient's  functional capacity was average. The patient developed shortness of breath during the stress exam. The symptoms resolved with rest. The blood pressure response was normal. The test was terminated due to: achieved age predicted maximum heart rate. Patient has met the discharge criteria and is discharged to home.  Baseline ECG: Resting ECG showed normal sinus rhythm with first degree AV block and nonspecific ST-T wave changes.  Stress ECG: Stress ECG showed sinus tachycardia, with frequent premature ventricular contractions. No ST changes. Baseline rhythm was normal sinus rhythm with a prolonged VT interval with a nonspecific ST-T changes seen, pulse was about 70 bpm with a blood pressure of 160/81 mmHg. Patient only exercised up to 2 minutes and 0 seconds in this stage I Satish protocol. Heart rate increased to 142 bpm. Which was 102% of maximum pressure heart rate. Patient did have a hypertensive blood pressure response to stress test. Patient felt shortness of breath and unable to continue further test. Symptom resolved with rest. Essentially negative EKG treadmill stress test for ischemia at 100% of maximum predicted heart rate with poor exertional capacity. Please get the nuclear imaging report from radiology department. Modify risk factors. Hypertensive blood pressure response was seen.  Stress Stage Data: +----------------+---+------+-------+----+                 HR Sys BPDias BPMETS +----------------+---+------+-------+----+ Baseline [id number] 160   81          +----------------+---+------+-------+----+ Stage I         [id number]   81     4.6  +----------------+---+------+-------+----+  Recovery ECG: Recovery ECG showed normal sinus rhythm, with OCC PVCS and a decrease in the frequency of premature ventricular contractions.  +------------+---+------+-------+             HR Sys BPDias BP +------------+---+------+-------+ Recovery I  [id number]   73      +------------+---+------+-------+  Recovery II 93 154   72      +------------+---+------+-------+ Recovery III90 149   75      +------------+---+------+-------+ Recovery IV 88 149   76      +------------+---+------+-------+  Summary:  1. Adequate level of stress achieved.  2. Baseline rhythm was normal sinus rhythm with a prolonged OH interval with a nonspecific ST-T changes seen, pulse was about 70 bpm with a blood pressure of 160/81 mmHg. Patient only exercised up to 2 minutes and 0 seconds in this stage I Satish protocol. Heart rate increased to 142 bpm. Which was 102% of maximum pressure heart rate. Patient did have a hypertensive blood pressure response to stress test. Patient felt shortness of breath and unable to continue further test. Symptom resolved with rest. Essentially negative EKG treadmill stress test for ischemia at 100% of maximum predicted heart rate with poor exertional capacity. Please get the nuclear imaging report from radiology department. Modify risk factors. Hypertensive blood pressure response was seen.  3. Correlate with myocardial perfusion imaging results.  4. No clinical or electrocardiographic evidence for ischemia at submaximal workload.  5. No ischemia by ECG at submax workload.  6. Nuclear image results are reported separately.  7. Reduced sensitivity due to submaximal workload. [id number] Manas Zamorano MD Electronically signed on 5/28/2025 at 2:26:10 PM   ** Final **                   This patient has a urinary catheter   Reason for the urinary catheter remaining today? perioperative use for selected surgical procedures               Assessment & Plan  BPH with urinary obstruction    Acute postoperative pain    Gary Pineda is a 80 y.o. male who underwent a single-port robotic assisted simple prostatectomy on 5/20/25 with Dr Garcia.    He is feeling well overall but his hemoglobin has been downtrending. 1u pRBC ordered for transfusion today. Discussed with the patient that we would likely need to observe  him for at least one more day to ensure his Hgb responds appropriately to the transfusion and remains stable. He reiterated his desire to go home today but he did express understanding and agreement with the plan.    Plan:  - Ok for regular diet  - Will transfuse 1u pRBC  - Will follow-up post transfusion CBC  - Tentatively anticipate discharge home tomorrow 6/1 if patient's hemoglobin trend is reassuring  - Please page with any questions or concerns     Discussed with attending: Dr. Jose Brady MD  Urologic Surgery PGY-3  Adult Urology: [id number]    Pediatric Urology: [id number]        [1] acetaminophen, 975 mg, oral, q6h  atorvastatin, 80 mg, oral, q PM  insulin lispro, 0-10 Units, subcutaneous, Before meals & nightly  polyethylene glycol, 17 g, oral, Daily    [2]    [3] PRN medications: HYDROmorphone, naloxone, ondansetron **OR** ondansetron, oxyCODONE, oxyCODONE

## 2025-05-31 NOTE — CARE PLAN
Problem: Pain - Adult  Goal: Verbalizes/displays adequate comfort level or baseline comfort level  Outcome: Progressing     Problem: Safety - Adult  Goal: Free from fall injury  Outcome: Progressing     Problem: Discharge Planning  Goal: Discharge to home or other facility with appropriate resources  Outcome: Progressing     Problem: Chronic Conditions and Co-morbidities  Goal: Patient's chronic conditions and co-morbidity symptoms are monitored and maintained or improved  Outcome: Progressing     Problem: Nutrition  Goal: Nutrient intake appropriate for maintaining nutritional needs  Outcome: Progressing   The patient's goals for the shift include  rest and comfort    The clinical goals for the shift include VS stability and pain management    Over the shift, the patient did not make progress toward the following goals. Barriers to progression include pain. Recommendations to address these barriers include LEXI meds.

## 2025-05-31 NOTE — DISCHARGE SUMMARY
Discharge Diagnosis  BPH with urinary obstruction    Issues Requiring Follow-Up  Pathology  Cystogram and dieog removal    Test Results Pending At Discharge  Pending Labs       Order Current Status    Basic metabolic panel Collected (05/31/25 0613)    CBC Collected (05/31/25 0613)    Surgical Pathology Exam In process            Hospital Course   Gary Pineda underwent robotic simple prostatectomy with Dr. Garcia on 5/30/2025. They tolerated the procedure well and were admitted to a regular nursing floor. Hospital course was uncomplicated. He did have an element of acute blood loss anemia however did not *** require transfusion. On day of discharge, patient was tolerating a regular diet, pain was adequately controlled with PO pain medications, patient was ambulatory and diego was draining well. Hemoglobin was stable. Drain output was observed to be low and the drain was removed prior to discharge. Patient will follow-up in a few days for a cystogram and possible diego removal pending that result.      Pertinent Physical Exam At Time of Discharge  General: resting comfortably in bed  Neuro: alert and oriented, no obvious focal deficit   Head/Neck: normocephalic, atraumatic  ENT: moist mucous membranes  CV: regular rate and rhythm  Pulm: nonlabored breathing on room air, no conversational dyspnea  Abd: soft, nondistended, nontender, port site incision clean/dry/intact with suture and skin glue, drain serosanguinous  : diego catheter draining clear peach urine  MSK: BARRERA, no gross deformities  Psych: mood and behavior normal    Home Medications     Medication List      START taking these medications     oxyCODONE 5 mg immediate release tablet; Commonly known as: Roxicodone;   Take 1 tablet (5 mg) by mouth every 6 hours if needed for severe pain (7 -   10).   polyethylene glycol 17 gram packet; Commonly known as: Glycolax,   Miralax; Take 17 g by mouth once daily.     CONTINUE taking these medications      acetaminophen 325 mg tablet; Commonly known as: Tylenol; Take 2 tablets   (650 mg) by mouth every 4 hours if needed for mild pain (1 - 3).   atorvastatin 80 mg tablet; Commonly known as: Lipitor; TAKE 1 TABLET (80   MG) BY MOUTH ONCE DAILY IN THE EVENING.   * chlorhexidine 4 % external liquid; Commonly known as: Hibiclens; Apply   1 Application topically once daily for 5 days. Use per CPM/PAT provided   instructions   cholecalciferol 25 mcg (1,000 units) capsule; Commonly known as: Vitamin   D-3   empagliflozin 25 mg tablet; Commonly known as: Jardiance; Take 1 tablet   (25 mg) by mouth once daily.   ferrous sulfate 325 (65 Fe) mg EC tablet; Take 1 tablet by mouth once   daily with breakfast. Do not crush, chew, or split.   Januvia 100 mg tablet; Generic drug: SITagliptin phosphate; TAKE 1   TABLET BY MOUTH EVERY DAY   losartan 25 mg tablet; Commonly known as: Cozaar; TAKE 1 TABLET BY MOUTH   EVERY DAY   OneTouch Verio test strips; Generic drug: blood sugar diagnostic; FOR   TESTING ONE A DAY DX E 11.9   sulfamethoxazole-trimethoprim 800-160 mg tablet; Commonly known as:   Bactrim DS; Take 1 tablet by mouth 2 times a day for 7 days.  * This list has 1 medication(s) that are the same as other medications   prescribed for you. Read the directions carefully, and ask your doctor or   other care provider to review them with you.     ASK your doctor about these medications     * chlorhexidine 0.12 % solution; Commonly known as: Peridex; Use 15 mL   in the mouth or throat once daily for 2 doses.; Ask about: Should I take   this medication?  * This list has 1 medication(s) that are the same as other medications   prescribed for you. Read the directions carefully, and ask your doctor or   other care provider to review them with you.       Outpatient Follow-Up  Future Appointments   Date Time Provider Department Center   6/4/2025 10:45 AM RICKIE TIM   6/13/2025 10:40 AM Samaria Garcia MD [id number]  [facility]   8/5/2025  9:00 AM Kale Cazares DO [id number] [facility]   8/27/2025  9:45 AM Calli Bauer MD [id number] [facility]       Edie Hernandez MD

## 2025-05-31 NOTE — CARE PLAN
The patient's goals for the shift include      The clinical goals for the shift include safety with ambulation      Problem: Pain - Adult  Goal: Verbalizes/displays adequate comfort level or baseline comfort level  Outcome: Progressing     Problem: Safety - Adult  Goal: Free from fall injury  Outcome: Progressing     Problem: Discharge Planning  Goal: Discharge to home or other facility with appropriate resources  Outcome: Progressing     Problem: Chronic Conditions and Co-morbidities  Goal: Patient's chronic conditions and co-morbidity symptoms are monitored and maintained or improved  Outcome: Progressing     Problem: Nutrition  Goal: Nutrient intake appropriate for maintaining nutritional needs  Outcome: Progressing

## 2025-05-31 NOTE — DISCHARGE INSTRUCTIONS
DEPARTMENT OF Urology  DISCHARGE INSTRUCTIONS -- Robotic Simple Prostatectomy  Inpatient Surgery    C O N F I D E N T I A L   I N F O R M A T I O N    Gary Pineda      Call [phone] during regular daytime business hours (8:00 am - 5:00 pm) and after 5:00 pm and ask for the Urology resident with any questions or concerns.    If it is a life-threatening situation, proceed to the nearest emergency department.        Follow-up appointment:  6/4/25; 6/13/25      Thank you for the opportunity to care for you today.  Your health and healing are very important to us.  We hope we made you feel as comfortable as possible and are committed to your recovery and continued well-being.      The following is a brief overview of your robotic prostatectomy procedure. Some of the information contained on this summary may be confidential.  This information should be kept in your records and should be shared with your regular doctor.    Physicians:   Dr. Garcia    Procedure performed: removal of some of your prostate tissue  Pending Results: pathology    What to Expect During your Recovery and Home Care  Anesthesia Side Effects   You may feel sleepy, tired, or have a sore throat.   You may also feel drowsiness, dizziness, or inability to think clearly.  For your safety, do not drive, drink alcoholic beverages, take any unprescribed medication or make any important decisions for 24 hours after anesthesia.  A responsible adult should be with you for 24 hours.        Activity and Recovery    No heavy lifting greater than 10 pounds for the next 4-6 weeks. No driving until mobility has returned to normal. Do not drive or operate heavy machinery while taking narcotic pain medications as these medications can alter perception, impair judgement, and slow reaction times.  Pain Control  Unfortunately, you may experience pain after your procedure. Adequate pain management can include alternative measures to help ease your pain and that can  include over the counter Tylenol/ibuprofen or Oxycodone which can be taken as prescribed as needed for breakthrough pain. Do not take more than 4,000mg of Tylenol in a 24-hour period.      Your procedure was done robotically so you may experience gas pains from the surgery. Getting up and moving around is the best way to relieve those pains. You can also take some over the counter gas-x(simethicone).    Nausea/Vomiting   Clear liquids are best tolerated at first. Start slow, advance your diet as tolerated to normal foods. Avoid spicy, greasy, heavy foods at first. Also, you may feel nauseous or like you need to vomit if you take any type of medication on an empty stomach.  Call your physician if you are unable to eat or drink, are not passing gas and have persistent vomiting.    Signs of Bleeding   You could have some blood in your urine off and on over the next several weeks. Your urine will be light pink to yellow. Minor bleeding or drainage may occur from the surgical sites; however, excessive or consistent bleeding should be reported to your surgeon. Excessive bleeding is defined as blood that is dripping from wound, soaking you bandages, and is ketchup colored, thick with possible blood clots.  Consistent is defined as bleeding that does not stop.      Treatment/wound care:   Keep area(s) clean and dry.   It is okay to shower 24 hours after time of surgery.    Do not scrub wound(s), pat dry.    Do not submerge wound(s) in standing water until seen for follow up appointment (no tub bathing, swimming, or hot tubs).    Clean with mild soap, gentle washing, pat dry, cover with bandage as needed.    Avoid waterproof bandages.  No oils or lotions on incisions.  Staples/sutures removed in our [id number] days after the date of surgery.  Do not remove the staples/sutures on your own.    Please visually inspect your wound(s) at least once daily.  If the wound(s) are in a difficult to see location, please use a mirror or  have someone else assist with visual inspection.    Signs of Infection  Signs of infection can include fever, chills, redness around surgical incisions, green/yellow drainage from incisions, or severe abdominal pain.  If you see any of these occur, please contact your doctor's office at [phone].  Any fever higher than 100.4, especially if associated with an ill feeling, abdominal pain, chills, or nausea should be reported to your surgeon.      Assist in bowel movements/urination  Take daily stool softener you were prescribed  Increase fiber in diet  Increase water (6 to 8 glasses)  Increase walking   Can try adding over the counter MiraLAX or in extreme cases milk of magnesia.  If you have tried these methods and you are not passing gas, your bladder still feels full and you cannot have a bowel movement, please go to your nearest Emergency room/contact your physician.    Additional Instructions:   Use a small pillow to put pressure on your belly. This can make you more comfortable when you cough, laugh or do other actions.   Please go to any  lab on Wednesday 6/4/25 to have a blood draw.    CATHETER CARE  Always keep the catheters tubing and drainage bag below the level of your bladder.  Avoid loops and kinks in the catheter tubing.  NOTIFY your physician if catheter falls out or catheter seems clogged and urine is not draining.   Do not wear the small leg bag to bed you should be provided with a larger overnight bag that you should wear to bed and can hang over the side of the bed.  We recommend wearing the large bag in the shower as well as this is easy to dry, and you do not get your leg straps wet from your leg bag.   Your catheter should be secured to your upper thigh, do not allow it to hang or dangle.  You will have a Fluoroscopy study right before your post-operative appointment, which will determine if there are any leaks and your catheter can come out that day.

## 2025-05-31 NOTE — PROGRESS NOTES
05/31/25 1231   Discharge Planning   Living Arrangements Spouse/significant other   Support Systems Shelter;None;Spouse/significant other   Assistance Needed WALKER BUT DOES NOT USE IT   Type of Residence Private residence   Number of Stairs to Enter Residence 3   Number of Stairs Within Residence 0   Do you have animals or pets at home? No   Who is requesting discharge planning? Provider   Home or Post Acute Services None   Expected Discharge Disposition Home   Does the patient need discharge transport arranged? No   Financial Resource Strain   How hard is it for you to pay for the very basics like food, housing, medical care, and heating? Not hard   Housing Stability   In the last 12 months, was there a time when you were not able to pay the mortgage or rent on time? N   In the past 12 months, how many times have you moved where you were living? 0   At any time in the past 12 months, were you homeless or living in a shelter (including now)? N   Transportation Needs   In the past 12 months, has lack of transportation kept you from medical appointments or from getting medications? no   In the past 12 months, has lack of transportation kept you from meetings, work, or from getting things needed for daily living? No     5/31/25 @1237 Transitional Care Coordinator Note  Called into pt's room to introduce myself, role and to discuss discharge planning. Pt states he had no home care needs prior to this admission. Pt states he has transportation home and discharge with his wife. Pt states he is diabetic but denies oxygen use, and denies dialysis. Pt states he has a walker at home but does not use it, pt denies any other DME. Pt's PCP is Dr. Cazares. Will continue to follow.

## 2025-05-31 NOTE — OP NOTE
"SIMPLE PROSTATECTOMY, ROBOT-ASSISTED Operative Note     Date: 2025  OR Location: [facility] OR    Name: Gary Pineda \"Donte", : 1945, Age: 80 y.o., MRN: [id number], Sex: male    Diagnosis  Pre-op Diagnosis      * BPH with urinary obstruction [N40.1, N13.8] Post-op Diagnosis     * BPH with urinary obstruction [N40.1, N13.8]     Procedures  SIMPLE PROSTATECTOMY, ROBOT-ASSISTED  [id number] - MD LAPS SURG [id number] SMPL STOT ROBOTIC ASSISTANCE      Surgeons      * Samaria Garcia - Primary    Resident/Fellow/Other Assistant:  Surgeons and Role:  * No surgeons found with a matching role *    Staff:   Circulator: [place]  Scrub Person: Darlene  Surgical Assistant: Pippa  Circulator: Fanny  Relief Scrub: Heena  Relief Scrub: Abhay  Relief Circulator: Barb  Relief Scrub: [place]    Anesthesia Staff: Anesthesiologist: Emi Ramirez MD; Blanche Rdz MD  CRNA: LORENZO Stone-CRNA; MINE Moran  Frontline Breaker: LIZ Angel    Procedure Summary  Anesthesia: General  ASA: III  Estimated Blood Loss: 300 mL  Intra-op Medications:   Administrations occurring from 0645 to 1235 on 25:   Medication Name Total Dose   sodium chloride 0.9 % irrigation solution 1,000 mL   surgical lubricant gel 1 Application   sterile water irrigation solution 1,000 mL   acetaminophen (Tylenol) tablet 975 mg 975 mg   alvimopan (Entereg) capsule 12 mg 12 mg   ceFAZolin (Ancef) 2 g in sodium chloride 0.9% 100 mL IV 4 g   gabapentin (Neurontin) capsule 300 mg 300 mg   albumin human 5 % 250 mL   fentaNYL (Sublimaze) injection 50 mcg/mL 100 mcg   glycopyrrolate (Robinul) 0.2 mg/mL injection VIAL 0.2 mg   HYDROmorphone (Dilaudid) injection 1 mg/mL 0.5 mg   LR bolus Cannot be calculated   LR bolus Cannot be calculated   lidocaine (cardiac) injection 2% prefilled syringe 50 mg   ondansetron (Zofran) 2 mg/mL injection 4 mg   phenylephrine 40 mcg/mL syringe 10 mL 1,400 mcg   propofol (Diprivan) injection 10 mg/mL 150 mg " "  rocuronium (ZeMuron) 50 mg/5 mL injection 140 mg              Anesthesia Record               Intraprocedure I/O Totals          Intake    LR bolus 4000.00 mL    Phenylephrine Drip 75.68 mL    The total shown is the total volume documented since Anesthesia Start was filed.    albumin human 5 % 250.00 mL    ceFAZolin (Ancef) 2 g in sodium chloride 0.9% 100 mL .00 mL    Total Intake 4565.68 mL       Output    Urine 0 mL    Est. Blood Loss 100 mL    Total Output 100 mL       Net    Net Volume 4465.68 mL          Specimen:   ID Type Source Tests Collected by Time   1 :  Tissue PROSTATE RADICAL PROSTATECTOMY SURGICAL PATHOLOGY EXAM Samaria Garcia MD 5/30/2025 1426                 Drains and/or Catheters:   Closed/Suction Drain 1 Anterior;Right Hip Bulb 15 Fr. (Active)   Present on Admission to Healthcare Facility N 05/30/25 1450   Site Description Healing 05/31/25 0822   Dressing Status Clean 05/31/25 0822   Status To bulb suction 05/30/25 1450   Output (mL) 0 mL 05/31/25 0744       Urethral Catheter Non-latex 22 Fr. (Active)   Site Assessment Clean;Skin intact 05/31/25 0632   Collection Container Standard drainage bag 05/30/25 1450   Securement Method Securing device (Describe) 05/30/25 1450   Reason for Continuing Urinary Catheterization surgical procedures: urological/gynecological, pelvic oncology, anal, prolonged surgical procedure 05/31/25 0900   Output (mL) 850 mL 05/31/25 0744       [REMOVED] Urethral Catheter Latex 16 Fr. (Removed)       Tourniquet Times:         Implants:     Findings: very large and obstructing prostate, asymmetric secondary to greenlight laser procedure, 124 g prostate tissue removed     Indications: Gary Pineda \"Juan\" is an 80 y.o. male who is having surgery for BPH with urinary obstruction [N40.1, N13.8]. Prior history of greenlight PVP, had intermittent recurrent gross hematuria along with persistent LUTS.    The patient was seen in the preoperative area. The risks, benefits, " complications, treatment options, non-operative alternatives, expected recovery and outcomes were discussed with the patient. The possibilities of reaction to medication, pulmonary aspiration, injury to surrounding structures, bleeding, recurrent infection, the need for additional procedures, failure to diagnose a condition, and creating a complication requiring transfusion or operation were discussed with the patient. The patient concurred with the proposed plan, giving informed consent.  The site of surgery was properly noted/marked if necessary per policy. The patient has been actively warmed in preoperative area. Preoperative antibiotics have been ordered and given within 1 hours of incision. Venous thrombosis prophylaxis have been ordered including bilateral sequential compression devices    Procedure Details:  After informed consent, patient was taken to the operating room.  General anesthesia was established, patient positioned supine, secured to the table, all pressure points were padded, abdomen prepped and draped.  Timeout was performed.  Welsh catheter was placed sterilely.    2 fingerbreadth above the symphysis pubis, a 3 cm vertical incision was made and deepened through the old scar through the different layers of the abdominal wall until the fascia was encountered and opened using a combination of cautery and sharp scissor dissection.  The bladder was then filled using the Welsh catheter, identified, and a 3 cm vertical incision was created in the bladder, with the walls retracted by 0 Vicryl stay sutures.  We then placed the Jose wound retractor into the bladder, connected it to the gas bubble which was then connected to the air seal insufflation.  This allowed the bladder to be insufflated and direct access into the bladder to be established.  We examined the inside of the bladder and there were no abnormalities seen, except for large bulging prostate with some trabeculations in the bladder.  We  then docked the single port robot on the gas bubble, and inserted the instruments into the bladder.  We established a custom remote center CRC prior to inserting all instruments down to the surgical field.  We scored the mucosa around the adenoma, noting location of the left ureteral orifice as the patient had a solitary left kidney, and entered the enucleation plane, and dissection was very challenging because of the prior greenlight PVP procedure and the prostate dissection was significantly more bloody than usual.  Because of the limitations of the workspace, we had to divide the prostate into multiple lobes and resected those separately, in order to allow for more space for dissection.  We then eventually reached the urethra sharply transected the urethra off the adenomas at the apex.  Perforating vessels were controlled with bipolar device and/or monopolar cautery.  Once the adenomas were freed up, we oversewed the capsule bidirectionally, starting at 6:00 clockwise to 12:00, plicating it using 3-0 v-lock, and did the same in a counterclockwise fashion, and this achieved excellent hemostasis as well as bringing the posterior plate of the bladder down to the urethral plate, adequately achieving retrigonalization.  We then positioned a new catheter into the bladder, undocked the robot and removed the gas bubble and the wound retractor.  We then closed our cystotomy in 2 layers with 2-0 V-ariel for the mucosa-detrusor layer and 2-0 vicryl for a second detrusor-serosa layer, performed a leak test which was negative.  We then initiated continuous bladder irrigation.  Once this was done, we positioned a drain through the abdominal wall and placed it anterior to the bladder, secured to the skin with a 2-0 nylon suture, closed the fascia with running #1 STRATAFIX suture, closed the subcutaneous tissue with 3-0 Vicryl, skin with 4-0 monocryl.  Patient was extubated and taken to PACU.    Evidence of Infection: No    Complications:  None; patient tolerated the procedure well.    Disposition: PACU - hemodynamically stable.  Condition: stable         Task Performed by CYNTHIA First Assist or Physician Assistant:   Sidra Kauffman PA/NP, was necessary to assist on this case due to the nature of the case and difficulty. During the case Sidra served as my assist on the bedside by retracting, exposing, suctioning blood and urine. There were no qualified resident in this case.      Additional Details: The surgery was significantly more demanding than usual and required nearly 100% more time because of the challenging aspect due to size and bleeding tendency of the tissue.    Attending Attestation: I performed the procedure.    Samaria Garcia  Phone Number: [phone]

## 2025-06-01 VITALS
HEIGHT: 71 IN | SYSTOLIC BLOOD PRESSURE: 154 MMHG | WEIGHT: 194 LBS | OXYGEN SATURATION: 93 % | DIASTOLIC BLOOD PRESSURE: 79 MMHG | RESPIRATION RATE: 18 BRPM | BODY MASS INDEX: 27.16 KG/M2 | TEMPERATURE: 97.5 F | HEART RATE: 83 BPM

## 2025-06-01 LAB
BASOPHILS # BLD AUTO: 0.04 X10*3/UL (ref 0–0.1)
BASOPHILS NFR BLD AUTO: 0.3 %
EOSINOPHIL # BLD AUTO: 0.26 X10*3/UL (ref 0–0.4)
EOSINOPHIL NFR BLD AUTO: 2.1 %
ERYTHROCYTE [DISTWIDTH] IN BLOOD BY AUTOMATED COUNT: 17.2 % (ref 11.5–14.5)
HCT VFR BLD AUTO: 26.3 % (ref 41–52)
HGB BLD-MCNC: 7.7 G/DL (ref 13.5–17.5)
IMM GRANULOCYTES # BLD AUTO: 0.12 X10*3/UL (ref 0–0.5)
IMM GRANULOCYTES NFR BLD AUTO: 1 % (ref 0–0.9)
LYMPHOCYTES # BLD AUTO: 1.31 X10*3/UL (ref 0.8–3)
LYMPHOCYTES NFR BLD AUTO: 10.7 %
MCH RBC QN AUTO: 26.9 PG (ref 26–34)
MCHC RBC AUTO-ENTMCNC: 29.3 G/DL (ref 32–36)
MCV RBC AUTO: 92 FL (ref 80–100)
MONOCYTES # BLD AUTO: 1.09 X10*3/UL (ref 0.05–0.8)
MONOCYTES NFR BLD AUTO: 8.9 %
NEUTROPHILS # BLD AUTO: 9.45 X10*3/UL (ref 1.6–5.5)
NEUTROPHILS NFR BLD AUTO: 77 %
NRBC BLD-RTO: 0 /100 WBCS (ref 0–0)
PLATELET # BLD AUTO: 306 X10*3/UL (ref 150–450)
RBC # BLD AUTO: 2.86 X10*6/UL (ref 4.5–5.9)
WBC # BLD AUTO: 12.3 X10*3/UL (ref 4.4–11.3)

## 2025-06-01 PROCEDURE — 36415 COLL VENOUS BLD VENIPUNCTURE: CPT | Performed by: STUDENT IN AN ORGANIZED HEALTH CARE EDUCATION/TRAINING PROGRAM

## 2025-06-01 PROCEDURE — 2500000001 HC RX 250 WO HCPCS SELF ADMINISTERED DRUGS (ALT 637 FOR MEDICARE OP): Performed by: STUDENT IN AN ORGANIZED HEALTH CARE EDUCATION/TRAINING PROGRAM

## 2025-06-01 PROCEDURE — 85025 COMPLETE CBC W/AUTO DIFF WBC: CPT | Performed by: STUDENT IN AN ORGANIZED HEALTH CARE EDUCATION/TRAINING PROGRAM

## 2025-06-01 RX ADMIN — ACETAMINOPHEN 975 MG: 325 TABLET ORAL at 04:25

## 2025-06-01 ASSESSMENT — COGNITIVE AND FUNCTIONAL STATUS - GENERAL
MOBILITY SCORE: 24
DAILY ACTIVITIY SCORE: 24

## 2025-06-01 ASSESSMENT — PAIN - FUNCTIONAL ASSESSMENT: PAIN_FUNCTIONAL_ASSESSMENT: 0-10

## 2025-06-01 ASSESSMENT — PAIN SCALES - GENERAL: PAINLEVEL_OUTOF10: 0 - NO PAIN

## 2025-06-01 NOTE — DISCHARGE SUMMARY
Discharge Diagnosis  BPH with urinary obstruction    Issues Requiring Follow-Up  CBC Wednesday 6/4    Test Results Pending At Discharge  Pending Labs       Order Current Status    Surgical Pathology Exam In process            Hospital Course   Gary Pineda underwent robotic simple prostatectomy with Dr. Garcia on 5/30/2025. They tolerated the procedure well and were admitted to a regular nursing floor. Hospital course was uncomplicated. He did have an element of acute blood loss anemia that required 1 unit of PRBC, his post transfusion CBC demonstrated appropriate incrementation. On day of discharge, patient was tolerating a regular diet, pain was adequately controlled with PO pain medications, patient was ambulatory and diego was draining well. Hemoglobin was stable. Drain output was observed to be low and the drain was removed prior to discharge. Patient will follow-up in a few days for a cystogram and possible diego removal pending that result.     Pertinent Physical Exam At Time of Discharge  Physical Exam  Constitutional:       Appearance: Normal appearance.   HENT:      Head: Normocephalic and atraumatic.      Nose: Nose normal.      Mouth/Throat:      Mouth: Mucous membranes are moist.   Eyes:      Extraocular Movements: Extraocular movements intact.      Conjunctiva/sclera: Conjunctivae normal.      Pupils: Pupils are equal, round, and reactive to light.   Cardiovascular:      Rate and Rhythm: Normal rate and regular rhythm.   Pulmonary:      Effort: Pulmonary effort is normal.   Abdominal:      Comments: Soft, non-distended, appropriately tender, incisions c/d/i   Genitourinary:     Comments: Catheter in place draining clear yellow urine  Musculoskeletal:         General: Normal range of motion.   Skin:     General: Skin is warm and dry.   Neurological:      Mental Status: He is alert and oriented to person, place, and time.         Home Medications     Medication List      START taking these medications      oxyCODONE 5 mg immediate release tablet; Commonly known as: Roxicodone;   Take 1 tablet (5 mg) by mouth every 6 hours if needed for severe pain (7 -   10).   polyethylene glycol 17 gram packet; Commonly known as: Glycolax,   Miralax; Take 17 g by mouth once daily.     CHANGE how you take these medications     chlorhexidine 4 % external liquid; Commonly known as: Hibiclens; Apply 1   Application topically once daily for 5 days. Use per CPM/PAT provided   instructions; What changed: Another medication with the same name was   removed. Continue taking this medication, and follow the directions you   see here.     CONTINUE taking these medications     acetaminophen 325 mg tablet; Commonly known as: Tylenol; Take 2 tablets   (650 mg) by mouth every 4 hours if needed for mild pain (1 - 3).   atorvastatin 80 mg tablet; Commonly known as: Lipitor; TAKE 1 TABLET (80   MG) BY MOUTH ONCE DAILY IN THE EVENING.   cholecalciferol 25 mcg (1,000 units) capsule; Commonly known as: Vitamin   D-3   empagliflozin 25 mg tablet; Commonly known as: Jardiance; Take 1 tablet   (25 mg) by mouth once daily.   ferrous sulfate 325 (65 Fe) mg EC tablet; Take 1 tablet by mouth once   daily with breakfast. Do not crush, chew, or split.   Januvia 100 mg tablet; Generic drug: SITagliptin phosphate; TAKE 1   TABLET BY MOUTH EVERY DAY   losartan 25 mg tablet; Commonly known as: Cozaar; TAKE 1 TABLET BY MOUTH   EVERY DAY   OneTouch Verio test strips; Generic drug: blood sugar diagnostic; FOR   TESTING ONE A DAY DX E 11.9   sulfamethoxazole-trimethoprim 800-160 mg tablet; Commonly known as:   Bactrim DS; Take 1 tablet by mouth 2 times a day for 7 days.       Outpatient Follow-Up  Future Appointments   Date Time Provider Department Center   6/4/2025 10:45 AM RICKIE [facility]   6/13/2025 10:40 AM Samaria Garcia MD [id number] [facility]   8/5/2025  9:00 AM Kale Cazares DO [id number] [facility]   8/27/2025  9:45 AM Calli Bauer MD  [id number] Jose G Mcdaniel MD

## 2025-06-01 NOTE — CARE PLAN
Problem: Pain - Adult  Goal: Verbalizes/displays adequate comfort level or baseline comfort level  Outcome: Met     Problem: Safety - Adult  Goal: Free from fall injury  Outcome: Met     Problem: Discharge Planning  Goal: Discharge to home or other facility with appropriate resources  Outcome: Met     Problem: Chronic Conditions and Co-morbidities  Goal: Patient's chronic conditions and co-morbidity symptoms are monitored and maintained or improved  Outcome: Met     Problem: Nutrition  Goal: Nutrient intake appropriate for maintaining nutritional needs  Outcome: Met

## 2025-06-01 NOTE — CARE PLAN
Problem: Pain - Adult  Goal: Verbalizes/displays adequate comfort level or baseline comfort level  Outcome: Progressing     Problem: Safety - Adult  Goal: Free from fall injury  Outcome: Progressing     Problem: Discharge Planning  Goal: Discharge to home or other facility with appropriate resources  Outcome: Progressing     Problem: Chronic Conditions and Co-morbidities  Goal: Patient's chronic conditions and co-morbidity symptoms are monitored and maintained or improved  Outcome: Progressing     Problem: Nutrition  Goal: Nutrient intake appropriate for maintaining nutritional needs  Outcome: Progressing   The patient's goals for the shift include      The clinical goals for the shift include safety with ambulation

## 2025-06-01 NOTE — NURSING NOTE
Patient being discharged home with no needs on 6/1. Pt. VSS and HSD. Removed peripheral Ivs x 2 and catheters were intact. Patient going home with diego catheter intact. Completed teaching and switched patient to a leg bag. Provided supplies to patient. Reviewed discharge paperwork with patient and made him aware of follow up appointment and prescriptions. Patient called wife and she will be coming to pick him up in private car. Transporter to take patient down to [facility] lobby in wheelchair.

## 2025-06-02 DIAGNOSIS — N40.1 BPH WITH URINARY OBSTRUCTION: ICD-10-CM

## 2025-06-02 DIAGNOSIS — N13.8 BPH WITH URINARY OBSTRUCTION: ICD-10-CM

## 2025-06-04 ENCOUNTER — HOSPITAL ENCOUNTER (OUTPATIENT)
Dept: RADIOLOGY | Facility: HOSPITAL | Age: 80
Discharge: HOME | End: 2025-06-04
Payer: MEDICARE

## 2025-06-04 DIAGNOSIS — N40.1 BPH WITH URINARY OBSTRUCTION: ICD-10-CM

## 2025-06-04 DIAGNOSIS — N13.8 BPH WITH URINARY OBSTRUCTION: ICD-10-CM

## 2025-06-04 DIAGNOSIS — E11.9 CONTROLLED TYPE 2 DIABETES MELLITUS WITHOUT COMPLICATION, WITHOUT LONG-TERM CURRENT USE OF INSULIN: ICD-10-CM

## 2025-06-04 DIAGNOSIS — N40.1 BENIGN PROSTATIC HYPERPLASIA WITH LOWER URINARY TRACT SYMPTOMS, SYMPTOM DETAILS UNSPECIFIED: ICD-10-CM

## 2025-06-04 PROCEDURE — 74430 CONTRAST X-RAY BLADDER: CPT

## 2025-06-04 PROCEDURE — 2550000001 HC RX 255 CONTRASTS: Performed by: STUDENT IN AN ORGANIZED HEALTH CARE EDUCATION/TRAINING PROGRAM

## 2025-06-04 RX ORDER — SITAGLIPTIN 100 MG/1
100 TABLET, FILM COATED ORAL DAILY
Qty: 90 TABLET | Refills: 1 | Status: SHIPPED | OUTPATIENT
Start: 2025-06-04

## 2025-06-04 RX ADMIN — DIATRIZOATE MEGLUMINE 150 ML: 300 INJECTION, SOLUTION INTRAVENOUS at 11:31

## 2025-06-06 ENCOUNTER — LAB REQUISITION (OUTPATIENT)
Dept: LAB | Facility: HOSPITAL | Age: 80
End: 2025-06-06
Payer: MEDICARE

## 2025-06-06 DIAGNOSIS — D64.9 ANEMIA, UNSPECIFIED: ICD-10-CM

## 2025-06-06 DIAGNOSIS — R31.9 HEMATURIA, UNSPECIFIED TYPE: ICD-10-CM

## 2025-06-06 DIAGNOSIS — D64.9 POSTOPERATIVE ANEMIA: ICD-10-CM

## 2025-06-06 LAB
ALBUMIN SERPL BCP-MCNC: 3.1 G/DL (ref 3.4–5)
ALP SERPL-CCNC: 135 U/L (ref 33–136)
ALT SERPL W P-5'-P-CCNC: 26 U/L (ref 10–52)
ANION GAP SERPL CALCULATED.3IONS-SCNC: 13 MMOL/L (ref 10–20)
AST SERPL W P-5'-P-CCNC: 27 U/L (ref 9–39)
BILIRUB SERPL-MCNC: 0.9 MG/DL (ref 0–1.2)
BUN SERPL-MCNC: 17 MG/DL (ref 6–23)
CALCIUM SERPL-MCNC: 8.4 MG/DL (ref 8.6–10.3)
CHLORIDE SERPL-SCNC: 103 MMOL/L (ref 98–107)
CO2 SERPL-SCNC: 23 MMOL/L (ref 21–32)
CREAT SERPL-MCNC: 1.13 MG/DL (ref 0.5–1.3)
EGFRCR SERPLBLD CKD-EPI 2021: 65 ML/MIN/1.73M*2
ERYTHROCYTE [DISTWIDTH] IN BLOOD BY AUTOMATED COUNT: 15.5 % (ref 11.5–14.5)
GLUCOSE SERPL-MCNC: 132 MG/DL (ref 74–99)
HCT VFR BLD AUTO: 28.4 % (ref 41–52)
HGB BLD-MCNC: 8.7 G/DL (ref 13.5–17.5)
MCH RBC QN AUTO: 27.4 PG (ref 26–34)
MCHC RBC AUTO-ENTMCNC: 30.6 G/DL (ref 32–36)
MCV RBC AUTO: 90 FL (ref 80–100)
NRBC BLD-RTO: 0 /100 WBCS (ref 0–0)
PLATELET # BLD AUTO: 336 X10*3/UL (ref 150–450)
POTASSIUM SERPL-SCNC: 4.5 MMOL/L (ref 3.5–5.3)
PROT SERPL-MCNC: 6 G/DL (ref 6.4–8.2)
RBC # BLD AUTO: 3.17 X10*6/UL (ref 4.5–5.9)
SODIUM SERPL-SCNC: 134 MMOL/L (ref 136–145)
WBC # BLD AUTO: 13 X10*3/UL (ref 4.4–11.3)

## 2025-06-06 PROCEDURE — 85027 COMPLETE CBC AUTOMATED: CPT

## 2025-06-06 PROCEDURE — 80053 COMPREHEN METABOLIC PANEL: CPT

## 2025-06-12 LAB
LABORATORY COMMENT REPORT: NORMAL
PATH REPORT.FINAL DX SPEC: NORMAL
PATH REPORT.GROSS SPEC: NORMAL
PATH REPORT.RELEVANT HX SPEC: NORMAL
PATH REPORT.TOTAL CANCER: NORMAL

## 2025-06-12 NOTE — PROGRESS NOTES
"Subjective   Patient ID: Gary Pineda \"Donte" is a 80 y.o. male.      HPI  80 y.o. male presents for a postoperative follow up visit. He is status post RASP on 05/30/2025. 120.53 grams of prostatic tissue was removed from the prostate.     Surgery resulted in blood loss requiring I unit pRBC transfusion. He is slowly recovering his strength and energy. CBC improving since immediate post-op period.    He has a history of BPH with LUTS and hematuria. He is status post Greenlight PVP with Dr. Gomes in 2023, developed intermittent hematuria after.     The patient experiences nocturia every hour to 2. He continues to be incontinent.     He indicates that he has had the incision covered, as 3 days ago it began to have drainage. He denies pain, hematuria, swelling around the area of the incision.       Surgical Pathology Exam collected on 05/30/2025:   FINAL DIAGNOSIS   A. PROSTATE, SIMPLE PROSTATECTOMY:   Prostatic tissue with glandular and stromal hyperplasia and foci of acute inflammation with associated necrosis   120.53 grams of prostatic tissue was removed from the prostate.       CT A&P (05/03/25)   Noted significant prostatomegaly; 274g prostate.   Noted moderate clot burden within his bladder.   Solitary left kidney    CTU (01/25/24)   Solitary left kidney  No stones or hydronephrosis bilaterally.   Numerous bladder diverticula   No filling defects aside from enlarged prostate with intravesical protrusion.   +Prostatomegaly.     Review of Systems  A complete review of systems was performed. All systems are noted to be negative unless indicated in the history of present illness, impression, active problem list, or past histories.     Objective   Physical Exam  PVR: 0cc  Superficial dehiscence of the skin at the incision site, no edema or bump surrounding incision.   Apply bacitracin and dressed the incision.     Assessment/Plan   Diagnoses and all orders for this visit:  Anemia, unspecified type  -     CBC; " Future  -     Basic Metabolic Panel; Future      80 y.o. male presents for a postoperative follow up visit. He is status post RASP on 05/30/2025. 120.53 grams of prostatic tissue was removed from the prostate.     The patient has a superficial dehiscence of the skin at the incision site. The sutures have not healed or are dissolved and left the would open. It is unknown if the liquid draining is discharge or urine.     I personally reviewed the surgical pathology report from the RASP on 05/30/2025 that indicated no evidence of malignancy. 120 grams of prostatic tissue was removed from the patient's prostate.    I would like to obtain a PVR today in office to establish a baseline postoperatively for the patient. The PVR will indicate if he is completely emptying his bladder. The PVR result was 0 cc. This result indicates that he is completely emptying his bladder.     I have personally reviewed the patient's CBC including the hemoglobin that remains low at 8.7. I have personally reviewed the patient's creatinine is 1.13 and eGFR was 65, which are both normal. I will order the patient a CBC and BMP to be completed next week.     I indicted that the patient should not be using an exercise machine at this time in his recovery.     I have advised the patient that if he experiences pain or swelling of the incision, he needs to proceed to the ED and obtain a CT scan. I want to ensure that the bladder is not leaking urine.     Due to the prior procedure, the recent surgery required 6 hours of time. As the patient's procedure went longer, he had a greater amount of time and chance to bleed. As a result, he require a transfusion prior to leaving the hospital. His hemoglobin was low preoperatively.      Plan:  PVR today   CBC BMP next week   FUV in 6 weeks       Scribe Attestation   By signing my name below, I, Boo Colby, Babitaibe attestation that this documentation has been prepared under the direction and in the  presence of Samaria Garcia MD.

## 2025-06-13 ENCOUNTER — APPOINTMENT (OUTPATIENT)
Dept: UROLOGY | Facility: CLINIC | Age: 80
End: 2025-06-13
Payer: MEDICARE

## 2025-06-13 DIAGNOSIS — D64.9 ANEMIA, UNSPECIFIED TYPE: ICD-10-CM

## 2025-06-13 PROCEDURE — 1126F AMNT PAIN NOTED NONE PRSNT: CPT | Performed by: STUDENT IN AN ORGANIZED HEALTH CARE EDUCATION/TRAINING PROGRAM

## 2025-06-13 PROCEDURE — 99024 POSTOP FOLLOW-UP VISIT: CPT | Performed by: STUDENT IN AN ORGANIZED HEALTH CARE EDUCATION/TRAINING PROGRAM

## 2025-06-13 PROCEDURE — 1111F DSCHRG MED/CURRENT MED MERGE: CPT | Performed by: STUDENT IN AN ORGANIZED HEALTH CARE EDUCATION/TRAINING PROGRAM

## 2025-06-13 PROCEDURE — 1036F TOBACCO NON-USER: CPT | Performed by: STUDENT IN AN ORGANIZED HEALTH CARE EDUCATION/TRAINING PROGRAM

## 2025-06-13 PROCEDURE — 1159F MED LIST DOCD IN RCRD: CPT | Performed by: STUDENT IN AN ORGANIZED HEALTH CARE EDUCATION/TRAINING PROGRAM

## 2025-06-13 ASSESSMENT — PAIN SCALES - GENERAL: PAINLEVEL_OUTOF10: 0-NO PAIN

## 2025-06-15 LAB
ANION GAP SERPL CALCULATED.4IONS-SCNC: 10 MMOL/L (CALC) (ref 7–17)
BUN SERPL-MCNC: 14 MG/DL (ref 7–25)
BUN/CREAT SERPL: ABNORMAL (CALC) (ref 6–22)
CALCIUM SERPL-MCNC: 9 MG/DL (ref 8.6–10.3)
CHLORIDE SERPL-SCNC: 100 MMOL/L (ref 98–110)
CO2 SERPL-SCNC: 24 MMOL/L (ref 20–32)
CREAT SERPL-MCNC: 0.99 MG/DL (ref 0.7–1.22)
EGFRCR SERPLBLD CKD-EPI 2021: 77 ML/MIN/1.73M2
ERYTHROCYTE [DISTWIDTH] IN BLOOD BY AUTOMATED COUNT: 14.9 % (ref 11–15)
GLUCOSE SERPL-MCNC: 190 MG/DL (ref 65–139)
HCT VFR BLD AUTO: 33.7 % (ref 38.5–50)
HGB BLD-MCNC: 9.7 G/DL (ref 13.2–17.1)
MCH RBC QN AUTO: 26.2 PG (ref 27–33)
MCHC RBC AUTO-ENTMCNC: 28.8 G/DL (ref 32–36)
MCV RBC AUTO: 91.1 FL (ref 80–100)
PLATELET # BLD AUTO: 763 THOUSAND/UL (ref 140–400)
PMV BLD REES-ECKER: 8.8 FL (ref 7.5–12.5)
POTASSIUM SERPL-SCNC: 4.9 MMOL/L (ref 3.5–5.3)
RBC # BLD AUTO: 3.7 MILLION/UL (ref 4.2–5.8)
SODIUM SERPL-SCNC: 134 MMOL/L (ref 135–146)
WBC # BLD AUTO: 11.3 THOUSAND/UL (ref 3.8–10.8)

## 2025-06-16 DIAGNOSIS — D64.9 ANEMIA, UNSPECIFIED TYPE: ICD-10-CM

## 2025-06-19 DIAGNOSIS — I10 ESSENTIAL (PRIMARY) HYPERTENSION: ICD-10-CM

## 2025-06-19 RX ORDER — ATORVASTATIN CALCIUM 80 MG/1
80 TABLET, FILM COATED ORAL EVERY EVENING
Qty: 90 TABLET | Refills: 1 | Status: SHIPPED | OUTPATIENT
Start: 2025-06-19 | End: 2025-12-16

## 2025-06-24 DIAGNOSIS — I10 ESSENTIAL (PRIMARY) HYPERTENSION: ICD-10-CM

## 2025-06-24 RX ORDER — LOSARTAN POTASSIUM 25 MG/1
25 TABLET ORAL DAILY
Qty: 90 TABLET | Refills: 1 | Status: SHIPPED | OUTPATIENT
Start: 2025-06-24

## 2025-07-10 DIAGNOSIS — E11.9 CONTROLLED TYPE 2 DIABETES MELLITUS WITHOUT COMPLICATION, WITHOUT LONG-TERM CURRENT USE OF INSULIN: ICD-10-CM

## 2025-07-10 RX ORDER — EMPAGLIFLOZIN 25 MG/1
25 TABLET, FILM COATED ORAL DAILY
Qty: 90 TABLET | Refills: 3 | Status: SHIPPED | OUTPATIENT
Start: 2025-07-10

## 2025-07-17 NOTE — PROGRESS NOTES
"Subjective   Patient ID: Gary Pineda \"Donte" is a 80 y.o. male.      HPI  80 y.o. male presents for a follow up visit. He is status post RASP on 05/30/2025. 120.53 grams of prostatic tissue was removed from the prostate. He is experiencing a different appearance of the surgical site, than expected. He denies pain, nausea or vomiting.     He indicated that he is passing \"chunks of something\". He indicates that he is experiencing frequency but his urinary stream has vastly improved. He is satisfied with the outcome.     Surgery resulted in blood loss requiring I unit pRBC transfusion. He is slowly recovering his strength and energy. CBC improving since immediate post-op period.    He has a history of BPH with LUTS and hematuria. He is status post Greenlight PVP with Dr. Gomes in 2023, developed intermittent hematuria after.     Component      Latest Ref Rng 6/14/2025   GLUCOSE      65 - 139 mg/dL 190 (H)    UREA NITROGEN (BUN)      7 - 25 mg/dL 14    CREATININE      0.70 - 1.22 mg/dL 0.99    EGFR      > OR = 60 mL/min/1.73m2 77    SODIUM      135 - 146 mmol/L 134 (L)    POTASSIUM      3.5 - 5.3 mmol/L 4.9    CHLORIDE      98 - 110 mmol/L 100    CARBON DIOXIDE      20 - 32 mmol/L 24    ELECTROLYTE BALANCE      7 - 17 mmol/L (calc) 10    CALCIUM      8.6 - 10.3 mg/dL 9.0    BUN/CREATININE RATIO      6 - 22 (calc) SEE NOTE:       Legend:  (H) High  (L) Low    Lab Results   Component Value Date    WBC 11.3 (H) 06/14/2025    HGB 9.7 (L) 06/14/2025    HCT 33.7 (L) 06/14/2025    MCV 91.1 06/14/2025     (H) 06/14/2025         Surgical Pathology Exam collected on 05/30/2025:   FINAL DIAGNOSIS  A. PROSTATE, SIMPLE PROSTATECTOMY:   Prostatic tissue with glandular and stromal hyperplasia and foci of acute inflammation with associated necrosis  120.53 grams of prostatic tissue was removed from the prostate.         CT A&P (05/03/25)   Noted significant prostatomegaly; 274g prostate.   Noted moderate clot burden within his " bladder.   Solitary left kidney    CTU (01/25/24)   Solitary left kidney  No stones or hydronephrosis bilaterally.   Numerous bladder diverticula   No filling defects aside from enlarged prostate with intravesical protrusion.   +Prostatomegaly.     Review of Systems  A complete review of systems was performed. All systems are noted to be negative unless indicated in the history of present illness, impression, active problem list, or past histories.     Objective   Physical Exam  At the bottom of the incision, there is granulation issue with unhealed spot that protrudes further on compression. No observed fluid leakage. The incision is non-tender with no edema.     Assessment/Plan   Diagnoses and all orders for this visit:  Benign prostatic hyperplasia with urinary frequency  -     Case Request Operating Room: CYSTOSCOPY, FLEXIBLE, DEBRIDEMENT, WOUND; Standing  Wound dehiscence  -     Case Request Operating Room: CYSTOSCOPY, FLEXIBLE, DEBRIDEMENT, WOUND; Standing  Other orders  -     NPO Diet Except: Sips with meds; Effective now; Standing  -     Height and weight; Standing  -     Insert and maintain peripheral IV; Standing  -     Saline lock IV; Standing  -     POCT Glucose; Standing  -     Place in outpatient/hospital ambulatory surgery; Standing  -     Full code; Standing  -     ceFAZolin (Ancef) 2 g in dextrose (iso)  mL      80 y.o. male presents for a follow up visit. He is status post RASP on 05/30/2025. 120.53 grams of prostatic tissue was removed from the prostate.     I have reviewed that there is 2 different etiologies for the appearance of the incision. One etiology could be that the skin is not healing, which would require a small surgery to revise the area underneath the incision. The adipose tissue would need to be cleaned underneath the incision.     The second etiology of the appearance of the patient's incision is related to the skin of the bladder. As a result of the incision being too close  to the area of the bladder causing urine to be leaking from the incision. I do not believe that this is the etiology as this would be highly unusual and he is not experiencing any drainage.     I discussed that the procedure that I would recommend would be for him to have a revision of the surgical incision with a repeat retrograde cystogram. The patient will undergo a repeat cystogram and revision of a wound on 08/01/2025.       Plan:  Take to OR for a cystoscopy and retrograde cystogram and revision of wound on 08/01/2025       Scribe Attestation   By signing my name below, I, Jessica Haq attestation that this documentation has been prepared under the direction and in the presence of Samaria Garcia MD.

## 2025-07-21 ENCOUNTER — APPOINTMENT (OUTPATIENT)
Dept: UROLOGY | Facility: CLINIC | Age: 80
End: 2025-07-21
Payer: MEDICARE

## 2025-07-21 DIAGNOSIS — T81.30XA WOUND DEHISCENCE: ICD-10-CM

## 2025-07-21 DIAGNOSIS — N40.1 BENIGN PROSTATIC HYPERPLASIA WITH URINARY FREQUENCY: Primary | ICD-10-CM

## 2025-07-21 DIAGNOSIS — R35.0 BENIGN PROSTATIC HYPERPLASIA WITH URINARY FREQUENCY: Primary | ICD-10-CM

## 2025-07-21 PROCEDURE — 1126F AMNT PAIN NOTED NONE PRSNT: CPT | Performed by: STUDENT IN AN ORGANIZED HEALTH CARE EDUCATION/TRAINING PROGRAM

## 2025-07-21 PROCEDURE — 1159F MED LIST DOCD IN RCRD: CPT | Performed by: STUDENT IN AN ORGANIZED HEALTH CARE EDUCATION/TRAINING PROGRAM

## 2025-07-21 PROCEDURE — 1036F TOBACCO NON-USER: CPT | Performed by: STUDENT IN AN ORGANIZED HEALTH CARE EDUCATION/TRAINING PROGRAM

## 2025-07-21 PROCEDURE — 99024 POSTOP FOLLOW-UP VISIT: CPT | Performed by: STUDENT IN AN ORGANIZED HEALTH CARE EDUCATION/TRAINING PROGRAM

## 2025-07-21 RX ORDER — CEFAZOLIN SODIUM 2 G/100ML
2 INJECTION, SOLUTION INTRAVENOUS ONCE
OUTPATIENT
Start: 2025-07-21 | End: 2025-07-21

## 2025-07-21 ASSESSMENT — PAIN SCALES - GENERAL: PAINLEVEL_OUTOF10: 0-NO PAIN

## 2025-07-28 ENCOUNTER — PRE-ADMISSION TESTING (OUTPATIENT)
Dept: PREADMISSION TESTING | Facility: HOSPITAL | Age: 80
End: 2025-07-28
Payer: MEDICARE

## 2025-07-28 ENCOUNTER — ANESTHESIA EVENT (OUTPATIENT)
Dept: OPERATING ROOM | Facility: HOSPITAL | Age: 80
End: 2025-07-28
Payer: MEDICARE

## 2025-07-28 DIAGNOSIS — R19.04 LEFT LOWER QUADRANT ABDOMINAL MASS: Primary | ICD-10-CM

## 2025-07-28 RX ORDER — CHLORHEXIDINE GLUCONATE 40 MG/ML
SOLUTION TOPICAL DAILY
Qty: 354 ML | Refills: 0 | Status: SHIPPED | OUTPATIENT
Start: 2025-07-28 | End: 2025-08-01 | Stop reason: HOSPADM

## 2025-07-28 RX ORDER — CHLORHEXIDINE GLUCONATE ORAL RINSE 1.2 MG/ML
15 SOLUTION DENTAL DAILY
Qty: 30 ML | Refills: 0 | Status: SHIPPED | OUTPATIENT
Start: 2025-07-28 | End: 2025-08-01 | Stop reason: HOSPADM

## 2025-07-28 ASSESSMENT — DUKE ACTIVITY SCORE INDEX (DASI)
CAN YOU HAVE SEXUAL RELATIONS: NO
CAN YOU PARTICIPATE IN STRENOUS SPORTS LIKE SWIMMING, SINGLES TENNIS, FOOTBALL, BASKETBALL, OR SKIING: NO
CAN YOU PARTICIPATE IN MODERATE RECREATIONAL ACTIVITIES LIKE GOLF, BOWLING, DANCING, DOUBLES TENNIS OR THROWING A BASEBALL OR FOOTBALL: YES
CAN YOU WALK INDOORS, SUCH AS AROUND YOUR HOUSE: YES
CAN YOU DO MODERATE WORK AROUND THE HOUSE LIKE VACUUMING, SWEEPING FLOORS OR CARRYING GROCERIES: YES
CAN YOU RUN A SHORT DISTANCE: YES
CAN YOU WALK A BLOCK OR TWO ON LEVEL GROUND: YES
CAN YOU DO YARD WORK LIKE RAKING LEAVES, WEEDING OR PUSHING A MOWER: YES
CAN YOU DO HEAVY WORK AROUND THE HOUSE LIKE SCRUBBING FLOORS OR LIFTING AND MOVING HEAVY FURNITURE: YES
CAN YOU DO LIGHT WORK AROUND THE HOUSE LIKE DUSTING OR WASHING DISHES: YES
CAN YOU TAKE CARE OF YOURSELF (EAT, DRESS, BATHE, OR USE TOILET): YES

## 2025-07-28 NOTE — ANESTHESIA PREPROCEDURE EVALUATION
"Patient: Gary Pineda \"Juan\"    Procedure Information       Date/Time: 08/01/25 0745    Procedures:       CYSTOSCOPY, FLEXIBLE      DEBRIDEMENT, WOUND    Location: [facility] / [facility]    Surgeons: Samaria Garcia MD          Vitals:    08/01/25 0641   BP: 163/85   Pulse: 71   Resp: 17   Temp: 36.3 °C (97.3 °F)   SpO2: 100%       Surgical History[1]  Medical History[2]  Current Medications[3]  Prior to Admission medications   Medication Sig Start Date End Date Taking? Authorizing Provider   atorvastatin (Lipitor) 80 mg tablet TAKE 1 TABLET (80 MG) BY MOUTH ONCE DAILY IN THE EVENING. 6/19/25 12/16/25 Yes Kale Cazares DO   blood sugar diagnostic (OneTouch Verio test strips) strip FOR TESTING ONE A DAY DX E 11.9 1/30/25  Yes Kale Cazares DO   cholecalciferol (Vitamin D-3) 25 MCG (1000 UT) capsule Take 1 capsule (25 mcg) by mouth once daily. 1/21/20  Yes Historical Provider, MD   ferrous sulfate 325 (65 Fe) mg EC tablet Take 1 tablet by mouth once daily with breakfast. Do not crush, chew, or split. 5/20/25 5/20/26 Yes Kale Cazares DO   Januvia 100 mg tablet TAKE 1 TABLET BY MOUTH EVERY DAY 6/4/25  Yes Kale Cazares DO   Jardiance 25 mg tablet TAKE 1 TABLET BY MOUTH EVERY DAY 7/10/25  Yes Kale Cazares DO   levoFLOXacin (Levaquin) 500 mg tablet Take 1 tablet (500 mg) by mouth once daily for 10 days. 7/31/25 8/10/25 Yes Keshawn Pizarro PA-C   losartan (Cozaar) 25 mg tablet TAKE 1 TABLET BY MOUTH EVERY DAY 6/24/25  Yes Kale Cazares DO   chlorhexidine (Hibiclens) 4 % external liquid Apply topically once daily for 5 days. 7/28/25 8/2/25  Heena Lopez PA-C   chlorhexidine (Peridex) 0.12 % solution Use 15 mL in the mouth or throat once daily for 2 days. Once the night before surgery and once the morning of 7/28/25 7/30/25  Heena Lopez PA-C   acetaminophen (Tylenol) 325 mg tablet Take 2 tablets (650 mg) by mouth every 4 hours if needed for mild pain (1 - 3). 5/7/25 7/28/25  Lobo" Mohit, APRN-CNP   oxyCODONE (Roxicodone) 5 mg immediate release tablet Take 1 tablet (5 mg) by mouth every 6 hours if needed for severe pain (7 - 10). 5/31/25 7/28/25  Edie Hernandez MD   polyethylene glycol (Glycolax, Miralax) 17 gram packet Take 17 g by mouth once daily. 5/31/25 7/28/25  Edie Hernandez MD     RX Allergies[4]  Social History     Tobacco Use    Smoking status: Never    Smokeless tobacco: Former     Types: Chew   Substance Use Topics    Alcohol use: Never         Chemistry    Lab Results   Component Value Date/Time     (L) 06/14/2025 1143    K 4.9 06/14/2025 1143     06/14/2025 1143    CO2 24 06/14/2025 1143    BUN 14 06/14/2025 1143    CREATININE 0.99 06/14/2025 1143    Lab Results   Component Value Date/Time    CALCIUM 9.0 06/14/2025 1143    ALKPHOS 135 06/06/2025 1259    ALKPHOS 89 02/04/2025 1012    AST 27 06/06/2025 1259    AST 15 02/04/2025 1012    ALT 26 06/06/2025 1259    ALT 18 02/04/2025 1012    BILITOT 0.9 06/06/2025 1259    BILITOT 1.5 (H) 02/04/2025 1012          Lab Results   Component Value Date/Time    WBC 11.3 (H) 06/14/2025 1143    HGB 9.7 (L) 06/14/2025 1143    HCT 33.7 (L) 06/14/2025 1143     (H) 06/14/2025 1143     Lab Results   Component Value Date/Time    PROTIME 11.2 05/06/2025 0637    INR 1.0 05/06/2025 0637     Encounter Date: 05/05/25   ECG 12 lead   Result Value    Ventricular Rate 67    Atrial Rate 67    ME Interval 218    QRS Duration 80    QT Interval 390    QTC Calculation(Bazett) 412    P Axis 74    R Axis 46    T Axis 42    QRS Count 11    Q Onset 223    P Onset 114    P Offset 163    T Offset 418    QTC Fredericia 404    Narrative    Sinus rhythm with 1st degree AV block  Otherwise normal ECG  Confirmed by Cade Boucher ([id number]) on 5/6/2025 10:22:03 AM     No results found for this or any previous visit from the past 1095 days.    Relevant Problems   Anesthesia (within normal limits)      Cardiac   (+) Mixed hyperlipidemia   (+) Primary  "hypertension      Pulmonary (within normal limits)      Neuro (within normal limits)      GI (within normal limits)      /Renal   (+) BPH with urinary obstruction   (+) Benign prostatic hyperplasia with lower urinary tract symptoms      Liver (within normal limits)      Endocrine   (+) Controlled type 2 diabetes mellitus without complication, without long-term current use of insulin   (+) Type 2 diabetes mellitus with hyperglycemia, without long-term current use of insulin      Hematology   (+) Acute blood loss anemia      Musculoskeletal   (+) Primary osteoarthritis of left knee      HEENT (within normal limits)      ID   (+) Tinea cruris      Skin   (+) Eczema   (+) 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  No clearances ordered.  Recent robotic simple prostatectomy on 25 without anesthesia issues or complications.    25 Nuclear stress test-  IMPRESSION:  1.  Negative myocardial perfusion study without evidence of inducible  myocardial ischemia or prior infarction.  2. The left ventricle is normal in size.  3. Normal LV wall motion with a post-stress LV EF estimated at 64%.    CT calcium scoring -  IMPRESSION:    1.  Total coronary artery calcium score of 1106.1 falls within the   \"Significant plaque burden\" category.    2.  Bilateral lower lobe bronchial wall thickening, multifocal mucus   plugging and adjacent patchy airspace opacities which may reflect   sequelae of an infectious or inflammatory small airways disease   process.  Correlation and follow-up are suggested.    NPO Detail:  No data recorded     Physical Exam    Airway  Mallampati: II  TM distance: >3 FB  Mouth openin finger widths     Cardiovascular - normal exam   Dental    Pulmonary - normal exam   Abdominal - normal " exam           Anesthesia Plan    History of general anesthesia?: yes  History of complications of general anesthesia?: no    ASA 3     general     The patient is not a current smoker.  Patient was previously instructed to abstain from smoking on day of procedure.  Patient did not smoke on day of procedure.    intravenous induction   Anesthetic plan and risks discussed with patient.  Use of blood products discussed with patient who consented to blood products.    Plan discussed with CRNA.           [1]   Past Surgical History:  Procedure Laterality Date    CATARACT EXTRACTION Bilateral 02/18/2015    Cataract Surgery    COLONOSCOPY  09/09/2013    Complete Colonoscopy    HERNIA REPAIR  09/09/2013    Inguinal Hernia Repair + MESH    KNEE ARTHROSCOPY W/ DEBRIDEMENT Right 09/09/2013    Knee Arthroscopy (Therapeutic)    ROTATOR CUFF REPAIR Left 09/09/2013    Rotator Cuff Repair    TOTAL KNEE ARTHROPLASTY Right    [2]   Past Medical History:  Diagnosis Date    Acute maxillary sinusitis, unspecified 06/27/2017    Acute non-recurrent maxillary sinusitis    Body mass index (BMI) 28.0-28.9, adult 03/12/2019    BMI 28.0-28.9,adult    Body mass index (BMI) 28.0-28.9, adult 10/09/2020    Body mass index (BMI) of 28.0 to 28.9 in adult    Body mass index (BMI) 29.0-29.9, adult 03/21/2020    Body mass index (BMI) of 29.0 to 29.9 in adult    Neoplasm of unspecified behavior of bone, soft tissue, and skin 06/28/2017    Neoplasm of skin of ear    Nontoxic goiter, 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    Other conditions influencing health status 06/08/2015    History of cough    Other conditions influencing health status 03/12/2019    Diabetes mellitus type II, uncontrolled    Pain, unspecified 08/09/2016    Acute pain    Personal history of diseases of the skin and subcutaneous tissue 12/04/2014    History of seborrheic keratosis    Personal history of other diseases of male  genital organs     History of benign prostatic hypertrophy    Personal history of other diseases of the musculoskeletal system and connective tissue     History of arthritis    Prediabetes 12/09/2015    Pre-diabetes    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

## 2025-07-28 NOTE — PREPROCEDURE INSTRUCTIONS
Medication List            Accurate as of July 28, 2025  9:53 AM. Always use your most recent med list.                atorvastatin 80 mg tablet  Commonly known as: Lipitor  TAKE 1 TABLET (80 MG) BY MOUTH ONCE DAILY IN THE EVENING.  Medication Adjustments for Surgery: Do Not take on the morning of surgery     * chlorhexidine 4 % external liquid  Commonly known as: Hibiclens  Apply topically once daily for 5 days.  Medication Adjustments for Surgery: Take/Use as prescribed     * chlorhexidine 0.12 % solution  Commonly known as: Peridex  Use 15 mL in the mouth or throat once daily for 2 days. Once the night before surgery and once the morning of  Medication Adjustments for Surgery: Take/Use as prescribed     cholecalciferol 25 mcg (1,000 units) capsule  Commonly known as: Vitamin D-3  Additional Medication Adjustments for Surgery: Take last dose 7 days before surgery     ferrous sulfate 325 (65 Fe) mg EC tablet  Take 1 tablet by mouth once daily with breakfast. Do not crush, chew, or split.  Medication Adjustments for Surgery: Take last dose 3 days before surgery     Januvia 100 mg tablet  Generic drug: SITagliptin phosphate  TAKE 1 TABLET BY MOUTH EVERY DAY  Additional Medication Adjustments for Surgery: Other (Comment)  Notes to patient: LAST DOSE WEDNESDAY, JULY 30TH     Jardiance 25 mg tablet  Generic drug: empagliflozin  TAKE 1 TABLET BY MOUTH EVERY DAY  Additional Medication Adjustments for Surgery: Other (Comment)  Notes to patient: LAST DOSE MONDAY, JULY 28TH     losartan 25 mg tablet  Commonly known as: Cozaar  TAKE 1 TABLET BY MOUTH EVERY DAY  Medication Adjustments for Surgery: Do Not take on the morning of surgery     OneTouch Verio test strips  Generic drug: blood sugar diagnostic  FOR TESTING ONE A DAY DX E 11.9  Medication Adjustments for Surgery: Take/Use as prescribed           * This list has 2 medication(s) that are the same as other medications prescribed for you. Read the directions carefully,  and ask your doctor or other care provider to review them with you.                         ** Use HIBICLENS soap MONDAY, JULY 28TH - FRIDAY, AUGUST 1ST **  ** Use MOUTHWASH the night before and the morning of the procedure **       NPO Instructions:    **AFTER MIDNIGHT CLEAR LIQUIDS ONLY  -ONLY WATER, BLACK COFFEE, TEA, CLEAR SODA**    PLEASE refrain from gum, candy, mints, and smoking in the morning.    STOP DRINKING 3 HOURS PRIOR TO PROCEDURE.    Additional Instructions:      Review your medication instructions, take indicated medications and STOP those when applicable.  Wear comfortable, loose fitting clothing.  Please remove ALL jewelry and body piercing's.   All valuables should be left at home.  Bring a glass case or container/solution for contacts.  Bring Photo ID and Insurance card.     Park in back of hospital by ER. Come up to Second floor-OUTPATIENT dept to check-in.    You MUST have an adult  with you. NO DRIVING FOR 24 HOURS AFTER SURGERY.     If you get ill at all the week before your procedure- CALL YOUR DOCTOR/SURGEON.  Any illness might lead to your procedure being delayed.    Please call us if you are started on any new medications before your procedure.      Call Outpatient dept at [phone] between 1-3pm the day before your procedure (Friday for Monday procedure), for your arrival time.

## 2025-07-30 DIAGNOSIS — R39.89 OTHER SYMPTOMS AND SIGNS INVOLVING THE GENITOURINARY SYSTEM: Primary | ICD-10-CM

## 2025-07-31 DIAGNOSIS — N39.0 URINARY TRACT INFECTION WITHOUT HEMATURIA, SITE UNSPECIFIED: Primary | ICD-10-CM

## 2025-07-31 RX ORDER — LEVOFLOXACIN 500 MG/1
500 TABLET, FILM COATED ORAL DAILY
Qty: 10 TABLET | Refills: 0 | Status: SHIPPED | OUTPATIENT
Start: 2025-07-31 | End: 2025-08-05 | Stop reason: ALTCHOICE

## 2025-08-01 ENCOUNTER — ANESTHESIA (OUTPATIENT)
Dept: OPERATING ROOM | Facility: HOSPITAL | Age: 80
End: 2025-08-01
Payer: MEDICARE

## 2025-08-01 ENCOUNTER — HOSPITAL ENCOUNTER (OUTPATIENT)
Facility: HOSPITAL | Age: 80
Setting detail: OUTPATIENT SURGERY
Discharge: HOME | End: 2025-08-01
Attending: STUDENT IN AN ORGANIZED HEALTH CARE EDUCATION/TRAINING PROGRAM | Admitting: STUDENT IN AN ORGANIZED HEALTH CARE EDUCATION/TRAINING PROGRAM
Payer: MEDICARE

## 2025-08-01 ENCOUNTER — APPOINTMENT (OUTPATIENT)
Dept: RADIOLOGY | Facility: HOSPITAL | Age: 80
End: 2025-08-01
Payer: MEDICARE

## 2025-08-01 VITALS
HEIGHT: 71 IN | WEIGHT: 194 LBS | DIASTOLIC BLOOD PRESSURE: 78 MMHG | OXYGEN SATURATION: 96 % | TEMPERATURE: 97.5 F | BODY MASS INDEX: 27.16 KG/M2 | SYSTOLIC BLOOD PRESSURE: 122 MMHG | RESPIRATION RATE: 14 BRPM | HEART RATE: 58 BPM

## 2025-08-01 DIAGNOSIS — R35.0 BENIGN PROSTATIC HYPERPLASIA WITH URINARY FREQUENCY: Primary | ICD-10-CM

## 2025-08-01 DIAGNOSIS — T81.30XA WOUND DEHISCENCE: ICD-10-CM

## 2025-08-01 DIAGNOSIS — N40.1 BENIGN PROSTATIC HYPERPLASIA WITH URINARY FREQUENCY: Primary | ICD-10-CM

## 2025-08-01 LAB
APPEARANCE UR: ABNORMAL
BACTERIA #/AREA URNS HPF: ABNORMAL /HPF
BACTERIA UR CULT: ABNORMAL
BILIRUB UR QL STRIP: NEGATIVE
COLOR UR: YELLOW
GLUCOSE UR QL STRIP: ABNORMAL
HGB UR QL STRIP: ABNORMAL
HYALINE CASTS #/AREA URNS LPF: ABNORMAL /LPF
KETONES UR QL STRIP: NEGATIVE
LEUKOCYTE ESTERASE UR QL STRIP: ABNORMAL
NITRITE UR QL STRIP: POSITIVE
PH UR STRIP: 6 [PH] (ref 5–8)
PROT UR QL STRIP: ABNORMAL
RBC #/AREA URNS HPF: ABNORMAL /HPF
SERVICE CMNT-IMP: ABNORMAL
SP GR UR STRIP: 1.03 (ref 1–1.03)
SQUAMOUS #/AREA URNS HPF: ABNORMAL /HPF
WBC #/AREA URNS HPF: ABNORMAL /HPF

## 2025-08-01 PROCEDURE — 2500000004 HC RX 250 GENERAL PHARMACY W/ HCPCS (ALT 636 FOR OP/ED): Performed by: STUDENT IN AN ORGANIZED HEALTH CARE EDUCATION/TRAINING PROGRAM

## 2025-08-01 PROCEDURE — 52000 CYSTOURETHROSCOPY: CPT | Performed by: STUDENT IN AN ORGANIZED HEALTH CARE EDUCATION/TRAINING PROGRAM

## 2025-08-01 PROCEDURE — 7100000010 HC PHASE TWO TIME - EACH INCREMENTAL 1 MINUTE: Performed by: STUDENT IN AN ORGANIZED HEALTH CARE EDUCATION/TRAINING PROGRAM

## 2025-08-01 PROCEDURE — 3700000002 HC GENERAL ANESTHESIA TIME - EACH INCREMENTAL 1 MINUTE: Performed by: STUDENT IN AN ORGANIZED HEALTH CARE EDUCATION/TRAINING PROGRAM

## 2025-08-01 PROCEDURE — 7100000002 HC RECOVERY ROOM TIME - EACH INCREMENTAL 1 MINUTE: Performed by: STUDENT IN AN ORGANIZED HEALTH CARE EDUCATION/TRAINING PROGRAM

## 2025-08-01 PROCEDURE — 11042 DBRDMT SUBQ TIS 1ST 20SQCM/<: CPT | Performed by: STUDENT IN AN ORGANIZED HEALTH CARE EDUCATION/TRAINING PROGRAM

## 2025-08-01 PROCEDURE — 2500000005 HC RX 250 GENERAL PHARMACY W/O HCPCS: Performed by: PHYSICIAN ASSISTANT

## 2025-08-01 PROCEDURE — C1758 CATHETER, URETERAL: HCPCS | Performed by: STUDENT IN AN ORGANIZED HEALTH CARE EDUCATION/TRAINING PROGRAM

## 2025-08-01 PROCEDURE — 74430 CONTRAST X-RAY BLADDER: CPT | Performed by: STUDENT IN AN ORGANIZED HEALTH CARE EDUCATION/TRAINING PROGRAM

## 2025-08-01 PROCEDURE — 3600000003 HC OR TIME - INITIAL BASE CHARGE - PROCEDURE LEVEL THREE: Performed by: STUDENT IN AN ORGANIZED HEALTH CARE EDUCATION/TRAINING PROGRAM

## 2025-08-01 PROCEDURE — 51600 INJECTION FOR BLADDER X-RAY: CPT | Performed by: STUDENT IN AN ORGANIZED HEALTH CARE EDUCATION/TRAINING PROGRAM

## 2025-08-01 PROCEDURE — 2500000004 HC RX 250 GENERAL PHARMACY W/ HCPCS (ALT 636 FOR OP/ED): Performed by: PHYSICIAN ASSISTANT

## 2025-08-01 PROCEDURE — 3700000001 HC GENERAL ANESTHESIA TIME - INITIAL BASE CHARGE: Performed by: STUDENT IN AN ORGANIZED HEALTH CARE EDUCATION/TRAINING PROGRAM

## 2025-08-01 PROCEDURE — 2500000004 HC RX 250 GENERAL PHARMACY W/ HCPCS (ALT 636 FOR OP/ED)

## 2025-08-01 PROCEDURE — 76000 FLUOROSCOPY <1 HR PHYS/QHP: CPT

## 2025-08-01 PROCEDURE — 7100000001 HC RECOVERY ROOM TIME - INITIAL BASE CHARGE: Performed by: STUDENT IN AN ORGANIZED HEALTH CARE EDUCATION/TRAINING PROGRAM

## 2025-08-01 PROCEDURE — 3600000008 HC OR TIME - EACH INCREMENTAL 1 MINUTE - PROCEDURE LEVEL THREE: Performed by: STUDENT IN AN ORGANIZED HEALTH CARE EDUCATION/TRAINING PROGRAM

## 2025-08-01 PROCEDURE — 7100000009 HC PHASE TWO TIME - INITIAL BASE CHARGE: Performed by: STUDENT IN AN ORGANIZED HEALTH CARE EDUCATION/TRAINING PROGRAM

## 2025-08-01 PROCEDURE — 2720000007 HC OR 272 NO HCPCS: Performed by: STUDENT IN AN ORGANIZED HEALTH CARE EDUCATION/TRAINING PROGRAM

## 2025-08-01 RX ORDER — APREPITANT 40 MG/1
40 CAPSULE ORAL ONCE AS NEEDED
Status: DISCONTINUED | OUTPATIENT
Start: 2025-08-01 | End: 2025-08-01 | Stop reason: HOSPADM

## 2025-08-01 RX ORDER — PHENAZOPYRIDINE HYDROCHLORIDE 100 MG/1
200 TABLET, FILM COATED ORAL ONCE AS NEEDED
Status: DISCONTINUED | OUTPATIENT
Start: 2025-08-01 | End: 2025-08-01 | Stop reason: HOSPADM

## 2025-08-01 RX ORDER — ACETAMINOPHEN 325 MG/1
650 TABLET ORAL EVERY 4 HOURS PRN
Status: DISCONTINUED | OUTPATIENT
Start: 2025-08-01 | End: 2025-08-01 | Stop reason: HOSPADM

## 2025-08-01 RX ORDER — SODIUM CHLORIDE, SODIUM LACTATE, POTASSIUM CHLORIDE, CALCIUM CHLORIDE 600; 310; 30; 20 MG/100ML; MG/100ML; MG/100ML; MG/100ML
100 INJECTION, SOLUTION INTRAVENOUS CONTINUOUS
Status: ACTIVE | OUTPATIENT
Start: 2025-08-01 | End: 2025-08-01

## 2025-08-01 RX ORDER — OXYCODONE HYDROCHLORIDE 5 MG/1
5 TABLET ORAL EVERY 4 HOURS PRN
Status: DISCONTINUED | OUTPATIENT
Start: 2025-08-01 | End: 2025-08-01 | Stop reason: HOSPADM

## 2025-08-01 RX ORDER — LIDOCAINE HYDROCHLORIDE 20 MG/ML
INJECTION, SOLUTION INFILTRATION; PERINEURAL AS NEEDED
Status: DISCONTINUED | OUTPATIENT
Start: 2025-08-01 | End: 2025-08-01

## 2025-08-01 RX ORDER — FENTANYL CITRATE 50 UG/ML
INJECTION, SOLUTION INTRAMUSCULAR; INTRAVENOUS AS NEEDED
Status: DISCONTINUED | OUTPATIENT
Start: 2025-08-01 | End: 2025-08-01

## 2025-08-01 RX ORDER — ONDANSETRON HYDROCHLORIDE 2 MG/ML
4 INJECTION, SOLUTION INTRAVENOUS ONCE AS NEEDED
Status: DISCONTINUED | OUTPATIENT
Start: 2025-08-01 | End: 2025-08-01 | Stop reason: HOSPADM

## 2025-08-01 RX ORDER — ONDANSETRON HYDROCHLORIDE 2 MG/ML
INJECTION, SOLUTION INTRAVENOUS AS NEEDED
Status: DISCONTINUED | OUTPATIENT
Start: 2025-08-01 | End: 2025-08-01

## 2025-08-01 RX ORDER — SODIUM CHLORIDE, SODIUM LACTATE, POTASSIUM CHLORIDE, CALCIUM CHLORIDE 600; 310; 30; 20 MG/100ML; MG/100ML; MG/100ML; MG/100ML
100 INJECTION, SOLUTION INTRAVENOUS CONTINUOUS
Status: DISCONTINUED | OUTPATIENT
Start: 2025-08-01 | End: 2025-08-01 | Stop reason: HOSPADM

## 2025-08-01 RX ORDER — FENTANYL CITRATE 50 UG/ML
25 INJECTION, SOLUTION INTRAMUSCULAR; INTRAVENOUS EVERY 5 MIN PRN
Status: DISCONTINUED | OUTPATIENT
Start: 2025-08-01 | End: 2025-08-01 | Stop reason: HOSPADM

## 2025-08-01 RX ORDER — VANCOMYCIN 1.75 G/350ML
15 INJECTION, SOLUTION INTRAVENOUS ONCE
Status: DISCONTINUED | OUTPATIENT
Start: 2025-08-01 | End: 2025-08-01

## 2025-08-01 RX ORDER — VANCOMYCIN HYDROCHLORIDE 1 G/200ML
1000 INJECTION, SOLUTION INTRAVENOUS ONCE
Status: COMPLETED | OUTPATIENT
Start: 2025-08-01 | End: 2025-08-01

## 2025-08-01 RX ORDER — LIDOCAINE HYDROCHLORIDE 10 MG/ML
INJECTION, SOLUTION INFILTRATION; PERINEURAL AS NEEDED
Status: DISCONTINUED | OUTPATIENT
Start: 2025-08-01 | End: 2025-08-01 | Stop reason: HOSPADM

## 2025-08-01 RX ORDER — PHENYLEPHRINE HYDROCHLORIDE 10 MG/ML
INJECTION INTRAVENOUS AS NEEDED
Status: DISCONTINUED | OUTPATIENT
Start: 2025-08-01 | End: 2025-08-01

## 2025-08-01 RX ORDER — PROPOFOL 10 MG/ML
INJECTION, EMULSION INTRAVENOUS AS NEEDED
Status: DISCONTINUED | OUTPATIENT
Start: 2025-08-01 | End: 2025-08-01

## 2025-08-01 RX ADMIN — POVIDONE-IODINE 1 APPLICATION: 5 SOLUTION TOPICAL at 08:05

## 2025-08-01 RX ADMIN — FENTANYL CITRATE 15 MCG: 50 INJECTION, SOLUTION INTRAMUSCULAR; INTRAVENOUS at 08:20

## 2025-08-01 RX ADMIN — LIDOCAINE HYDROCHLORIDE 60 MG: 20 INJECTION, SOLUTION INFILTRATION; PERINEURAL at 08:13

## 2025-08-01 RX ADMIN — FENTANYL CITRATE 10 MCG: 50 INJECTION, SOLUTION INTRAMUSCULAR; INTRAVENOUS at 08:45

## 2025-08-01 RX ADMIN — SODIUM CHLORIDE, SODIUM LACTATE, POTASSIUM CHLORIDE, CALCIUM CHLORIDE: 600; 310; 30; 20 INJECTION, SOLUTION INTRAVENOUS at 08:06

## 2025-08-01 RX ADMIN — PHENYLEPHRINE HYDROCHLORIDE 40 MCG: 10 INJECTION INTRAVENOUS at 08:40

## 2025-08-01 RX ADMIN — DEXAMETHASONE SODIUM PHOSPHATE 4 MG: 4 INJECTION, SOLUTION INTRAMUSCULAR; INTRAVENOUS at 08:18

## 2025-08-01 RX ADMIN — FENTANYL CITRATE 25 MCG: 50 INJECTION, SOLUTION INTRAMUSCULAR; INTRAVENOUS at 08:56

## 2025-08-01 RX ADMIN — PHENYLEPHRINE HYDROCHLORIDE 100 MCG: 10 INJECTION INTRAVENOUS at 08:28

## 2025-08-01 RX ADMIN — FENTANYL CITRATE 25 MCG: 50 INJECTION, SOLUTION INTRAMUSCULAR; INTRAVENOUS at 09:05

## 2025-08-01 RX ADMIN — FENTANYL CITRATE 25 MCG: 50 INJECTION, SOLUTION INTRAMUSCULAR; INTRAVENOUS at 08:39

## 2025-08-01 RX ADMIN — ONDANSETRON 4 MG: 2 INJECTION, SOLUTION INTRAMUSCULAR; INTRAVENOUS at 08:18

## 2025-08-01 RX ADMIN — PROPOFOL 150 MG: 10 INJECTION, EMULSION INTRAVENOUS at 08:13

## 2025-08-01 RX ADMIN — VANCOMYCIN HYDROCHLORIDE 1000 MG: 1 INJECTION, SOLUTION INTRAVENOUS at 08:06

## 2025-08-01 SDOH — HEALTH STABILITY: MENTAL HEALTH: CURRENT SMOKER: 0

## 2025-08-01 ASSESSMENT — PAIN - FUNCTIONAL ASSESSMENT
PAIN_FUNCTIONAL_ASSESSMENT: 0-10

## 2025-08-01 ASSESSMENT — PAIN SCALES - GENERAL
PAINLEVEL_OUTOF10: 0 - NO PAIN
PAINLEVEL_OUTOF10: 0 - NO PAIN
PAIN_LEVEL: 0
PAINLEVEL_OUTOF10: 0 - NO PAIN

## 2025-08-01 NOTE — OP NOTE
"CYSTOSCOPY, FLEXIBLE, DEBRIDEMENT, WOUND Operative Note     Date: 2025  OR Location: [facility]    Name: Gary Pineda \"Juan\", : 1945, Age: 80 y.o., MRN: [id number], Sex: male    Diagnosis  Pre-op Diagnosis      * Benign prostatic hyperplasia with urinary frequency [N40.1, R35.0]     * Wound dehiscence [T81.30XA] Post-op Diagnosis     * Benign prostatic hyperplasia with urinary frequency [N40.1, R35.0]     * Wound dehiscence [T81.30XA]     Procedures  CYSTOSCOPY, FLEXIBLE  43975 - NM CYSTOURETHROSCOPY    DEBRIDEMENT, WOUND  50981 - NM DEBRIDEMENT SUBCUTANEOUS TISSUE 1ST 20 SQ CM/<    CHG CYSTOGRAPHY MINIMUM 3 VIEWS RS&I [[id number]]  Surgeons      * Samaria Garcia - Primary    Resident/Fellow/Other Assistant:  Surgeons and Role:  * No surgeons found with a matching role *    Staff:   Circulator: Beti  Circulator: hSeila Noland Person: Nicholas    Anesthesia Staff: CRNA: MINE Condon    Procedure Summary  Anesthesia: General  ASA: III  Estimated Blood Loss: 1 mL  Intra-op Medications:   Administrations occurring from 0805 to 0950 on 25:   Medication Name Total Dose   lidocaine (Xylocaine) 10 mg/mL (1 %) injection 10 mL   lactated Ringer's infusion Cannot be calculated   vancomycin (Vancocin) 1,000 mg in dextrose 5%  mL 1,000 mg   dexAMETHasone (Decadron) 4 mg/mL IV Syringe 2 mL 4 mg   fentaNYL (Sublimaze) injection 50 mcg/mL 100 mcg   lidocaine (Xylocaine) injection 2 % 60 mg   ondansetron (Zofran) 2 mg/mL injection 4 mg   phenylephrine (John-Synephrine) injection 140 mcg   propofol (Diprivan) IV infusion 150 mg              Anesthesia Record               Intraprocedure I/O Totals          Intake    Propofol Drip 15.00 mL    The total shown is the total volume documented since Anesthesia Start was filed.    lactated Ringer's infusion 500.00 mL    vancomycin (Vancocin) 1,000 mg in dextrose 5%  mL 200.00 mL    Total Intake 715 mL          Specimen: No specimens collected " "             Drains and/or Catheters: * None in log *    Tourniquet Times:         Implants:     Findings: No evidence of vesicocutaneous fistula, no leak of contrast from the bladder on retrograde cystogram.  Necrotic tissue at the level of the fascia creating a fascial defect around 2 cm in diameter    Indications: Gary Pineda \"Donte" is an 80 y.o. male who is having surgery for Benign prostatic hyperplasia with urinary frequency [N40.1, R35.0]  Wound dehiscence [T81.30XA].  Recent history of single port robotic simple prostatectomy through a vesicostomy for obstructive and actively bleeding prostate.  The postop course included a transfusion and a slow recovery overall.    The patient was seen in the preoperative area. The risks, benefits, complications, treatment options, non-operative alternatives, expected recovery and outcomes were discussed with the patient. The possibilities of reaction to medication, pulmonary aspiration, injury to surrounding structures, bleeding, recurrent infection, the need for additional procedures, failure to diagnose a condition, and creating a complication requiring transfusion or operation were discussed with the patient. The patient concurred with the proposed plan, giving informed consent.  The site of surgery was properly noted/marked if necessary per policy. The patient has been actively warmed in preoperative area. Preoperative antibiotics have been ordered and given within 1 hours of incision. Venous thrombosis prophylaxis 1have been ordered including bilateral sequential compression devices    Procedure Details: Under general anesthesia, with the patient in supine position, genitalia and lower abdomen was scrubbed and draped in the usual pattern.  Initially we introduced the flexible cystoscope and scope of the bladder showing the site of the repaired cystotomy without any evidence of a defect.  We filled the bladder and still could not see any defect.  Then we emptied the " bladder and filled it with contrast, and cystogram with fluoroscopy was performed in multiple angles showing the whole contrast to be contained within the bladder and without any extravasation.  We then made an elliptical incision in the skin around the lower edge of the prior incision where the drainage was occurring, then deepened this incision with the electrocautery.  The subcutaneous tissue appeared to be necrotic, which extended down to the level of the fascia.  On finger palpation, there were no pockets of pus no defect in the anterior wall of the bladder.  We further filled the bladder to observe for any leakage, however there was none.  We debrided the necrotic tissue which appeared to be involving the fascial edge, and the fascial suture was free-floating and was removed.  Hemostasis was performed.  At this point, because of the quality of the fascia as well as the evidence of necrotic tissue, and the fascial defect being anterior to the bladder and not overlying any peritoneum, we decided not to close the fascia and allow for healing with secondary intention.  The wound was packed with Xeroform then with wet-to-dry dressing.  Local anesthetic was injected and the edges of the wound.  A 16 Persian Welsh catheter was inserted in the bladder and bladder was drained completely.  The urine was clear with debris throughout without any evidence of gross blood.  The patient was awakened and taken to PACU in stable condition.      Evidence of Infection: Yes; Phlegmon Within the subcutaneous tissues  Complications:  None; patient tolerated the procedure well.    Disposition: PACU - hemodynamically stable.  Condition: stable         Additional Details: Patient to be referred for wound care and possibly subsequently to general surgery for consideration of fascial repair if need be    Attending Attestation: I performed the procedure.    Samaria Garcia  Phone Number: [phone]

## 2025-08-01 NOTE — DISCHARGE INSTRUCTIONS
Please continue the levofloxacin until finished.    Wound care:  - please change twice a day, this can be once in the morning and once at night   - pack with a small piece of xeroform, lightly pack after the first day, do not shove   - wet one piece of gauze, fold into a smaller square and place this next over the wound   - now place a dry piece gauze folded into a smaller square over the wet  - cover everything with the Tegaderm, this is clear and very thin, it will not stay of the surrounding skin is wet so make sure to dry the area around the gauze first  - you may take off the dressing before it is needed to be changed and gently wash with soap and water in the shower    [facility] has scheduled you for Monday at 9 am with Dr. Butler.    Please call Dr. Garcia's office with any further questions [phone].

## 2025-08-01 NOTE — ANESTHESIA POSTPROCEDURE EVALUATION
"Patient: Gary Pineda \"Juan\"    Procedure Summary       Date: 08/01/25 Room / Location: [facility] / [facility]    Anesthesia Start: 0806 Anesthesia Stop: 0912    Procedures:       CYSTOSCOPY, FLEXIBLE      DEBRIDEMENT, WOUND (Abdomen) Diagnosis:       Benign prostatic hyperplasia with urinary frequency      Wound dehiscence      (Benign prostatic hyperplasia with urinary frequency [N40.1, R35.0])      (Wound dehiscence [T81.30XA])    Surgeons: Samaria Garcia MD Responsible Provider: MINE Condon    Anesthesia Type: general ASA Status: 3            Anesthesia Type: general    Vitals Value Taken Time   /74 08/01/25 09:12   Temp 36.5 08/01/25 09:12   Pulse 74 08/01/25 09:12   Resp 15 08/01/25 09:12   SpO2 98 08/01/25 09:12       Anesthesia Post Evaluation    Patient location during evaluation: PACU  Patient participation: complete - patient participated  Level of consciousness: awake and alert  Pain score: 0  Pain management: adequate  Airway patency: patent  Cardiovascular status: acceptable  Respiratory status: acceptable and room air  Hydration status: acceptable  Postoperative Nausea and Vomiting: none        There were no known notable events for this encounter.    "

## 2025-08-01 NOTE — ANESTHESIA PROCEDURE NOTES
Airway  Date/Time: 8/1/2025 8:15 AM  Reason: elective    Airway not difficult    Staffing  Performed: CRNA   Authorized by: MINE Condon    Performed by: MIEN Condon  Patient location during procedure: OR    Patient Condition  Indications for airway management: anesthesia and airway protection  Patient position: sniffing  MILS maintained throughout  Planned trial extubation  Sedation level: deep     Final Airway Details   Preoxygenated: yes  Final airway type: supraglottic airway  Successful airway: Supraglottic airway: i-gel.  Size: 4   Ventilation between attempts: none  Number of attempts at approach: 1  Number of other approaches attempted: 0

## 2025-08-01 NOTE — INTERVAL H&P NOTE
H&P reviewed. The patient was examined and there are no changes to the H&P.  No significant findings; reviewed medications and allergies; patient seen and discussed with attending physician responsible for performing the procedure.  He has UTI and picked up his antibiotic. Took 2 doses.

## 2025-08-04 ENCOUNTER — OFFICE VISIT (OUTPATIENT)
Dept: WOUND CARE | Facility: HOSPITAL | Age: 80
End: 2025-08-04
Payer: MEDICARE

## 2025-08-04 PROCEDURE — 99213 OFFICE O/P EST LOW 20 MIN: CPT | Mod: 25

## 2025-08-04 PROCEDURE — 11042 DBRDMT SUBQ TIS 1ST 20SQCM/<: CPT

## 2025-08-05 ENCOUNTER — APPOINTMENT (OUTPATIENT)
Dept: PRIMARY CARE | Facility: CLINIC | Age: 80
End: 2025-08-05
Payer: MEDICARE

## 2025-08-05 VITALS
HEART RATE: 92 BPM | SYSTOLIC BLOOD PRESSURE: 134 MMHG | DIASTOLIC BLOOD PRESSURE: 80 MMHG | WEIGHT: 191 LBS | BODY MASS INDEX: 26.74 KG/M2 | OXYGEN SATURATION: 97 % | HEIGHT: 71 IN

## 2025-08-05 DIAGNOSIS — E78.2 MIXED HYPERLIPIDEMIA: ICD-10-CM

## 2025-08-05 DIAGNOSIS — D64.9 ANEMIA, UNSPECIFIED TYPE: ICD-10-CM

## 2025-08-05 DIAGNOSIS — I10 PRIMARY HYPERTENSION: ICD-10-CM

## 2025-08-05 DIAGNOSIS — E11.9 CONTROLLED TYPE 2 DIABETES MELLITUS WITHOUT COMPLICATION, WITHOUT LONG-TERM CURRENT USE OF INSULIN: Primary | ICD-10-CM

## 2025-08-05 PROBLEM — R31.0 HEMATURIA, GROSS: Status: RESOLVED | Noted: 2025-05-05 | Resolved: 2025-08-05

## 2025-08-05 PROBLEM — K57.12 DIVERTICULITIS OF JEJUNUM: Status: RESOLVED | Noted: 2025-05-05 | Resolved: 2025-08-05

## 2025-08-05 PROBLEM — R42 DIZZINESS: Status: RESOLVED | Noted: 2025-05-05 | Resolved: 2025-08-05

## 2025-08-05 PROBLEM — L23.9 ALLERGIC CONTACT DERMATITIS, UNSPECIFIED CAUSE: Status: RESOLVED | Noted: 2022-09-28 | Resolved: 2025-08-05

## 2025-08-05 PROBLEM — M17.12 ARTHRITIS OF LEFT KNEE: Status: RESOLVED | Noted: 2025-04-15 | Resolved: 2025-08-05

## 2025-08-05 PROBLEM — L30.9 ECZEMA: Status: RESOLVED | Noted: 2023-10-06 | Resolved: 2025-08-05

## 2025-08-05 PROBLEM — E11.65 TYPE 2 DIABETES MELLITUS WITH HYPERGLYCEMIA, WITHOUT LONG-TERM CURRENT USE OF INSULIN: Status: RESOLVED | Noted: 2025-05-05 | Resolved: 2025-08-05

## 2025-08-05 LAB — POC HEMOGLOBIN A1C: 7 % (ref 4.2–6.5)

## 2025-08-05 PROCEDURE — 3075F SYST BP GE 130 - 139MM HG: CPT | Performed by: FAMILY MEDICINE

## 2025-08-05 PROCEDURE — 3079F DIAST BP 80-89 MM HG: CPT | Performed by: FAMILY MEDICINE

## 2025-08-05 PROCEDURE — 99214 OFFICE O/P EST MOD 30 MIN: CPT | Performed by: FAMILY MEDICINE

## 2025-08-05 PROCEDURE — 83036 HEMOGLOBIN GLYCOSYLATED A1C: CPT | Performed by: FAMILY MEDICINE

## 2025-08-05 PROCEDURE — G2211 COMPLEX E/M VISIT ADD ON: HCPCS | Performed by: FAMILY MEDICINE

## 2025-08-05 PROCEDURE — 1160F RVW MEDS BY RX/DR IN RCRD: CPT | Performed by: FAMILY MEDICINE

## 2025-08-05 PROCEDURE — 1159F MED LIST DOCD IN RCRD: CPT | Performed by: FAMILY MEDICINE

## 2025-08-05 RX ORDER — TETRACYCLINE HYDROCHLORIDE 500 MG/1
500 CAPSULE ORAL 3 TIMES DAILY
Qty: 21 CAPSULE | Refills: 0 | Status: SHIPPED | OUTPATIENT
Start: 2025-08-05 | End: 2025-08-12

## 2025-08-05 ASSESSMENT — ENCOUNTER SYMPTOMS
OCCASIONAL FEELINGS OF UNSTEADINESS: 0
LOSS OF SENSATION IN FEET: 0
DEPRESSION: 0

## 2025-08-05 NOTE — PROGRESS NOTES
"Subjective   Patient ID: Gary Pineda \"Donte" is a 80 y.o. male who presents for Annual Exam (Check up ).  HPI  History of Present Illness  The patient presents for a follow-up visit.    He reports no chest pain, shortness of breath, palpitations, or irregular heartbeats. He also does not experience dizziness, headaches, or changes in vision. His last eye examination was conducted in 2024. He spends a significant amount of time outdoors and uses sunblock regularly. He declines the second pneumonia vaccine. He recently had his Jardiance prescription refilled and does not require any additional refills at this time. He reports no leg swelling or blood in his urine. He underwent surgery on 08/01/2025. He reports persistent weakness, attributing it to incomplete recovery from a recent blood loss.    Ophtho- 5/24  Dentist- due to see  [place]- 8/24  Colonoscopy-  NEHEMIAS-  FOBT-  PSA- 8/24  UA/Micro- 12/23  Lung CT-   Coronary Calcium CT Score-  AAA-  EKG-  Pneumovax- 10/20  Prevnar- refused  Flu- 12/24  Shingrix-  Td-6/23  Hep C-  Advance Directives-  AWV- 2/4/25  ETOH screen- does not drink      Current Medications[1]   Surgical History[2]   Medical History[3]  Social History[4]   Family History[5]   Review of Systems    Objective   /80   Pulse 92   Ht 1.803 m (5' 11\")   Wt 86.6 kg (191 lb)   SpO2 97%   BMI 26.64 kg/m²    Physical Exam  Vitals and nursing note reviewed.   Constitutional:       General: He is not in acute distress.     Appearance: Normal appearance.   HENT:      Head: Normocephalic and atraumatic.      Right Ear: Tympanic membrane, ear canal and external ear normal.      Left Ear: Tympanic membrane, ear canal and external ear normal.      Nose: Nose normal.      Mouth/Throat:      Mouth: Mucous membranes are moist.      Pharynx: Oropharynx is clear.     Eyes:      Extraocular Movements: Extraocular movements intact.      Conjunctiva/sclera: Conjunctivae normal.      Pupils: Pupils are equal, round, " and reactive to light.     Neck:      Vascular: No carotid bruit.     Cardiovascular:      Rate and Rhythm: Normal rate and regular rhythm.      Pulses: Normal pulses.      Heart sounds: Normal heart sounds. No murmur heard.  Pulmonary:      Effort: Pulmonary effort is normal.      Breath sounds: Normal breath sounds. No wheezing, rhonchi or rales.   Abdominal:      General: Abdomen is flat. Bowel sounds are normal.      Palpations: Abdomen is soft. There is no mass.     Musculoskeletal:         General: Normal range of motion.      Cervical back: Normal range of motion and neck supple.   Lymphadenopathy:      Cervical: No cervical adenopathy.     Skin:     Capillary Refill: Capillary refill takes less than 2 seconds.     Neurological:      General: No focal deficit present.      Mental Status: He is alert and oriented to person, place, and time.      Cranial Nerves: No cranial nerve deficit.      Motor: No weakness.      Deep Tendon Reflexes: Reflexes normal.     Psychiatric:         Mood and Affect: Mood normal.         Behavior: Behavior normal.           Assessment/Plan   Problem List Items Addressed This Visit       Primary hypertension    Relevant Orders    Comprehensive Metabolic Panel    Mixed hyperlipidemia    Relevant Orders    Lipid Panel    Comprehensive Metabolic Panel    Controlled type 2 diabetes mellitus without complication, without long-term current use of insulin - Primary    Relevant Orders    Albumin-Creatinine Ratio, Urine Random    POCT glycosylated hemoglobin (Hb A1C) manually resulted (Completed)    Comprehensive Metabolic Panel     Other Visit Diagnoses         Anemia, unspecified type        Relevant Orders    CBC        Renewed/continued rest of medications  Checked  labs  Updated Health Maintenance in HPI section  HTN, controlled- continue meds, low sodium diet     Overweight- caloric restriction, increase CV exercise     Hyperlipidemia- continue meds, low fat/cholesterol diet     BPH-  limit caffeine, avoid fluid before bed     History of Melanoma- follow with dermatology/oncology     DM, type 2- avoid sugars, low carbs, increase CV exercise, continue meds, check feet daily    Assessment & Plan  1. Health maintenance.  - Hemoglobin A1c level is currently at 7, which is within an acceptable range.  - Reports no chest pain, shortness of breath, heart racing, skipping beats, dizziness, headaches, vision changes, or leg swelling.  - Advised to schedule an appointment with ophthalmologist for a routine eye examination, as the last visit was in 2024.  - Declined the second pneumonia vaccine.    Follow-up: A follow-up visit is scheduled in 6 months.       Patient understands and agrees with treatment plan    This medical note was created with the assistance of artificial intelligence (AI) for documentation purposes. The content has been reviewed and confirmed by the healthcare provider for accuracy and completeness. Patient consented to the use of audio recording and use of AI during their visit.     Kale Cazares DO          [1]   Current Outpatient Medications:     atorvastatin (Lipitor) 80 mg tablet, TAKE 1 TABLET (80 MG) BY MOUTH ONCE DAILY IN THE EVENING., Disp: 90 tablet, Rfl: 1    blood sugar diagnostic (OneTouch Verio test strips) strip, FOR TESTING ONE A DAY DX E 11.9, Disp: 100 strip, Rfl: 3    cholecalciferol (Vitamin D-3) 25 MCG (1000 UT) capsule, Take 1 capsule (25 mcg) by mouth once daily., Disp: , Rfl:     ferrous sulfate 325 (65 Fe) mg EC tablet, Take 1 tablet by mouth once daily with breakfast. Do not crush, chew, or split., Disp: 30 tablet, Rfl: 11    Januvia 100 mg tablet, TAKE 1 TABLET BY MOUTH EVERY DAY, Disp: 90 tablet, Rfl: 1    Jardiance 25 mg tablet, TAKE 1 TABLET BY MOUTH EVERY DAY, Disp: 90 tablet, Rfl: 3    losartan (Cozaar) 25 mg tablet, TAKE 1 TABLET BY MOUTH EVERY DAY, Disp: 90 tablet, Rfl: 1    tetracycline 500 mg capsule, Take 1 capsule (500 mg) by mouth 3 times a day  for 7 days., Disp: 21 capsule, Rfl: 0  [2]   Past Surgical History:  Procedure Laterality Date    CATARACT EXTRACTION Bilateral 02/18/2015    Cataract Surgery    COLONOSCOPY  09/09/2013    Complete Colonoscopy    CYSTOSCOPY      HERNIA REPAIR  09/09/2013    Inguinal Hernia Repair + MESH    JOINT REPLACEMENT      KNEE ARTHROSCOPY W/ DEBRIDEMENT Right 09/09/2013    Knee Arthroscopy (Therapeutic)    PROSTATE SURGERY      ROTATOR CUFF REPAIR Left 09/09/2013    Rotator Cuff Repair    TOTAL KNEE ARTHROPLASTY Right    [3]   Past Medical History:  Diagnosis Date    Acute maxillary sinusitis, unspecified 06/27/2017    Acute non-recurrent maxillary sinusitis    Anemia     Body mass index (BMI) 28.0-28.9, adult 03/12/2019    BMI 28.0-28.9,adult    Body mass index (BMI) 28.0-28.9, adult 10/09/2020    Body mass index (BMI) of 28.0 to 28.9 in adult    Body mass index (BMI) 29.0-29.9, adult 03/21/2020    Body mass index (BMI) of 29.0 to 29.9 in adult    Diabetes mellitus (Multi)     Neoplasm of unspecified behavior of bone, soft tissue, and skin 06/28/2017    Neoplasm of skin of ear    Nontoxic goiter, 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    Other conditions influencing health status 06/08/2015    History of cough    Other conditions influencing health status 03/12/2019    Diabetes mellitus type II, uncontrolled    Pain, unspecified 08/09/2016    Acute pain    Personal history of diseases of the skin and subcutaneous tissue 12/04/2014    History of seborrheic keratosis    Personal history of other diseases of male genital organs     History of benign prostatic hypertrophy    Personal history of other diseases of the musculoskeletal system and connective tissue     History of arthritis    Prediabetes 12/09/2015    Pre-diabetes    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

## 2025-08-11 ENCOUNTER — OFFICE VISIT (OUTPATIENT)
Dept: WOUND CARE | Facility: HOSPITAL | Age: 80
End: 2025-08-11
Payer: MEDICARE

## 2025-08-11 PROCEDURE — 11042 DBRDMT SUBQ TIS 1ST 20SQCM/<: CPT | Performed by: SURGERY

## 2025-08-11 PROCEDURE — 11042 DBRDMT SUBQ TIS 1ST 20SQCM/<: CPT

## 2025-08-18 ENCOUNTER — OFFICE VISIT (OUTPATIENT)
Dept: WOUND CARE | Facility: HOSPITAL | Age: 80
End: 2025-08-18
Payer: MEDICARE

## 2025-08-18 PROCEDURE — 11042 DBRDMT SUBQ TIS 1ST 20SQCM/<: CPT | Performed by: SURGERY

## 2025-08-18 PROCEDURE — 11042 DBRDMT SUBQ TIS 1ST 20SQCM/<: CPT

## 2025-08-25 ENCOUNTER — OFFICE VISIT (OUTPATIENT)
Dept: WOUND CARE | Facility: HOSPITAL | Age: 80
End: 2025-08-25
Payer: MEDICARE

## 2025-08-25 PROCEDURE — 11042 DBRDMT SUBQ TIS 1ST 20SQCM/<: CPT

## 2025-08-25 PROCEDURE — 11042 DBRDMT SUBQ TIS 1ST 20SQCM/<: CPT | Performed by: SURGERY

## 2025-08-27 ENCOUNTER — APPOINTMENT (OUTPATIENT)
Dept: DERMATOLOGY | Facility: CLINIC | Age: 80
End: 2025-08-27
Payer: MEDICARE

## 2025-08-27 DIAGNOSIS — Z85.820 PERSONAL HISTORY OF MALIGNANT MELANOMA OF SKIN: ICD-10-CM

## 2025-08-27 DIAGNOSIS — L82.1 SEBORRHEIC KERATOSIS: ICD-10-CM

## 2025-08-27 DIAGNOSIS — L57.8 ACTINIC SKIN DAMAGE: ICD-10-CM

## 2025-08-27 DIAGNOSIS — D22.9 MULTIPLE BENIGN NEVI: Primary | ICD-10-CM

## 2025-08-27 DIAGNOSIS — L57.0 ACTINIC KERATOSIS: ICD-10-CM

## 2025-08-27 DIAGNOSIS — Z12.83 SCREENING EXAM FOR SKIN CANCER: ICD-10-CM

## 2025-08-27 PROCEDURE — 1160F RVW MEDS BY RX/DR IN RCRD: CPT | Performed by: DERMATOLOGY

## 2025-08-27 PROCEDURE — 17000 DESTRUCT PREMALG LESION: CPT

## 2025-08-27 PROCEDURE — 1159F MED LIST DOCD IN RCRD: CPT | Performed by: DERMATOLOGY

## 2025-08-27 PROCEDURE — 99213 OFFICE O/P EST LOW 20 MIN: CPT | Performed by: DERMATOLOGY

## 2025-08-27 PROCEDURE — 17003 DESTRUCT PREMALG LES 2-14: CPT

## 2025-08-27 ASSESSMENT — DERMATOLOGY PATIENT ASSESSMENT
DO YOU USE A TANNING BED: NO
DO YOU HAVE ANY NEW OR CHANGING LESIONS: NO
HAVE YOU HAD OR DO YOU HAVE A STAPH INFECTION: NO
DO YOU USE SUNSCREEN: OCCASIONALLY
HAVE YOU HAD OR DO YOU HAVE VASCULAR DISEASE: NO
ARE YOU AN ORGAN TRANSPLANT RECIPIENT: NO

## 2025-08-27 ASSESSMENT — DERMATOLOGY QUALITY OF LIFE (QOL) ASSESSMENT
DATE THE QUALITY-OF-LIFE ASSESSMENT WAS COMPLETED: 67444
RATE HOW EMOTIONALLY BOTHERED YOU ARE BY YOUR SKIN PROBLEM (FOR EXAMPLE, WORRY, EMBARRASSMENT, FRUSTRATION): 0 - NEVER BOTHERED
RATE HOW BOTHERED YOU ARE BY EFFECTS OF YOUR SKIN PROBLEMS ON YOUR ACTIVITIES (EG, GOING OUT, ACCOMPLISHING WHAT YOU WANT, WORK ACTIVITIES OR YOUR RELATIONSHIPS WITH OTHERS): 0 - NEVER BOTHERED
ARE THERE EXCLUSIONS OR EXCEPTIONS FOR THE QUALITY OF LIFE ASSESSMENT: NO
RATE HOW BOTHERED YOU ARE BY SYMPTOMS OF YOUR SKIN PROBLEM (EG, ITCHING, STINGING BURNING, HURTING OR SKIN IRRITATION): 0 - NEVER BOTHERED

## 2025-08-27 ASSESSMENT — ITCH NUMERIC RATING SCALE: HOW SEVERE IS YOUR ITCHING?: 0

## 2025-08-27 ASSESSMENT — PATIENT GLOBAL ASSESSMENT (PGA): PATIENT GLOBAL ASSESSMENT: PATIENT GLOBAL ASSESSMENT:  1 - CLEAR

## 2026-02-06 ENCOUNTER — APPOINTMENT (OUTPATIENT)
Dept: PRIMARY CARE | Facility: CLINIC | Age: 81
End: 2026-02-06
Payer: MEDICARE

## 2026-03-04 ENCOUNTER — APPOINTMENT (OUTPATIENT)
Dept: DERMATOLOGY | Facility: CLINIC | Age: 81
End: 2026-03-04
Payer: MEDICARE

## (undated) DEVICE — SYRINGE, 60 CC, IRRIGATION, PISTON, CATH TIP, W/LUER ADAPTER,DISP

## (undated) DEVICE — CATHETER, URETHRAL, FOLEY, 2 WAY, BARDEX IC, 16 FR, 5 CC, SILVER, LATEX

## (undated) DEVICE — TUBING SET, TRI-LUMEN, FILTERED, F/AIRSEAL

## (undated) DEVICE — Device

## (undated) DEVICE — COVER, TABLE, UHC

## (undated) DEVICE — SUTURE, VICRYL, 0, 18 IN, CT-2 CR, VIOLET

## (undated) DEVICE — SUTURE, VICRYL, 2-0, 27 IN, SH, UNDYED

## (undated) DEVICE — DRAPE, TIBURON W/ADHESIVE, 19 X 30

## (undated) DEVICE — STATLOCK, FOLEY SWIVEL, SILICONE TRICOT

## (undated) DEVICE — SUTURE, VICRYL, 3-0, 27 IN, SH, VIOLET

## (undated) DEVICE — DRAIN, WOUND, ROUND, W/TROCAR, HOLE PATTERN, 10 IN, MEDIUM/LARGE, 3/16 X 49 IN

## (undated) DEVICE — PUMP, STRYKERFLOW 2 & HANDPIECE W/10FT. IRRIGATION TUBING

## (undated) DEVICE — EVACUATOR, WOUND, SUCTION, CLOSED, JACKSON-PRATT, 100 CC, SILICONE

## (undated) DEVICE — MANIFOLD, 4 PORT NEPTUNE STANDARD

## (undated) DEVICE — ACCESS PORT, 12MM, 100MM LENGTH, LOW PROFILE W/BLADELESS OPTICAL TIP

## (undated) DEVICE — CLIP, LIGATING, HEM-O-LOCK, MLX, LARGE, LF, PURPLE

## (undated) DEVICE — CATHETER, URETERAL, OPEN END, 5 FR, 70 CM

## (undated) DEVICE — CLIPPER, SURGICAL BLADE ASSEMBLY, GENERAL PURPOSE, SINGLE USE

## (undated) DEVICE — BANDAGE, GAUZE, CONFORMING, KERLIX, 6 PLY, 4.5 IN X 4.1 YD

## (undated) DEVICE — GOWN, SURGICAL, SMARTGOWN, XLARGE, STERILE

## (undated) DEVICE — SUTURE, STRATAFIX, 1 SYMMETRIC, PDS PLUS, 60CM, CTX, VIOLET

## (undated) DEVICE — OBTURATOR, BLADELESS , SU

## (undated) DEVICE — POSITIONING, THE PINK PAD, PIGAZZI SYSTEM

## (undated) DEVICE — SPONGE, LAP, XRAY DECT, SC+RFID, 4X18, STERILE

## (undated) DEVICE — SUTURE, V-LOC, 3-0, 9IN, CV-23, GREEN

## (undated) DEVICE — KIT, ROBOTIC, CUSTOM UHC

## (undated) DEVICE — PREP TRAY, SKIN, DRY, W/GLOVES

## (undated) DEVICE — GLOVE, SURGICAL, PROTEXIS PI , 8.0, PF, LF

## (undated) DEVICE — ADHESIVE, SKIN, DERMABOND ADVANCED, 15CM, PEN-STYLE

## (undated) DEVICE — SUTURE, MONOCRYL, 4-0, 18 IN, PS2, UNDYED

## (undated) DEVICE — DRESSING, ADHESIVE, ISLAND, TELFA, 2 X 3.75 IN, LF

## (undated) DEVICE — DRAPE, INSTRUMENT ARM, SP